# Patient Record
Sex: FEMALE | Race: WHITE | NOT HISPANIC OR LATINO | Employment: PART TIME | URBAN - METROPOLITAN AREA
[De-identification: names, ages, dates, MRNs, and addresses within clinical notes are randomized per-mention and may not be internally consistent; named-entity substitution may affect disease eponyms.]

---

## 2017-01-02 ENCOUNTER — HOSPITAL ENCOUNTER (EMERGENCY)
Facility: HOSPITAL | Age: 32
Discharge: HOME | End: 2017-01-02
Attending: EMERGENCY MEDICINE | Admitting: EMERGENCY MEDICINE
Payer: COMMERCIAL

## 2017-01-02 VITALS
TEMPERATURE: 99.4 F | BODY MASS INDEX: 23.55 KG/M2 | HEIGHT: 62 IN | HEART RATE: 84 BPM | OXYGEN SATURATION: 97 % | RESPIRATION RATE: 20 BRPM | WEIGHT: 128 LBS | SYSTOLIC BLOOD PRESSURE: 133 MMHG | DIASTOLIC BLOOD PRESSURE: 78 MMHG

## 2017-01-02 DIAGNOSIS — J34.89 FRONTAL SINUS PAIN: ICD-10-CM

## 2017-01-02 DIAGNOSIS — J06.9 UPPER RESPIRATORY INFECTION: Primary | ICD-10-CM

## 2017-01-02 LAB
FLUAV AG SPEC QL IA: NEGATIVE
FLUBV AG SPEC QL IA: NEGATIVE

## 2017-01-02 PROCEDURE — 99283 EMERGENCY DEPT VISIT LOW MDM: CPT

## 2017-01-02 PROCEDURE — 87798 DETECT AGENT NOS DNA AMP: CPT | Performed by: EMERGENCY MEDICINE

## 2017-01-02 PROCEDURE — 87400 INFLUENZA A/B EACH AG IA: CPT

## 2017-01-02 RX ORDER — NAPROXEN 500 MG/1
500 TABLET ORAL 2 TIMES DAILY WITH MEALS
Qty: 10 TABLET | Refills: 0 | Status: SHIPPED | OUTPATIENT
Start: 2017-01-02 | End: 2018-06-05 | Stop reason: ALTCHOICE

## 2017-01-02 RX ORDER — PSEUDOEPHEDRINE HYDROCHLORIDE 30 MG/1
30 TABLET ORAL EVERY 4 HOURS PRN
COMMUNITY
End: 2018-06-05 | Stop reason: ALTCHOICE

## 2017-01-02 RX ORDER — AMOXICILLIN AND CLAVULANATE POTASSIUM 875; 125 MG/1; MG/1
1 TABLET, FILM COATED ORAL 2 TIMES DAILY
Qty: 10 TABLET | Refills: 0 | Status: SHIPPED | OUTPATIENT
Start: 2017-01-02 | End: 2017-01-07

## 2017-01-03 LAB
FLUAV AG SPEC QL: NORMAL
FLUBV AG SPEC QL: NORMAL
RSV B RNA SPEC QL NAA+PROBE: NORMAL

## 2017-02-08 ENCOUNTER — GENERIC CONVERSION - ENCOUNTER (OUTPATIENT)
Dept: OTHER | Facility: OTHER | Age: 32
End: 2017-02-08

## 2017-07-03 ENCOUNTER — HOSPITAL ENCOUNTER (OUTPATIENT)
Facility: HOSPITAL | Age: 32
Setting detail: OUTPATIENT SURGERY
Discharge: HOME | End: 2017-07-03
Attending: OBSTETRICS & GYNECOLOGY | Admitting: OBSTETRICS & GYNECOLOGY
Payer: COMMERCIAL

## 2017-07-03 ENCOUNTER — ANESTHESIA (OUTPATIENT)
Dept: PERIOP | Facility: HOSPITAL | Age: 32
End: 2017-07-03
Payer: COMMERCIAL

## 2017-07-03 ENCOUNTER — ANESTHESIA EVENT (OUTPATIENT)
Dept: PERIOP | Facility: HOSPITAL | Age: 32
End: 2017-07-03
Payer: COMMERCIAL

## 2017-07-03 VITALS
WEIGHT: 120 LBS | HEART RATE: 54 BPM | OXYGEN SATURATION: 99 % | TEMPERATURE: 98.7 F | SYSTOLIC BLOOD PRESSURE: 106 MMHG | DIASTOLIC BLOOD PRESSURE: 60 MMHG | RESPIRATION RATE: 18 BRPM | HEIGHT: 62 IN | BODY MASS INDEX: 22.08 KG/M2

## 2017-07-03 DIAGNOSIS — Z30.2 STERILIZATION: ICD-10-CM

## 2017-07-03 LAB — EXT PREGNANCY TEST URINE: NEGATIVE

## 2017-07-03 PROCEDURE — 81025 URINE PREGNANCY TEST: CPT | Performed by: OBSTETRICS & GYNECOLOGY

## 2017-07-03 PROCEDURE — 88302 TISSUE EXAM BY PATHOLOGIST: CPT | Performed by: OBSTETRICS & GYNECOLOGY

## 2017-07-03 PROCEDURE — C1758 CATHETER, URETERAL: HCPCS | Performed by: OBSTETRICS & GYNECOLOGY

## 2017-07-03 RX ORDER — PHENAZOPYRIDINE HYDROCHLORIDE 100 MG/1
100 TABLET, FILM COATED ORAL
Status: DISCONTINUED | OUTPATIENT
Start: 2017-07-03 | End: 2017-07-03 | Stop reason: HOSPADM

## 2017-07-03 RX ORDER — MAGNESIUM HYDROXIDE 1200 MG/15ML
LIQUID ORAL AS NEEDED
Status: DISCONTINUED | OUTPATIENT
Start: 2017-07-03 | End: 2017-07-03 | Stop reason: HOSPADM

## 2017-07-03 RX ORDER — MIDAZOLAM HYDROCHLORIDE 1 MG/ML
INJECTION INTRAMUSCULAR; INTRAVENOUS AS NEEDED
Status: DISCONTINUED | OUTPATIENT
Start: 2017-07-03 | End: 2017-07-03 | Stop reason: SURG

## 2017-07-03 RX ORDER — FENTANYL CITRATE 50 UG/ML
INJECTION, SOLUTION INTRAMUSCULAR; INTRAVENOUS AS NEEDED
Status: DISCONTINUED | OUTPATIENT
Start: 2017-07-03 | End: 2017-07-03 | Stop reason: SURG

## 2017-07-03 RX ORDER — SODIUM CHLORIDE, SODIUM LACTATE, POTASSIUM CHLORIDE, CALCIUM CHLORIDE 600; 310; 30; 20 MG/100ML; MG/100ML; MG/100ML; MG/100ML
INJECTION, SOLUTION INTRAVENOUS CONTINUOUS PRN
Status: DISCONTINUED | OUTPATIENT
Start: 2017-07-03 | End: 2017-07-03 | Stop reason: SURG

## 2017-07-03 RX ORDER — FENTANYL CITRATE/PF 50 MCG/ML
25 SYRINGE (ML) INJECTION
Status: DISCONTINUED | OUTPATIENT
Start: 2017-07-03 | End: 2017-07-03 | Stop reason: HOSPADM

## 2017-07-03 RX ORDER — ONDANSETRON 2 MG/ML
INJECTION INTRAMUSCULAR; INTRAVENOUS AS NEEDED
Status: DISCONTINUED | OUTPATIENT
Start: 2017-07-03 | End: 2017-07-03 | Stop reason: SURG

## 2017-07-03 RX ORDER — BUPIVACAINE HYDROCHLORIDE AND EPINEPHRINE 2.5; 5 MG/ML; UG/ML
INJECTION, SOLUTION EPIDURAL; INFILTRATION; INTRACAUDAL; PERINEURAL AS NEEDED
Status: DISCONTINUED | OUTPATIENT
Start: 2017-07-03 | End: 2017-07-03 | Stop reason: HOSPADM

## 2017-07-03 RX ORDER — FUROSEMIDE 10 MG/ML
INJECTION INTRAMUSCULAR; INTRAVENOUS AS NEEDED
Status: DISCONTINUED | OUTPATIENT
Start: 2017-07-03 | End: 2017-07-03 | Stop reason: SURG

## 2017-07-03 RX ORDER — GLYCOPYRROLATE 0.2 MG/ML
INJECTION INTRAMUSCULAR; INTRAVENOUS AS NEEDED
Status: DISCONTINUED | OUTPATIENT
Start: 2017-07-03 | End: 2017-07-03 | Stop reason: SURG

## 2017-07-03 RX ORDER — DEXAMETHASONE SODIUM PHOSPHATE 4 MG/ML
INJECTION, SOLUTION INTRA-ARTICULAR; INTRALESIONAL; INTRAMUSCULAR; INTRAVENOUS; SOFT TISSUE AS NEEDED
Status: DISCONTINUED | OUTPATIENT
Start: 2017-07-03 | End: 2017-07-03 | Stop reason: SURG

## 2017-07-03 RX ORDER — ROCURONIUM BROMIDE 10 MG/ML
INJECTION, SOLUTION INTRAVENOUS AS NEEDED
Status: DISCONTINUED | OUTPATIENT
Start: 2017-07-03 | End: 2017-07-03 | Stop reason: SURG

## 2017-07-03 RX ORDER — METOCLOPRAMIDE HYDROCHLORIDE 5 MG/ML
10 INJECTION INTRAMUSCULAR; INTRAVENOUS ONCE AS NEEDED
Status: COMPLETED | OUTPATIENT
Start: 2017-07-03 | End: 2017-07-03

## 2017-07-03 RX ORDER — SODIUM CHLORIDE, SODIUM LACTATE, POTASSIUM CHLORIDE, CALCIUM CHLORIDE 600; 310; 30; 20 MG/100ML; MG/100ML; MG/100ML; MG/100ML
75 INJECTION, SOLUTION INTRAVENOUS CONTINUOUS
Status: DISCONTINUED | OUTPATIENT
Start: 2017-07-03 | End: 2017-07-03 | Stop reason: HOSPADM

## 2017-07-03 RX ORDER — PROPOFOL 10 MG/ML
INJECTION, EMULSION INTRAVENOUS AS NEEDED
Status: DISCONTINUED | OUTPATIENT
Start: 2017-07-03 | End: 2017-07-03 | Stop reason: SURG

## 2017-07-03 RX ADMIN — METHYLENE BLUE 50 MG: 5 INJECTION INTRAVENOUS at 10:11

## 2017-07-03 RX ADMIN — ROCURONIUM BROMIDE 40 MG: 10 INJECTION, SOLUTION INTRAVENOUS at 09:30

## 2017-07-03 RX ADMIN — DEXAMETHASONE SODIUM PHOSPHATE 8 MG: 4 INJECTION, SOLUTION INTRA-ARTICULAR; INTRALESIONAL; INTRAMUSCULAR; INTRAVENOUS; SOFT TISSUE at 09:45

## 2017-07-03 RX ADMIN — GLYCOPYRROLATE 0.6 MG: 0.2 INJECTION, SOLUTION INTRAMUSCULAR; INTRAVENOUS at 10:04

## 2017-07-03 RX ADMIN — PROPOFOL 200 MG: 10 INJECTION, EMULSION INTRAVENOUS at 09:30

## 2017-07-03 RX ADMIN — FENTANYL CITRATE 50 MCG: 50 INJECTION, SOLUTION INTRAMUSCULAR; INTRAVENOUS at 09:30

## 2017-07-03 RX ADMIN — NEOSTIGMINE METHYLSULFATE 4 MG: 1 INJECTION, SOLUTION INTRAMUSCULAR; INTRAVENOUS; SUBCUTANEOUS at 10:04

## 2017-07-03 RX ADMIN — ONDANSETRON 4 MG: 2 INJECTION INTRAMUSCULAR; INTRAVENOUS at 10:04

## 2017-07-03 RX ADMIN — LIDOCAINE HYDROCHLORIDE 60 MG: 20 INJECTION, SOLUTION INTRAVENOUS at 09:30

## 2017-07-03 RX ADMIN — FENTANYL CITRATE 50 MCG: 50 INJECTION, SOLUTION INTRAMUSCULAR; INTRAVENOUS at 09:40

## 2017-07-03 RX ADMIN — METOCLOPRAMIDE 10 MG: 5 INJECTION, SOLUTION INTRAMUSCULAR; INTRAVENOUS at 11:28

## 2017-07-03 RX ADMIN — PROPOFOL 50 MG: 10 INJECTION, EMULSION INTRAVENOUS at 09:40

## 2017-07-03 RX ADMIN — FUROSEMIDE 10 MG: 10 INJECTION, SOLUTION INTRAMUSCULAR; INTRAVENOUS at 10:10

## 2017-07-03 RX ADMIN — SODIUM CHLORIDE, SODIUM LACTATE, POTASSIUM CHLORIDE, AND CALCIUM CHLORIDE: .6; .31; .03; .02 INJECTION, SOLUTION INTRAVENOUS at 09:00

## 2017-07-03 RX ADMIN — MIDAZOLAM HYDROCHLORIDE 2 MG: 1 INJECTION, SOLUTION INTRAMUSCULAR; INTRAVENOUS at 09:27

## 2017-07-06 PROBLEM — Z30.2 ENCOUNTER FOR STERILIZATION: Status: ACTIVE | Noted: 2017-07-06

## 2017-10-24 ENCOUNTER — GENERIC CONVERSION - ENCOUNTER (OUTPATIENT)
Dept: OTHER | Facility: OTHER | Age: 32
End: 2017-10-24

## 2017-10-24 ENCOUNTER — ALLSCRIPTS OFFICE VISIT (OUTPATIENT)
Dept: OTHER | Facility: OTHER | Age: 32
End: 2017-10-24

## 2017-10-24 LAB
BILIRUB UR QL STRIP: NORMAL
CLARITY UR: NORMAL
COLOR UR: YELLOW
GLUCOSE (HISTORICAL): NORMAL
HGB UR QL STRIP.AUTO: 50
KETONES UR STRIP-MCNC: NORMAL MG/DL
LEUKOCYTE ESTERASE UR QL STRIP: 75
NITRITE UR QL STRIP: NORMAL
PH UR STRIP.AUTO: 6 [PH]
PROT UR STRIP-MCNC: NORMAL MG/DL
SP GR UR STRIP.AUTO: 1.02
UROBILINOGEN UR QL STRIP.AUTO: NORMAL

## 2017-10-27 LAB
BACTERIA UR QL AUTO: ABNORMAL
BILIRUB UR QL STRIP: NEGATIVE
COLOR UR: YELLOW
COMMENT (HISTORICAL): ABNORMAL
CULTURE RESULT (HISTORICAL): ABNORMAL
FECAL OCCULT BLOOD DIAGNOSTIC (HISTORICAL): NEGATIVE
GLUCOSE (HISTORICAL): NEGATIVE
KETONES UR STRIP-MCNC: ABNORMAL MG/DL
LEUKOCYTE ESTERASE UR QL STRIP: ABNORMAL
MICROSCOPIC EXAMINATION (HISTORICAL): ABNORMAL
MISCELLANEOUS LAB TEST RESULT (HISTORICAL): ABNORMAL
MUCUS THREADS (HISTORICAL): PRESENT
NITRITE UR QL STRIP: NEGATIVE
NON-SQ EPI CELLS URNS QL MICRO: ABNORMAL /HPF
PH UR STRIP.AUTO: 6 [PH] (ref 5–7.5)
PROT UR STRIP-MCNC: NEGATIVE MG/DL
RBC (HISTORICAL): ABNORMAL /HPF
SP GR UR STRIP.AUTO: 1.02 (ref 1–1.03)
SUSCEP. REFLEX (HISTORICAL): ABNORMAL
URINALYSIS (UA) (HISTORICAL): ABNORMAL
UROBILINOGEN UR QL STRIP.AUTO: 1 EU/DL (ref 0.2–1)
WBC # BLD AUTO: ABNORMAL /HPF

## 2017-12-21 ENCOUNTER — ALLSCRIPTS OFFICE VISIT (OUTPATIENT)
Dept: OTHER | Facility: OTHER | Age: 32
End: 2017-12-21

## 2018-01-10 NOTE — RESULT NOTES
Message  Called and spoke with patient regarding her recent colposcopy results  I discussed with patient that her Pap and HPV was negative during the colposcopy procedure  I advised patient that her pathology report came back as MILADYS-1  I advised patient that she will need a repeat Pap with code testing of HPV in 1 year  I discussed with patient regarding MILADYS-1 and what it means  I answered all patient's questions  I advised patient to come speak to a physician in the clinic if she has further questions  Currently patient has no  symptoms-denies vaginal discharge, bleeding or pain  Results/Data     [facility]) Pathology Report    : Comment    : Comment    : Comment    : Comment    : Comment    : Comment    : Comment    : Comment     Signatures   Electronically signed by :  LALITA Underwood ; Dec 15 2016  5:55PM EST                       (Author)

## 2018-01-11 NOTE — PROGRESS NOTES
MAY 26 2016         RE: Rehan Geren                              To: [facility]   MR#: [id number]                                    [address]   : [address], [address] 25: [id number]                             Fax: [phone]   (Exam #: [id number])      The LMP of this 31year old,  G3, P2-0-0-2 patient was DEC 25 2015, giving   her an FLAQUITO of SEP 30 2016 and a current gestational age of 22 weeks 6 days   by dates  A sonographic examination was performed on MAY 26 2016 using   real time equipment  The ultrasound examination was performed using   abdominal technique  The patient has a BMI of 23 2  Her blood pressure   today was 123/69        Earliest ultrasound found in her record: 3/2/16   9w4d  10/1/16 FLAQUITO      Cardiac motion was observed at 138 bpm       INDICATIONS      fetal anatomical survey      Exam Types      Transvaginal   LEVEL II      RESULTS      Fetus # 1 of 1   Vertex presentation   Fetal growth appeared normal   Placenta Location = Anterior   No placenta previa   Placenta Grade = II      MEASUREMENTS (* Included In Average GA)      AC              15 7 cm        20 weeks 4 days* (24%)   BPD              5 2 cm        21 weeks 5 days* (44%)   HC              19 1 cm        21 weeks 1 day * (30%)   Femur            3 5 cm        21 weeks 2 days* (31%)      Nuchal Fold      3 4 mm      Humerus          3 5 cm        21 weeks 6 days  (47%)   Radius           2 7 cm        21 weeks 1 day   Ulna             3 2 cm        22 weeks 0 days   Tibia            3 3 cm        22 weeks 0 days  (47%)   Fibula           3 1 cm        20 weeks 5 days   Foot             3 3 cm        20 weeks 2 days      Cerebellum       2 4 cm        22 weeks 6 days   Biorbit          3 5 cm        22 weeks 1 day   CisternaMagna    5 8 mm      HC/AC           1 21   FL/AC           0 22   FL/BPD          0 68   EFW (Ac/Fl/Hc)   393 grams - 0 lbs 14 oz      THE AVERAGE GESTATIONAL AGE is 21 weeks 1 day +/- 10 days  AMNIOTIC FLUID         Largest Vertical Pocket = 4 6 cm   Amniotic Fluid: Normal      CERVICAL EVALUATION      SUPINE      Cervical Length: 3 70 cm      OTHER TEST RESULTS           Funneling?: No             Dynamic Changes?: No        Resp  To TFP?: No      ANATOMY DETAILS      Visualized Appearing Sonographically Normal:   HEAD: (Calvarium, BPD Level, Lateral Ventricles, Choroid Plexus,   Cerebellum, Cisterna Magna);    FACE/NECK: (Neck, Nuchal Fold, Profile,   Orbits, Nose/Lips, Palate, Face);    TH  CAV : (Diaphragm); HEART: (3   Vessel Trachea, Four Chamber View, Proximal Left Outflow, Proximal Right   Outflow, Aortic Arch, Ductal Arch, Short Axis of Greater Vessels, Cardiac   Axis, Interventricular Septum, Interatrial Septum, Cardiac Position);      ABD  CAV , STOMACH, RIGHT KIDNEY, LEFT KIDNEY, BLADDER, ABD  WALL, SPINE:   (Cervical Spine, Thoracic Spine, Lumbar Spine, Sacrum);    EXTREMS: (Lt   Humerus, Rt Humerus, Lt Forearm, Rt Forearm, Lt Hand, Rt Hand, Lt Femur,   Rt Femur, Lt Low Leg, Rt Low Leg, Lt Foot, Rt Foot);    GENITALIA   (Female), PLACENTA, UMBL  CORD, UTERUS, PCI      ADNEXA      The left ovary appeared normal and measured 2 6 x 3 1 x 1 2 cm with a   volume of 5 1 cc  The right ovary appeared normal and measured 2 7 x 1 8 x   2 0 cm with a volume of 5 1 cc  IMPRESSION      Vaca IUP   21 weeks and 1 day by this ultrasound  (FLAQUITO=OCT 5 2016)   Vertex presentation   Fetal growth appeared normal   Normal anatomy survey   Regular fetal heart rate of 138 bpm   Anterior placenta   No placenta previa      GENERAL COMMENT      On exam today the patient appears well, in no acute distress, and denies   any complaints  Her abdomen is non-tender  The patient had Sequential Screening at our Center     Her risk for trisomy   21 before screening was 1:820, after screening her risk is 1:80326; her   risk for Trisomy 18 before screening was 1:97028, after screening her risk   is 1:0000  The open neural tube defect risk after screening is 1:5600  The fetal anatomic survey is complete  There is no sonographic evidence   of fetal abnormalities at this time  Good fetal movement and tone are   seen  The amniotic fluid volume appears normal   The placenta is anterior   and it appears sonographically normal   A transvaginal ultrasound was   performed to assess the cervix, which was not seen well transabdominally  The cervical length was 3 7 centimeters, which is normal for the current   gestational age  There was no significant funneling or dynamic changes   appreciated  The patient was informed of today's findings and all of her   questions were answered  The limitations of ultrasound were reviewed with   the patient, which she accepts  Recommend further scans as clinically indicated  Precautions were   reviewed  Thank you very much for allowing us to participate in the care of this   very nice patient  Should you have any questions, please do not hesitate   to contact our office  NILDA Stewart M D     Electronically signed 05/26/16 18:00

## 2018-01-12 NOTE — MISCELLANEOUS
Message  Left VM that pt does not have UTI   Will need UA at 6 wk postpartum visit to follow up hematuria      Signatures   Electronically signed by : Thuy Savage DO; Oct 13 2016 11:36AM EST                       (Author)

## 2018-01-14 VITALS
SYSTOLIC BLOOD PRESSURE: 104 MMHG | RESPIRATION RATE: 18 BRPM | OXYGEN SATURATION: 98 % | HEIGHT: 62 IN | WEIGHT: 119 LBS | HEART RATE: 81 BPM | DIASTOLIC BLOOD PRESSURE: 68 MMHG | TEMPERATURE: 97.1 F | BODY MASS INDEX: 21.9 KG/M2

## 2018-01-14 NOTE — MISCELLANEOUS
Provider Comments  Provider Comments:   NO SHOW CALLED PHONE MUMBER LISTED AND IT WAS D/      Signatures   Electronically signed by : Vika Mcintosh MD; Feb 9 2017 10:25AM EST                       (Author)

## 2018-01-14 NOTE — MISCELLANEOUS
Message  Message Free Text Note Form: [facility] regarding latest pap smear results which were cytology negative but were high risk HPV +  In February 2015, also had similar results  She was informed about the results and the implications and also recommended to be retested in 1 year  She verbalized understanding        Signatures   Electronically signed by : Mary Mane DO; Mar 15 2016  6:07PM EST                       (Author)    Electronically signed by : LALITA Castro ; Mar 23 2016  1:10PM EST

## 2018-01-14 NOTE — MISCELLANEOUS
Message  Called pt to let her know that final U Cx grew small collony of e coli, so will tx with Keflex  Plan  UTI (urinary tract infection)    · Cephalexin 500 MG Oral Capsule (Keflex); TAKE 1 CAPSULE 4 times daily for 28  days    Start July 22,2016    Signatures   Electronically signed by : Vadim Pack DO; Oct 13 2016  5:55PM EST                       (Author)

## 2018-01-14 NOTE — PROGRESS NOTES
Active Problems    1  Fetal disproportion (653 50) (O33 5XX0)   2  H/O intrauterine growth restriction in prior pregnancy, currently pregnant, third trimester   (V23 49) (O09 293)    Allergies    1  No Known Drug Allergies    2  No Known Latex Allergies   3  Seasonal    Vitals  Signs   Recorded: 91NQI7750 44:05YT   Systolic: 791, LUE, Sitting  Diastolic: 70, LUE, Sitting  Pain Scale: 0  Height: 5 ft 2 in  Weight: 141 lb 12 8 oz  BMI Calculated: 25 94  BSA Calculated: 1 65    Procedure    G/P 3/2   [facility] 09/30/2016   /7   /70   Indication: IUGR  Duration of Test 20 minutes  Result: Reactive and > 15 bpm with movement  Baseline Rate 135 bpm    Deceleration: None  Uterine Activity: Mild, Irregular  Comment: instructed to notify ob if increase in frequency or intensity of contractions   Required # Stimuli Response: No    Comment: scheduled for induction 9/19 per pt   Fetal kick counts were reviewed with the patient  Recommend NST: twice weekly  Recommend MANUEL: weekly  Current Meds   1  Fluticasone Propionate 50 MCG/ACT Nasal Suspension; use 2 sprays in each nostril   once daily; Therapy: 99MJQ2193 to (Last Rx:12Nov2015)  Requested for: 68ECZ5564 Ordered   2  Multivitamin TABS; TAKE 1 TABLET DAILY;    Therapy: (Maye Boston) to Recorded    Signatures   Electronically signed by : Nando Gaines, ; Sep 15 2016 11:32AM EST                       (Author)    Electronically signed by : JEAN Edwards MD; Sep 15 2016 10:02PM EST                       (Author)

## 2018-01-16 NOTE — PROGRESS NOTES
Chief Complaint  depo shot      Active Problems    1  Contraception (V25 9) (Z30 9)   2  Fetal disproportion (653 50) (O33 5XX0)   3  Foul smelling urine (791 9) (R82 90)   4  H/O intrauterine growth restriction in prior pregnancy, currently pregnant, third trimester   (V23 49) (O09 293)   5  Need for Tdap vaccination (V06 1) (Z23)   6  Postpartum exam (V24 2) (Z39 2)   7  UTI (urinary tract infection) (599 0) (N39 0)    Current Meds   1  Fluticasone Propionate 50 MCG/ACT Nasal Suspension; use 2 sprays in each nostril   once daily; Therapy: 66VTU1072 to (Last Rx:64Vhy5763)  Requested for: 11ZHY1796 Ordered   2  MedroxyPROGESTERone Acetate 150 MG/ML Intramuscular Suspension; INJECT 150    MG Intramuscular every 11 weeks; Therapy: 41LFJ3875 to (Last Rx:19Rkq6808)  Requested for: 78BCT8018 Ordered    Allergies    1  No Known Drug Allergies    2  No Known Latex Allergies   3  Seasonal    Plan  Contraception    · MedroxyPROGESTERone Acetate 150 MG/ML Intramuscular Suspension  Need for influenza vaccination    · Fluzone Quadrivalent 0 5 ML Intramuscular Suspension    Future Appointments    Date/Time Provider Specialty Site   11/29/2016 03:00 PM Leah Juarez M D   Family Medicine [facility]     Signatures   Electronically signed by : LALITA Grider ; Nov 28 2016  1:07PM EST                       (Co-author)

## 2018-01-16 NOTE — PROGRESS NOTES
SEP 12 2016         RE: Katie Maharaj                              To: [facility]   MR#: [id number]                                    [address]   : [address] [facility], [address]  [address] [place] [address] 25: [phone]                             Fax: [phone]   (Exam #: [id number])      The LMP of this 36year old,  G3, P2-0-0-2 patient was DEC 25 2015, giving   her an FLAQUITO of SEP 30 2016 and a current gestational age of 42 weeks 3 days   by dates  A sonographic examination was performed on SEP 12 2016 using   real time equipment  The ultrasound examination was performed using   abdominal technique  The patient has a BMI of 25 8  Her blood pressure   today was 111/66  Earliest ultrasound found in her record: 3/2/16   9w4d  10/1/16 FLAQUITO      Cardiac motion was observed at 159 bpm       INDICATIONS      intrauterine growth restriction      Exam Types      Amniotic Fluid Index   NST   Doppler study      RESULTS      Fetus # 1 of 1   Vertex presentation   Placenta Location = Anterior   No placenta previa   Placenta Grade = II      The NST was reactive with no decelerations  AMNIOTIC FLUID      Q1: 3 7      Q2: 4 2      Q3: 4 0      Q4: 2 2   MANUEL Total = 14 1 cm   Amniotic Fluid: Normal      FETAL VESSELS                                     S/D   PI    RI    PSV   AEDV RF                                                    cm/s       Umbilical Artery                 0 90              No   No       Middle Cerebral Artery           1 47      BIOPHYSICAL PROFILE      The Biophysical Profile score was 10/10  Breathin  Movement: 2  Tone: 2  AFV: 2  NST: 2   The NST was reactive with no decelerations        IMPRESSION      Vaca IUP   Vertex presentation   Regular fetal heart rate of 159 bpm   Anterior placenta   No placenta previa      GENERAL COMMENT      The amniotic fluid volume and fetal Doppler studies are normal  The   cerebroplacental ratio is normal  Suggested follow-up fetal surveillance   includes continuation of twice per week NST's, weekly MANUEL's, and daily   kick counts  Fetal Doppler studies and assessment of interval growth will   be performed next week  NILDA Pierson M D     Maternal-Fetal Medicine   Electronically signed 09/12/16 12:15

## 2018-01-17 NOTE — MISCELLANEOUS
Message  Called [facility]ague at [phone] [facility] regarding pt's depression/anxiety issues  Left message with  who said she will have [facility] return call  Also Tony Catalan who said they only have a post-partum depression program  I called the # they provided [phone], and spoke to  who stated they can get pt  an appt at [facility] within 7-10 business days through a [employer]  They asked that pt  contact them directly so I called the pt  and provivded the ph#  Pt stated she would call1        1 Amended By: Karine Marshall; Jul 28 2016 11:47 AM EST    Active Problems   1  1St trimester screening (V28 89) (Z36)  2  Cervical cancer screening (V76 2) (Z12 4)  3  Need for Tdap vaccination (V06 1) (Z23)  4  Prenatal care in second trimester (V22 1) (Z34 92)    Current Meds  1  Fluticasone Propionate 50 MCG/ACT Nasal Suspension; use 2 sprays in each nostril   once daily; Therapy: 77KHR4364 to (Last Rx:12Nov2015)  Requested for: 63HMR1869 Ordered  2  Multivitamin TABS; TAKE 1 TABLET DAILY; Therapy: (Recorded:23Mar2016) to Recorded    Allergies   1  No Known Drug Allergies   2  No Known Latex Allergies  3   Seasonal    Signatures   Electronically signed by : Celine Addison RN; Jul 28 2016 11:47AM EST                       (Author)

## 2018-01-18 NOTE — MISCELLANEOUS
To Whom It May Concern:    Patient Constance Dockery was seen in the office today  Currently she is cleared to return to work  Thank you,    Sincerely,    Abdulaziz Juarez MD      Electronically signed by: Algie Paget M D    Nov 30 2016  6:55PM EST Author

## 2018-01-18 NOTE — PROGRESS NOTES
Chief Complaint  flu vaccine administered as per protocol      Active Problems    1  Contraception (V25 9) (Z30 9)   2  Fetal disproportion (653 50) (O33 5XX0)   3  Foul smelling urine (791 9) (R82 90)   4  H/O intrauterine growth restriction in prior pregnancy, currently pregnant, third trimester   (V23 49) (O09 293)   5  Need for Tdap vaccination (V06 1) (Z23)   6  Postpartum exam (V24 2) (Z39 2)   7  UTI (urinary tract infection) (599 0) (N39 0)    Current Meds   1  Fluticasone Propionate 50 MCG/ACT Nasal Suspension; use 2 sprays in each nostril   once daily; Therapy: 75EZE7520 to (Last Rx:76Ppx5641)  Requested for: 37BLJ9755 Ordered   2  MedroxyPROGESTERone Acetate 150 MG/ML Intramuscular Suspension; INJECT 150    MG Intramuscular every 11 weeks; Therapy: 73RPN5851 to (Last Rx:29Qrj8091)  Requested for: 23UVE6215 Ordered    Allergies    1  No Known Drug Allergies    2  No Known Latex Allergies   3  Seasonal    Future Appointments    Date/Time Provider Specialty Site   11/29/2016 03:00 PM Gautam Juarez M D   Family Medicine [facility]     Signatures   Electronically signed by : LALITA Dumont ; Nov 28 2016  1:16PM EST                       (Co-participant)

## 2018-01-23 NOTE — PROGRESS NOTES
Chief Complaint  flu vacc given      Active Problems    1  Contraception (V25 9) (Z30 9)   2  Dysuria (788 1) (R30 0)   3  Fetal disproportion (653 50) (O33 5XX0)   4  H/O intrauterine growth restriction in prior pregnancy, currently pregnant, third trimester   (V23 49) (O09 293)   5  UTI (urinary tract infection) (599 0) (N39 0)   6  Vaginal high risk HPV DNA test positive (795 15) (R87 811)    Current Meds   1  Fluticasone Propionate 50 MCG/ACT Nasal Suspension; use 2 sprays in each nostril   once daily; Therapy: 09LAD0526 to (Last Rx:12Nov2015)  Requested for: 39DMT4339 Ordered   2  Sulfamethoxazole-Trimethoprim 800-160 MG Oral Tablet; TAKE 1 TABLET TWICE DAILY   UNTIL FINISHED; Therapy: 54MMY2810 to (16 945 090)  Requested for: [phone] 1004-775; Last   Rx:24Oct2017 Ordered    Allergies    1  No Known Drug Allergies    2  No Known Latex Allergies   3  Seasonal    Plan  Flu vaccine need    · Fluzone Quadrivalent 0 5 ML Intramuscular Suspension Prefilled Syringe    Signatures   Electronically signed by :  LALITA Stinson ; Dec 21 2017  4:24PM EST                       (Acknowledgement)

## 2018-01-23 NOTE — PROCEDURES
Chief Complaint  colposcopy positive HPV      Current Meds   1  Fluticasone Propionate 50 MCG/ACT Nasal Suspension; use 2 sprays in each nostril   once daily; Therapy: 90TTT5170 to (Last Rx:12Nov2015)  Requested for: 43RAK0669 Ordered   2  MedroxyPROGESTERone Acetate 150 MG/ML Intramuscular Suspension; INJECT 150    MG Intramuscular every 11 weeks; Therapy: 52MJH4046 to (Last Rx:15Nov2016)  Requested for: 79OJC7771 Ordered    Allergies    1  No Known Drug Allergies    2  No Known Latex Allergies   3  Seasonal    Vitals  Signs    Temperature: 97 3 F  Heart Rate: 68  Respiration: 18  Systolic: 926  Diastolic: 74  Height: 5 ft 2 in  Weight: 127 lb   BMI Calculated: 23 23  BSA Calculated: 1 58  O2 Saturation: 98  Pain Scale: 0    Procedure    Procedure: colposcopy  Indication: PCR positive for high risk HPV  Risks, benefits and alternatives were discussed with the patient  We discussed possible complications, including infection, bleeding and allergic reaction  Written consent was obtained prior to the procedure  Procedure Note:   A cervical Pap smear was not performed  The squamocolumnar junction was fully visualized  After bathing the cervix in acetic acid, evaluation showed acetowhite changes at  Vaginal Vault: no abnormalities seen  Cervical Biopsy: the biopsies were taken at 9 o'clock  Endocervical curettage was performed  Hemostasis was obtained with Monsel's solution  Patient Status: the patient tolerated the procedure well  Complications: there were no complications  Assessment    1  Vaginal high risk HPV DNA test positive (798 15) (Y47 929)    Plan  Amenorrhea    · Urine HCG- POC; Status:Complete;   Done: 17TUX4312 03:23PM  Vaginal high risk HPV DNA test positive    · () [address] Surgical Pathology; Status:Active;  Requested for:29Nov2016;     Discussion/Summary    70-year-old female here for colposcopy secondary to history of multiple positive high risk HPV  - Colposcopy was done today by Dr Zain Luciano   - Patient tolerated procedure well with no complications  - She was told to return in 2 weeks for results and discussion   - Patient also requested a letter so she can start work again    Discussed with Dr Zain Luciano  Future Appointments    Date/Time Provider Specialty Site   12/19/2016 03:45 PM Peg Juarez M D  Family Medicine [facility]     Attending Note  Attending Note: I discussed the case with the Resident and reviewed the Resident's note, I did not supervise the Resident, I did not supervise the procedure performed by the Resident and I agree with the Resident management plan as it was presented to me  Level of Participation: I was present in clinic, but did not examine the patient  Diagnosis and Plan: Supervised by Dr Zain Luciano  I agree with the Resident's note  Signatures   Electronically signed by :  LALITA Mendiola ; Nov 30 2016  6:56PM EST                       (Author)    Electronically signed by : LALITA Faith ; Dec  1 2016  2:28PM EST                       (Author)

## 2018-06-05 ENCOUNTER — OFFICE VISIT (OUTPATIENT)
Dept: FAMILY MEDICINE CLINIC | Facility: CLINIC | Age: 33
End: 2018-06-05
Payer: COMMERCIAL

## 2018-06-05 VITALS
HEIGHT: 62 IN | RESPIRATION RATE: 16 BRPM | WEIGHT: 119 LBS | OXYGEN SATURATION: 99 % | HEART RATE: 63 BPM | BODY MASS INDEX: 21.9 KG/M2 | DIASTOLIC BLOOD PRESSURE: 60 MMHG | SYSTOLIC BLOOD PRESSURE: 106 MMHG

## 2018-06-05 DIAGNOSIS — F41.9 ANXIETY: Primary | ICD-10-CM

## 2018-06-05 PROCEDURE — 3008F BODY MASS INDEX DOCD: CPT | Performed by: FAMILY MEDICINE

## 2018-06-05 PROCEDURE — 99213 OFFICE O/P EST LOW 20 MIN: CPT | Performed by: FAMILY MEDICINE

## 2018-06-05 PROCEDURE — 1036F TOBACCO NON-USER: CPT | Performed by: FAMILY MEDICINE

## 2018-06-05 NOTE — PROGRESS NOTES
Assessment/Plan:    Anxiety  Requisition for TSH with reflex T4 given to patient  Requisition for referral to psychiatry ([facility]) also given to patient  Will defer starting any pharmacotherapy at this time due to patient's prior successful history of treatment with behavioral therapy  Advised if symptoms worsen to RTC while awaiting psychiatric appointment  D/W Dr Qiu Heading:      Patient ID: Joan Charles is a 29 y o  female  HPI  This is a 37-year-old female with a prior history of anxiety who presents for progressively worsening anxiety over the past 2 weeks  She states she has been under a lot stress over the past 2 weeks as result of which her anxiety has increased  Patient prefers not to describe the events in her personal life at this time causing her stresses  She gives a history of anxiety over the past few years for which she had been following with family guidance  She also admits that she had 2 episodes of panic attacks over the past 2 years however did not require any pharmacotherapy for this or her history of anxiety  She prefers to follow-up with another psychiatrist instead of returning to family guidance as she states that time to schedule an appointment is extremely long and she prefers to be seen at earliest   She denies any chest pain, shortness of breath, nausea, vomiting, palpitations, diaphoresis or syncope  [place] does admit to having a family history of hyperthyroidism in her mother  However she herself denies any diarrhea, heat intolerance or other symptoms of hyperthyroidism except for her anxiety  She also complains of not being able to focus completely over the past 2 years and was concerned that her memory was affected as she is unable to focus  Review of Systems   Constitutional: Negative for activity change, appetite change, chills and fever  HENT: Negative for congestion, ear pain, rhinorrhea and sore throat      Eyes: Negative for photophobia and discharge  Respiratory: Negative for cough, chest tightness, shortness of breath, wheezing and stridor  Cardiovascular: Negative for chest pain, palpitations and leg swelling  Gastrointestinal: Negative for abdominal pain, constipation, diarrhea, nausea and vomiting  Endocrine: Negative for cold intolerance, heat intolerance, polydipsia, polyphagia and polyuria  Genitourinary: Negative for dysuria  Skin: Negative for color change, pallor, rash and wound  Neurological: Negative for dizziness, tremors, seizures, syncope, facial asymmetry, speech difficulty, weakness, light-headedness, numbness and headaches  Psychiatric/Behavioral: Positive for decreased concentration  Negative for agitation, behavioral problems, confusion, self-injury, sleep disturbance and suicidal ideas  The patient is nervous/anxious  The patient is not hyperactive  Objective:      /60 (BP Location: Left arm, Patient Position: Sitting)   Pulse 63   Resp 16   Ht 5' 2" (1 575 m)   Wt 54 kg (119 lb)   SpO2 99%   BMI 21 77 kg/m²          Physical Exam   Constitutional: She is oriented to person, place, and time  She appears well-developed and well-nourished  No distress  HENT:   Head: Normocephalic and atraumatic  Nose: Nose normal    Mouth/Throat: Oropharynx is clear and moist  No oropharyngeal exudate  Eyes: Conjunctivae are normal  Right eye exhibits no discharge  Left eye exhibits no discharge  No scleral icterus  Neck: Normal range of motion  Neck supple  No JVD present  No tracheal deviation present  No thyromegaly present  Cardiovascular: Normal rate, regular rhythm, normal heart sounds and intact distal pulses  Exam reveals no gallop and no friction rub  No murmur heard  Pulmonary/Chest: Effort normal and breath sounds normal  No stridor  No respiratory distress  She has no wheezes  She has no rales  She exhibits no tenderness  Abdominal: Soft   Bowel sounds are normal  She exhibits no distension and no mass  There is no tenderness  There is no rebound and no guarding  Musculoskeletal: Normal range of motion  She exhibits no edema, tenderness or deformity  Patient was alert and oriented ×3  Able to perform subtraction of 7's without any issues  Recall 2/3 objects  Neurologic exam within normal limits     Neurological: She is alert and oriented to person, place, and time  She has normal reflexes  She displays normal reflexes  No cranial nerve deficit  Skin: Skin is warm  No rash noted  She is not diaphoretic  No erythema  No pallor  Psychiatric: She has a normal mood and affect

## 2018-06-06 NOTE — ASSESSMENT & PLAN NOTE
Requisition for TSH with reflex T4 given to patient    Requisition for referral to psychiatry ([facility]) also given to patient  Will defer starting any pharmacotherapy at this time due to patient's prior successful history of treatment with behavioral therapy  Advised if symptoms worsen to RTC while awaiting psychiatric appointment

## 2018-06-12 LAB — TSH SERPL DL<=0.005 MIU/L-ACNC: 2.33 UIU/ML (ref 0.45–4.5)

## 2018-06-26 ENCOUNTER — TELEPHONE (OUTPATIENT)
Dept: FAMILY MEDICINE CLINIC | Facility: CLINIC | Age: 33
End: 2018-06-26

## 2018-07-02 ENCOUNTER — TELEPHONE (OUTPATIENT)
Dept: FAMILY MEDICINE CLINIC | Facility: CLINIC | Age: 33
End: 2018-07-02

## 2018-07-02 NOTE — TELEPHONE ENCOUNTER
Cherri never call her back for 2 weeks? ?? Please let her know her blood work is OK, but she also need PCP from Dr Carreno [facility] team

## 2018-08-06 ENCOUNTER — OFFICE VISIT (OUTPATIENT)
Dept: FAMILY MEDICINE CLINIC | Facility: CLINIC | Age: 33
End: 2018-08-06
Payer: COMMERCIAL

## 2018-08-06 VITALS
TEMPERATURE: 102.1 F | SYSTOLIC BLOOD PRESSURE: 100 MMHG | DIASTOLIC BLOOD PRESSURE: 60 MMHG | WEIGHT: 119 LBS | BODY MASS INDEX: 21.77 KG/M2 | OXYGEN SATURATION: 99 % | HEART RATE: 97 BPM

## 2018-08-06 DIAGNOSIS — R50.9 FEVER, UNSPECIFIED FEVER CAUSE: ICD-10-CM

## 2018-08-06 DIAGNOSIS — N39.0 URINARY TRACT INFECTION WITH HEMATURIA, SITE UNSPECIFIED: Primary | ICD-10-CM

## 2018-08-06 DIAGNOSIS — R31.9 URINARY TRACT INFECTION WITH HEMATURIA, SITE UNSPECIFIED: Primary | ICD-10-CM

## 2018-08-06 LAB
SL AMB  POCT GLUCOSE, UA: ABNORMAL
SL AMB LEUKOCYTE ESTERASE,UA: 500
SL AMB POCT BILIRUBIN,UA: ABNORMAL
SL AMB POCT BLOOD,UA: 250
SL AMB POCT CLARITY,UA: CLEAR
SL AMB POCT COLOR,UA: YELLOW
SL AMB POCT KETONES,UA: ABNORMAL
SL AMB POCT NITRITE,UA: ABNORMAL
SL AMB POCT PH,UA: 7
SL AMB POCT SPECIFIC GRAVITY,UA: 1
SL AMB POCT URINE PROTEIN: 30
SL AMB POCT UROBILINOGEN: 1

## 2018-08-06 PROCEDURE — 81002 URINALYSIS NONAUTO W/O SCOPE: CPT | Performed by: NURSE PRACTITIONER

## 2018-08-06 PROCEDURE — 99213 OFFICE O/P EST LOW 20 MIN: CPT | Performed by: NURSE PRACTITIONER

## 2018-08-06 RX ORDER — CLONAZEPAM 0.5 MG/1
0.25 TABLET ORAL 2 TIMES DAILY
COMMUNITY
End: 2019-07-18 | Stop reason: ALTCHOICE

## 2018-08-06 RX ORDER — SULFAMETHOXAZOLE AND TRIMETHOPRIM 800; 160 MG/1; MG/1
1 TABLET ORAL EVERY 12 HOURS SCHEDULED
Qty: 6 TABLET | Refills: 0 | Status: SHIPPED | OUTPATIENT
Start: 2018-08-06 | End: 2018-08-09

## 2018-08-06 NOTE — PROGRESS NOTES
Assessment/Plan:  1  Drink plenty fluids will feeling ill  2  Urinate every 4 hours  3  follow-up condition changes or worsens       Diagnoses and all orders for this visit:    Urinary tract infection with hematuria, site unspecified  -     sulfamethoxazole-trimethoprim (BACTRIM DS) 800-160 mg per tablet; Take 1 tablet by mouth every 12 (twelve) hours for 3 days    Fever, unspecified fever cause  -     POCT urine dip  -     Urine culture    Other orders  -     clonazePAM (KlonoPIN) 0 5 mg tablet; Take 0 25 mg by mouth 2 (two) times a day          Subjective:      Patient ID: Andrea Li is a 34 y o  female  A 53-year-old female presents with dysuria for about a week  Has a fever  Feels some sweats and headache  Right side tenderness in the back  Denies medications  Denies history of kidney stones  Denies STIs  Last menstrual period 3 days ago  The following portions of the patient's history were reviewed and updated as appropriate: allergies and current medications  Review of Systems   Constitutional: Negative  Genitourinary: Positive for dysuria, flank pain, frequency and urgency  Objective:      /60   Pulse 97   Temp (!) 102 1 °F (38 9 °C) (Tympanic)   Wt 54 kg (119 lb)   SpO2 99%   Breastfeeding? No   BMI 21 77 kg/m²          Physical Exam   Constitutional: She appears well-developed and well-nourished  Cardiovascular: Normal rate, regular rhythm and normal heart sounds  Pulmonary/Chest: Effort normal and breath sounds normal    Abdominal: Soft   Bowel sounds are normal    No CVA tenderness

## 2018-08-09 LAB
BACTERIA UR CULT: ABNORMAL
Lab: ABNORMAL
SL AMB ANTIMICROBIAL SUSCEPTIBILITY: ABNORMAL

## 2019-07-18 ENCOUNTER — OFFICE VISIT (OUTPATIENT)
Dept: FAMILY MEDICINE CLINIC | Facility: CLINIC | Age: 34
End: 2019-07-18
Payer: COMMERCIAL

## 2019-07-18 VITALS
OXYGEN SATURATION: 99 % | RESPIRATION RATE: 19 BRPM | HEIGHT: 62 IN | DIASTOLIC BLOOD PRESSURE: 70 MMHG | HEART RATE: 60 BPM | WEIGHT: 119 LBS | BODY MASS INDEX: 21.9 KG/M2 | SYSTOLIC BLOOD PRESSURE: 108 MMHG

## 2019-07-18 DIAGNOSIS — R10.84 GENERALIZED ABDOMINAL PAIN: ICD-10-CM

## 2019-07-18 DIAGNOSIS — B36.0 TINEA VERSICOLOR: ICD-10-CM

## 2019-07-18 DIAGNOSIS — Z12.4 SCREENING FOR CERVICAL CANCER: Primary | ICD-10-CM

## 2019-07-18 PROCEDURE — 3725F SCREEN DEPRESSION PERFORMED: CPT | Performed by: FAMILY MEDICINE

## 2019-07-18 PROCEDURE — 3008F BODY MASS INDEX DOCD: CPT | Performed by: FAMILY MEDICINE

## 2019-07-18 PROCEDURE — 99213 OFFICE O/P EST LOW 20 MIN: CPT | Performed by: FAMILY MEDICINE

## 2019-07-18 RX ORDER — KETOCONAZOLE 200 MG/1
TABLET ORAL
Qty: 2 TABLET | Refills: 0 | Status: SHIPPED | OUTPATIENT
Start: 2019-07-18 | End: 2019-07-18

## 2019-07-18 NOTE — PROGRESS NOTES
Assessment/Plan:     Diagnoses and all orders for this visit:    Screening for cervical cancer  · Patient with history of PCR positive for high risk HPV in the past  · Colposcopy was performed in 11/2016 which did not show intraepithelial lesion at that time   · Speculum exam and PAP with cervical broom and cytobrush performed at today's visit  · Specimen sent for cervical cytology testing  · Will follow-up on results of PAP smear when they become available and relay results to patient  · Date of next PAP smear to be determined pending current PAP smear results  · Pap IG, Rfx HPV ASCU    Tinea versicolor  · Rash consistent with tinea versicolor by physical examination and examination with Eureka Peak' lamp  · Patient prescribed ketoconazole 200 mg tablet x 2  · Instructed patient to take two 200 mg tablets as a single dose  · Patient informed that the rash may remain visible for up to 30 days following treatment - should rash persist beyond this point despite treatment, patient instructed to notify clinic  · ketoconazole (NIZORAL) 200 mg tablet; Two tabs in one dose PO      Generalized abdominal pain  · Patient symptoms of intense, generalized abdominal pain, bloating and diarrhea following consumption of bread products is concerning for possible gluten sensitivity vs Celiac disease  · Education provided to patient regarding gluten sensitivity vs Celiac disease   · Discussed with patient trying to eliminate both coffee and bread products from her diet (seperately) to see if eliminating one versus the other provides relief of her symptoms  · Patient states that this may be difficult as she relies on coffee especially at work to help keep her awake and loves bread products however, she is agreeable to trying this to see if it makes a difference  · Order placed for patient to have Celiac Disease Panel - will follow up on the results of the panel when they become available  · Gastroenterology referral placed  · Celiac Disease Panel; Future  · Ambulatory referral to Gastroenterology; Future    Subjective:     EMMA Chua is a 29 y/o  female with PMHx significant for anxiety who presents to the clinic today for routine PAP smear  Also endorses ongoing symptoms of generalized abdominal pain, bloating and diarrhea as well as a "rash" on her chest      Routine PAP smear:     Patient states that approximately three years ago (during her last pregnancy) she was told that she had HPV (PCR was positive for high risk HPV)  She subsequently had a colposcopy performed here at [facility] in 2016 which did not show evidence of intraepithelial lesion by cytology  Patient was instructed to have repeat PAP in one year but reports that she has not had a PAP since that point in time  Reports that her menstrual cycles are fairly regular (approximately every 28 days)  States that her menses typically lasts for around 5 days and denies any dysmenorrhea or menorrhagia  Patient is currently sexually active with one male partner (her )  Denies any concern for exposure to sexually transmitted infections  Denies any vaginal pain, discharge or abnormal vaginal bleeding  Also denies any increased urinary frequency, hesitancy, dysuria or urinary incontinence  Abdominal pain:    Patient states that over the past several months she has experienced intermittent episodes of intense abdominal cramping, bloating and diarrhea  Symptoms generally occur after the patient consumes bread products and coffee  Symptoms generally begin 30 minutes after consuming the coffee or bread products and last for one hour after onset  Denies any melena, hematochezia or visible mucous in the stool  When symptoms do occur, the patient states that the pain is severe enough that it inhibits her ability to function, 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)  The patient denies any family history of gluten sensitivity or Celiac disease   Has not tried eliminating either bread products or coffee from her diet to see if this alleviates her symptoms  Denies any similar symptoms with other foods such as dairy products  Rash:    Patient states that she has a rash over the center of her chest which she first noticed in the beginning of the summer months  Since then, the rash has not gone away  Endorses some mild pruritis associated with the rash  Of note, the patient states that she had a similar rash in the same area the previous summer which went away on its own  Review of Systems   Constitutional: Negative for chills and fever  HENT: Negative  Eyes: Negative  Respiratory: Negative for cough, chest tightness and shortness of breath  Cardiovascular: Negative for chest pain and palpitations  Gastrointestinal: Positive for abdominal pain and diarrhea  Negative for blood in stool, constipation and vomiting  Genitourinary: Negative for difficulty urinating, dyspareunia, dysuria, frequency, hematuria, menstrual problem, pelvic pain, urgency, vaginal discharge and vaginal pain  Musculoskeletal: Negative  Skin: Positive for rash  Neurological: Negative  Psychiatric/Behavioral: Negative  Objective:    /70   Pulse 60   Resp 19   Ht 5' 2" (1 575 m)   Wt 54 kg (119 lb)   SpO2 99%   BMI 21 77 kg/m²      Physical Exam   Constitutional: She appears well-developed and well-nourished  No distress  Genitourinary: Vagina normal  Pelvic exam was performed with patient supine  There is no rash, tenderness or lesion on the right labia  There is no rash, tenderness or lesion on the left labia  Cervix exhibits no discharge  No erythema or bleeding in the vagina  No vaginal discharge found  Neurological: She is alert  Skin: Skin is warm and dry  Rash noted  Rash is macular  Addendum: Please note, the below procedure for colposcopy was created in error  Colposcopy was not performed at today's office visit  Speculum exam and PAP with cervical broom and cytobrush was performed  Specimens were sent for cervical cytology testing  Patient tolerated the procedure well  Colposcopy  Date/Time: 7/18/2019 7:43 PM  Performed by: Jinny Beckford DO  Authorized by: Jinny Beckford DO     Consent:     Consent obtained:  Verbal    Consent given by:  Patient    Procedural risks discussed:  Bleeding and infection    Patient questions answered: yes      Patient agrees, verbalizes understanding, and wants to proceed: yes    Indication:     Indications: PCR positive for high risk HPV in past   Procedure:     Procedure comment:  Colposcopy was performed under the supervision of Dr Miladis Wells  Cervical PAP smear was performed during the procedure  The squamocolumnar junction was visualized in its entirety  No abnormalities of the vaginal vault were noted  Under satisfactory analgesia the patient was prepped and draped in the dorsal lithotomy position: yes      Deane speculum was placed in the vagina: yes      Under colposcopic examination the transition zone was seen in entirety: yes      Specimen to pathology: yes    Post-procedure:     Patient tolerance of procedure:   Tolerated well, no immediate complications

## 2019-07-22 LAB
CYTOLOGIST CVX/VAG CYTO: NORMAL
DX ICD CODE: NORMAL
OTHER STN SPEC: NORMAL
OTHER STN SPEC: NORMAL
PATH REPORT.FINAL DX SPEC: NORMAL
SL AMB NOTE:: NORMAL
SL AMB SPECIMEN ADEQUACY: NORMAL
SL AMB TEST METHODOLOGY: NORMAL

## 2019-07-25 LAB
ENDOMYSIUM IGA SER QL: NEGATIVE
IGA SERPL-MCNC: 309 MG/DL (ref 87–352)
TTG IGA SER-ACNC: <2 U/ML (ref 0–3)

## 2019-07-26 ENCOUNTER — TELEPHONE (OUTPATIENT)
Dept: FAMILY MEDICINE CLINIC | Facility: CLINIC | Age: 34
End: 2019-07-26

## 2019-07-26 DIAGNOSIS — Z12.4 SCREENING FOR CERVICAL CANCER: Primary | ICD-10-CM

## 2019-07-26 NOTE — TELEPHONE ENCOUNTER
Attempted to contact patient at [phone] at 8:15 AM on 7/26/19 to discuss the results of her PAP smear and recent lab results  Left message instructing patient to call clinic at [phone] so that we could discuss the results of the above tests in further detail

## 2019-07-26 NOTE — TELEPHONE ENCOUNTER
Returned phone call to Dennise at [phone]  Discussed results of Pap cytology with patient  Informed patient that while Pap cytology came back normal for most recent Pap smear performed on 7/18/19, given her history of positive HPV on Pap from March 2016 and colposcopy with cervical biopsy in November 2016 that showed chronic inflammation and squamous atypia favoring CIN1, per Dr Monique Atkins recommendations ([facility]), the patient should have repeat colposcopy with HPV testing performed  Additionally, Pap cytology from November 2016 was negative  Patient agreeable to repeat colposcopy with HPV testing at this time  Will place order for colposcopy with biopsy and HPV testing  Patient also informed that labs ordered to screen for Celiac disease were normal  Given reliability of both TTG IGA and Endomysial IGA antibody testing, patient informed that suspicion for Celiac disease is low at this time  Patient reports continued abdominal pain/cramping and diarrhea which per her, only occurs when she has the combination of coffee and bread products  Denies symptoms when either is consumed individually  Patient was holding off on GI referral until lab results were back  Given persistent symptoms, patient will call to schedule GI appointment for further evaluation

## 2019-09-16 ENCOUNTER — OFFICE VISIT (OUTPATIENT)
Dept: FAMILY MEDICINE CLINIC | Facility: CLINIC | Age: 34
End: 2019-09-16
Payer: COMMERCIAL

## 2019-09-16 VITALS
RESPIRATION RATE: 20 BRPM | DIASTOLIC BLOOD PRESSURE: 60 MMHG | HEIGHT: 62 IN | TEMPERATURE: 98.6 F | OXYGEN SATURATION: 99 % | HEART RATE: 56 BPM | BODY MASS INDEX: 21.84 KG/M2 | WEIGHT: 118.7 LBS | SYSTOLIC BLOOD PRESSURE: 100 MMHG

## 2019-09-16 DIAGNOSIS — R30.0 DYSURIA: Primary | ICD-10-CM

## 2019-09-16 LAB
SL AMB  POCT GLUCOSE, UA: ABNORMAL
SL AMB LEUKOCYTE ESTERASE,UA: 75
SL AMB POCT BILIRUBIN,UA: ABNORMAL
SL AMB POCT BLOOD,UA: 50
SL AMB POCT CLARITY,UA: CLEAR
SL AMB POCT COLOR,UA: YELLOW
SL AMB POCT KETONES,UA: ABNORMAL
SL AMB POCT NITRITE,UA: ABNORMAL
SL AMB POCT PH,UA: 6
SL AMB POCT SPECIFIC GRAVITY,UA: 1.01
SL AMB POCT URINE PROTEIN: ABNORMAL
SL AMB POCT UROBILINOGEN: ABNORMAL

## 2019-09-16 PROCEDURE — 3008F BODY MASS INDEX DOCD: CPT | Performed by: FAMILY MEDICINE

## 2019-09-16 PROCEDURE — 99213 OFFICE O/P EST LOW 20 MIN: CPT | Performed by: FAMILY MEDICINE

## 2019-09-16 PROCEDURE — 81002 URINALYSIS NONAUTO W/O SCOPE: CPT | Performed by: FAMILY MEDICINE

## 2019-09-17 RX ORDER — AMOXICILLIN AND CLAVULANATE POTASSIUM 875; 125 MG/1; MG/1
1 TABLET, FILM COATED ORAL EVERY 12 HOURS SCHEDULED
Qty: 10 TABLET | Refills: 0 | Status: SHIPPED | OUTPATIENT
Start: 2019-09-17 | End: 2019-09-22

## 2019-09-19 LAB
BACTERIA UR CULT: ABNORMAL
Lab: ABNORMAL
SL AMB ANTIMICROBIAL SUSCEPTIBILITY: ABNORMAL

## 2019-10-18 NOTE — PROGRESS NOTES
Assessment/Plan:       Diagnoses and all orders for this visit:    Dysuria  -     POCT urine dip  -     Urine culture  -     amoxicillin-clavulanate (AUGMENTIN) 875-125 mg per tablet; Take 1 tablet by mouth every 12 (twelve) hours for 5 days    Dysuria likely due to urinary tract infection  Will follow up results with patient     RTO if symptoms worsen         Subjective:      Patient ID: Zaria Hernandez is a 30 y o  female  31 y/o female presents complaining of pain and burning with urination  She has noticed that she is urinating more frequently  Admits to increased urgency and hesitancy  Denies any blood in her urine  Denies vaginal discharge, itching or bleeding  Denies fever, chills, or shortness of breath  The following portions of the patient's history were reviewed and updated as appropriate: allergies, current medications, past family history, past medical history, past social history, past surgical history and problem list     Review of Systems   Constitutional: Negative for activity change, appetite change, chills, fatigue and fever  Respiratory: Negative for cough, shortness of breath and wheezing  Cardiovascular: Negative for chest pain, palpitations and leg swelling  Gastrointestinal: Negative for abdominal pain, constipation, diarrhea, nausea and vomiting  Genitourinary: Positive for dysuria, frequency and urgency  Negative for hematuria, vaginal bleeding, vaginal discharge and vaginal pain  Musculoskeletal: Negative  Skin: Negative  Psychiatric/Behavioral: Negative  Objective:      /60 (BP Location: Left arm, Patient Position: Sitting, Cuff Size: Adult)   Pulse 56   Temp 98 6 °F (37 °C) (Tympanic)   Resp 20   Ht 5' 2" (1 575 m)   Wt 53 8 kg (118 lb 11 2 oz)   SpO2 99%   BMI 21 71 kg/m²          Physical Exam   Constitutional: She is oriented to person, place, and time  She appears well-developed and well-nourished  No distress     Cardiovascular: Normal rate, regular rhythm and normal heart sounds  No murmur heard  Pulmonary/Chest: Effort normal and breath sounds normal  No respiratory distress  She has no wheezes  She exhibits no tenderness  Abdominal: Soft  Bowel sounds are normal  She exhibits no distension and no mass  There is no tenderness  There is no rebound and no guarding  Musculoskeletal: Normal range of motion  She exhibits no edema, tenderness or deformity  Neurological: She is alert and oriented to person, place, and time  Skin: Skin is warm and dry  No rash noted  She is not diaphoretic  No erythema  No pallor  Psychiatric: She has a normal mood and affect  Her behavior is normal  Judgment and thought content normal    Nursing note and vitals reviewed

## 2020-03-22 ENCOUNTER — TELEPHONE (OUTPATIENT)
Dept: OTHER | Facility: OTHER | Age: 35
End: 2020-03-22

## 2020-03-22 ENCOUNTER — NURSE TRIAGE (OUTPATIENT)
Dept: OTHER | Facility: OTHER | Age: 35
End: 2020-03-22

## 2020-03-22 DIAGNOSIS — F41.9 ANXIETY: Primary | ICD-10-CM

## 2020-03-23 ENCOUNTER — TELEMEDICINE (OUTPATIENT)
Dept: FAMILY MEDICINE CLINIC | Facility: CLINIC | Age: 35
End: 2020-03-23
Payer: COMMERCIAL

## 2020-03-23 DIAGNOSIS — F41.9 ANXIETY: Primary | ICD-10-CM

## 2020-03-23 PROCEDURE — 99214 OFFICE O/P EST MOD 30 MIN: CPT | Performed by: FAMILY MEDICINE

## 2020-03-23 RX ORDER — HYDROXYZINE HYDROCHLORIDE 10 MG/1
10 TABLET, FILM COATED ORAL EVERY 6 HOURS PRN
Qty: 30 TABLET | Refills: 0 | Status: SHIPPED | OUTPATIENT
Start: 2020-03-23 | End: 2020-04-03 | Stop reason: SDUPTHER

## 2020-03-23 RX ORDER — HYDROXYZINE HYDROCHLORIDE 10 MG/1
10 TABLET, FILM COATED ORAL EVERY 6 HOURS PRN
Qty: 30 TABLET | Refills: 0 | Status: SHIPPED | OUTPATIENT
Start: 2020-03-23 | End: 2020-04-03 | Stop reason: DRUGHIGH

## 2020-03-23 NOTE — TELEPHONE ENCOUNTER
COVID-19 Virtual Visit     This virtual check-in was done via telephone  Encounter provider Melida Crawley RN    Provider located at [address] [place] [id number] [phone]    Recent Visits  Date Type Provider Dept   03/22/20 Telephone Radha Truong, [address] recent visits within past 7 days and meeting all other requirements     Future Appointments  No visits were found meeting these conditions  Showing future appointments within next 150 days and meeting all other requirements        Patient agrees to participate in a virtual check in via telephone or video visit instead of presenting to the office to address urgent/immediate medical needs  Patient is aware this is a billable service  After connecting through [employer], the patient was identified by name and date of birth  Lucia Ayala was informed that this was a telemedicine visit and that the exam was being conducted confidentially over secure lines  My office door was closed  No one else was in the room  Lucia Ayala acknowledged consent and understanding of privacy and security of the telemedicine visit  I informed the patient that I have reviewed her record in Epic and presented the opportunity for her to ask any questions regarding the visit today  The patient agreed to participate  Lucia Ayala is a 30 y o  female who is concerned about COVID-19  She reports nervous  She has not traveled outside the [place]  within the last 14 days    She has not had contact with a person who is under investigation for or who is positive for COVID-19 within the last 14 days  She has not been hospitalized recently for fever and/or lower respiratory symptoms      Past Medical History:   Diagnosis Date    Anxiety 6/5/2018    Asthma     Varicella        Past Surgical History:   Procedure Laterality Date    CHOLECYSTECTOMY  2008    CONDYLOMA EXCISION/FULGURATION      SALPINGECTOMY Bilateral 7/3/2017    Procedure: SALPINGECTOMY;  Surgeon: Jovita Tavares MD;  Location: [facility];  Service: Gynecology       Current Outpatient Medications   Medication Sig Dispense Refill    hydrOXYzine HCL (ATARAX) 10 mg tablet Take 1 tablet (10 mg total) by mouth every 6 (six) hours as needed for anxiety 30 tablet 0    hydrOXYzine HCL (ATARAX) 10 mg tablet Take 1 tablet (10 mg total) by mouth every 6 (six) hours as needed for itching 30 tablet 0     No current facility-administered medications for this visit  Allergies   Allergen Reactions    Pollen Extract        Video Exam    [facility] appears healthy, alert, no distress  Patient was on Klonopin for ring her anxiety couple years ago  Patient is requesting a refill on this  As per the patient's chart she was seeing a psychotherapist but was doing poorly at that time  She ceased going to behavioral therapy  Disposition:       After clarifying the patient's history, my suspicion for COVID-19 infection is very low  I spent 15 minutes with the patient during this virtual check-in visit

## 2020-03-23 NOTE — TELEPHONE ENCOUNTER
Regarding: Coronavirus  ----- Message from [facility] sent at 3/22/2020 10:57 PM EDT -----  " " OI am having a little bit of SOB, no cough or fever though   I believe I am having panic attacks and I just can't seem to settle myself down "

## 2020-03-23 NOTE — TELEPHONE ENCOUNTER
Reason for Disposition   [1] No COVID-19 EXPOSURE BUT [2] questions about    Additional Information   Negative: Severe difficulty breathing (e g , struggling for each breath, speak in single words, bluish lips)   Negative: Sounds like a life-threatening emergency to the triager   Negative: [1] Difficulty breathing (shortness of breath) occurs AND [2] onset > 14 days after COVID-19 EXPOSURE (Close Contact)   Negative: [1] Dry cough occurs AND [2] onset > 14 days after COVID-19 EXPOSURE   Negative: [1] Wet cough (i e , white-yellow, yellow, green, or juanita colored sputum) AND [2] onset > 14 days after COVID-19 EXPOSURE   Negative: [1] Common cold symptoms AND [2] onset > 14 days after COVID-19 EXPOSURE   Negative: [1] Difficulty breathing occurs AND [2] within 14 days of COVID-19 EXPOSURE (Close Contact)   Negative: Patient sounds very sick or weak to the triager   Negative: [1] Fever or feeling feverish AND [2] within 14 Days of COVID-19 EXPOSURE (Close Contact)   Negative: [1] Cough occurs AND [2] within 14 days of COVID-19 EXPOSURE   Negative: [1] Fever (or feeling feverish) OR cough occurs AND [2] travel from or living in high risk area (identified by CDC) AND [3] within last 14 days   Negative: [1] COVID-19 EXPOSURE within last 14 days AND [2] mild body aches, chills, diarrhea, headache, runny nose, or sore throat occur   Negative: [1] COVID-19 EXPOSURE within last 14 days AND [2] NO cough, fever, or breathing difficulty AND [3] exposed person is a healthcare worker who was NOT using all recommended personal protective equipment (i e , a respirator-N95 mask, eye protection, gloves, and gown)   Negative: [1] COVID-19 EXPOSURE (Close Contact) within last 14 days AND [2] NO cough, fever, or breathing difficulty   Negative: [1] Travel from or living in high risk area (identified by CDC) AND [2] within last 14 days AND [3] NO cough or fever or breathing difficulty    Answer Assessment - Initial Assessment Questions  1  CONFIRMED CASE: "Who is the person with the confirmed COVID-19 infection that you were exposed to?"      no  2  PLACE of CONTACT: "Where were you when you were exposed to C OVID-19  (coronavirus disease 2019)?" (e g , city, state, country)  no  3  TYPE of CONTACT: "How much contact was there?" (e g , live in same house, work in same office, same school)    none  4  DATE of CONTACT: "When did you have contact with a coronavirus patient?" (e g , days)  none  5  DURATION of CONTACT: "How long were you in contact with the COVID-19 (coronavirus disease) patient?" (e g , a few seconds, passed by person, a few minutes, live with the patient)     N/a  6  SYMPTOMS: "Do you have any symptoms?" (e g , fever, cough, breathing difficulty)  SOB r/t panic attacks  Patient states she has a hx of panic attacks and anxiety  No fever, no cough  7  PREGNANCY OR POSTPARTUM: "Is there any chance you are pregnant?" "When was your last menstrual period?" "Did you deliver in the n  no  8  HIGH RISK: "Do you have any heart or lung problems?  Do you have a weakened immune system?" (e g , CHF, COPD, asthma, HIV positive, chemotherapy, renal failure, diabetes mellitus, sickle cell anemia)  none    Protocols used: CORONAVIRUS (COVID-19) EXPOSURE-ADULT-

## 2020-03-23 NOTE — PROGRESS NOTES
Virtual Brief Visit    Reason for visit:  · Panic Attack      Encounter provider Rahul Eugene MD    Provider located at [address] [place] [id number]      Recent Visits  Date Type Provider Dept   03/22/20 Telephone Elizabeth Washburn, [address] recent visits within past 7 days and meeting all other requirements     Future Appointments  No visits were found meeting these conditions  Showing future appointments within next 150 days and meeting all other requirements        Patient agrees to participate in a virtual check in via telephone or video visit instead of presenting to the office to address urgent/immediate medical needs  Patient is aware this is a billable service  After connecting through telephone, the patient was identified by name and date of birth  Rigoberto Trinh was informed that this was a telemedicine visit and that the visit is being conducted through telephone which may not be secure and therefore might not be HIPAA-compliant  My office door was closed  No one else was in the room  She acknowledged consent and understanding of privacy and security of the virtual check-in visit  I informed the patient that I have reviewed her record in Epic and presented the opportunity for her to ask any questions regarding the visit today  The patient initiated communication and agreed to participate  You Bang is a 30 y o  female  She is being spoke with today with regards to her history of anxiety  States she experienced a panic attack on 3/20/2020  States she also felt like that on 3/22/20  Notes of history of Anxiety/Depression  Not currently on any medication  States she used to follow with [facility]  and would be given Klonopin to help her panic attacks  Last time she used Klonopin was in 2018  Stopped going to [facility]  due to being in school (college)   During her panic attacks - she gets short of breath, and breaths heavily  Resolves within a few minutes or so  States her arm starts to feel tingly  Denies any LOC, headaches, chest pain, chest tightness, diaphoresis, palpitations  States she was prescribed Atarax in the past, but has not taken it, as she know the medication makes you feel sleepy  States she does not want to feel like that all the time  Has not had panic attack in a long time - almost 6 years ago  States she has a hx of Asthma and isn't sure if its related to her lungs on some occasions  Past Medical History:   Diagnosis Date    Anxiety 6/5/2018    Asthma     Varicella        Past Surgical History:   Procedure Laterality Date    CHOLECYSTECTOMY  2008    CONDYLOMA EXCISION/FULGURATION      SALPINGECTOMY Bilateral 7/3/2017    Procedure: SALPINGECTOMY;  Surgeon: Kristan Alford MD;  Location: [address] [place] [address];  Service: Gynecology       Current Outpatient Medications   Medication Sig Dispense Refill    hydrOXYzine HCL (ATARAX) 10 mg tablet Take 1 tablet (10 mg total) by mouth every 6 (six) hours as needed for anxiety 30 tablet 0    hydrOXYzine HCL (ATARAX) 10 mg tablet Take 1 tablet (10 mg total) by mouth every 6 (six) hours as needed for itching 30 tablet 0     No current facility-administered medications for this visit  Allergies   Allergen Reactions    Pollen Extract        Assessment    [facility] telephone assessment is regarding her Panic Attacks   Plan:  · Advised patient to start Atarax 10mg PO q6h prn  · Voiced to patient that I will not prescribe a Benzodiazepine; Given frequency of patients panic attacks, I would prefer her to try Atarax, given the abuse potential with use of Benzodiazepines; Advised that she would benefit from SSRI for long-term control;  Advised to reestablish with Psychiatrist        Disposition:    Patient voiced understanding and in agreement          I spent 20 minutes with the patient during this virtual check-in visit

## 2020-04-03 ENCOUNTER — TELEMEDICINE (OUTPATIENT)
Dept: FAMILY MEDICINE CLINIC | Facility: CLINIC | Age: 35
End: 2020-04-03
Payer: COMMERCIAL

## 2020-04-03 DIAGNOSIS — F41.9 ANXIETY: Primary | ICD-10-CM

## 2020-04-03 PROBLEM — R50.9 FEVER: Status: RESOLVED | Noted: 2018-08-06 | Resolved: 2020-04-03

## 2020-04-03 PROBLEM — Z30.2 ENCOUNTER FOR STERILIZATION: Status: RESOLVED | Noted: 2017-07-06 | Resolved: 2020-04-03

## 2020-04-03 PROBLEM — Z87.09 HISTORY OF ASTHMA: Status: ACTIVE | Noted: 2020-04-03

## 2020-04-03 PROBLEM — N39.0 URINARY TRACT INFECTION WITH HEMATURIA: Status: RESOLVED | Noted: 2018-08-06 | Resolved: 2020-04-03

## 2020-04-03 PROBLEM — R31.9 URINARY TRACT INFECTION WITH HEMATURIA: Status: RESOLVED | Noted: 2018-08-06 | Resolved: 2020-04-03

## 2020-04-03 PROCEDURE — 99213 OFFICE O/P EST LOW 20 MIN: CPT | Performed by: FAMILY MEDICINE

## 2020-04-03 RX ORDER — HYDROXYZINE PAMOATE 50 MG/1
50 CAPSULE ORAL
Qty: 14 CAPSULE | Refills: 0 | Status: SHIPPED | OUTPATIENT
Start: 2020-04-03 | End: 2020-08-12 | Stop reason: ALTCHOICE

## 2020-04-05 ENCOUNTER — APPOINTMENT (EMERGENCY)
Dept: RADIOLOGY | Facility: HOSPITAL | Age: 35
End: 2020-04-05
Payer: COMMERCIAL

## 2020-04-05 ENCOUNTER — HOSPITAL ENCOUNTER (EMERGENCY)
Facility: HOSPITAL | Age: 35
Discharge: HOME | End: 2020-04-05
Attending: EMERGENCY MEDICINE | Admitting: EMERGENCY MEDICINE
Payer: COMMERCIAL

## 2020-04-05 VITALS
SYSTOLIC BLOOD PRESSURE: 148 MMHG | BODY MASS INDEX: 21.58 KG/M2 | HEART RATE: 79 BPM | TEMPERATURE: 97.5 F | DIASTOLIC BLOOD PRESSURE: 88 MMHG | WEIGHT: 118 LBS | RESPIRATION RATE: 20 BRPM | OXYGEN SATURATION: 100 %

## 2020-04-05 DIAGNOSIS — F41.9 ANXIETY: ICD-10-CM

## 2020-04-05 DIAGNOSIS — R06.02 SOB (SHORTNESS OF BREATH): Primary | ICD-10-CM

## 2020-04-05 PROCEDURE — 99284 EMERGENCY DEPT VISIT MOD MDM: CPT | Performed by: PHYSICIAN ASSISTANT

## 2020-04-05 PROCEDURE — 99285 EMERGENCY DEPT VISIT HI MDM: CPT

## 2020-04-05 PROCEDURE — 87635 SARS-COV-2 COVID-19 AMP PRB: CPT | Performed by: PHYSICIAN ASSISTANT

## 2020-04-05 PROCEDURE — 71045 X-RAY EXAM CHEST 1 VIEW: CPT

## 2020-04-05 RX ORDER — ALBUTEROL SULFATE 90 UG/1
2 AEROSOL, METERED RESPIRATORY (INHALATION) ONCE
Status: COMPLETED | OUTPATIENT
Start: 2020-04-05 | End: 2020-04-05

## 2020-04-05 RX ADMIN — ALBUTEROL SULFATE 2 PUFF: 90 AEROSOL, METERED RESPIRATORY (INHALATION) at 16:59

## 2020-04-08 LAB — SARS-COV-2 RNA SPEC QL NAA+PROBE: NOT DETECTED

## 2020-04-20 ENCOUNTER — TELEMEDICINE (OUTPATIENT)
Dept: FAMILY MEDICINE CLINIC | Facility: CLINIC | Age: 35
End: 2020-04-20
Payer: COMMERCIAL

## 2020-04-20 DIAGNOSIS — F41.9 ANXIETY: Primary | ICD-10-CM

## 2020-04-20 PROCEDURE — 99214 OFFICE O/P EST MOD 30 MIN: CPT | Performed by: FAMILY MEDICINE

## 2020-04-20 RX ORDER — SERTRALINE HYDROCHLORIDE 25 MG/1
25 TABLET, FILM COATED ORAL DAILY
Qty: 30 TABLET | Refills: 5 | Status: SHIPPED | OUTPATIENT
Start: 2020-04-20 | End: 2022-01-11

## 2020-05-04 ENCOUNTER — TELEMEDICINE (OUTPATIENT)
Dept: BEHAVIORAL/MENTAL HEALTH CLINIC | Facility: CLINIC | Age: 35
End: 2020-05-04
Payer: COMMERCIAL

## 2020-05-04 DIAGNOSIS — F41.1 GENERALIZED ANXIETY DISORDER: Primary | ICD-10-CM

## 2020-05-04 PROCEDURE — 90834 PSYTX W PT 45 MINUTES: CPT | Performed by: SOCIAL WORKER

## 2020-05-04 PROCEDURE — 1036F TOBACCO NON-USER: CPT | Performed by: SOCIAL WORKER

## 2020-08-12 ENCOUNTER — OFFICE VISIT (OUTPATIENT)
Dept: FAMILY MEDICINE CLINIC | Facility: CLINIC | Age: 35
End: 2020-08-12
Payer: COMMERCIAL

## 2020-08-12 VITALS
SYSTOLIC BLOOD PRESSURE: 128 MMHG | BODY MASS INDEX: 23.55 KG/M2 | RESPIRATION RATE: 18 BRPM | HEART RATE: 63 BPM | HEIGHT: 62 IN | OXYGEN SATURATION: 99 % | DIASTOLIC BLOOD PRESSURE: 92 MMHG | TEMPERATURE: 98.6 F | WEIGHT: 128 LBS

## 2020-08-12 DIAGNOSIS — R07.89 CHEST TIGHTNESS: ICD-10-CM

## 2020-08-12 DIAGNOSIS — R06.02 SHORTNESS OF BREATH: ICD-10-CM

## 2020-08-12 DIAGNOSIS — R00.2 PALPITATIONS: Primary | ICD-10-CM

## 2020-08-12 PROCEDURE — 3008F BODY MASS INDEX DOCD: CPT | Performed by: FAMILY MEDICINE

## 2020-08-12 PROCEDURE — 99213 OFFICE O/P EST LOW 20 MIN: CPT | Performed by: FAMILY MEDICINE

## 2020-08-12 PROCEDURE — 1036F TOBACCO NON-USER: CPT | Performed by: FAMILY MEDICINE

## 2020-08-16 NOTE — PROGRESS NOTES
Assessment/Plan:     Diagnoses and all orders for this visit:    Palpitations  -     TSH, 3rd generation with Free T4 reflex; Future  -     Vitamin B12; Future  -     Vitamin D 25 hydroxy; Future  -     Cancel: Ambulatory referral to Cardiology; Future  -     CBC and differential; Future  -     Comprehensive metabolic panel; Future    Shortness of breath  -     Vitamin D 25 hydroxy; Future  -     Cancel: Ambulatory referral to Cardiology; Future  -     CBC and differential; Future  -     Comprehensive metabolic panel; Future    Chest tightness  -     Ambulatory referral to Cardiology; Future    Will follow results of lab work  Recommend Cardiology evaluation to rule out cardiac causes of chest pain and discomfort   Follow up in 1 month         Subjective:      Patient ID: Lorene Peabody is a 37 y o  female  37year old female presents complaining of chest pain and palpitations that occurs occasionally for the past few months  Patient states that when these episodes come in, it feels as though her chest gets tight and she has difficulty breathing  Reports that she is sure it is not anxiety attacks  Has had panic attacks when these symptoms come on because she is nervous about what is happening  Reports that when she feels this way, she has no energy to the point that she has to immediately lay down and cannot move for some time until the episode is over  Denies any chest pain at this time  Denies fever, chills or nausea  The following portions of the patient's history were reviewed and updated as appropriate: allergies, current medications, past family history, past medical history, past social history, past surgical history and problem list     Review of Systems   Constitutional: Negative for chills, diaphoresis and fever  HENT: Negative for congestion, postnasal drip, rhinorrhea, sinus pressure, sinus pain, sneezing and sore throat      Respiratory: Positive for chest tightness and shortness of breath  Negative for apnea, cough and wheezing  Cardiovascular: Positive for chest pain and palpitations  Negative for leg swelling  Genitourinary: Negative  Musculoskeletal: Negative  Skin: Negative for color change and rash  Neurological: Positive for weakness and light-headedness  Psychiatric/Behavioral: The patient is nervous/anxious  Objective:      /92 (BP Location: Right arm, Patient Position: Sitting)   Pulse 63   Temp 98 6 °F (37 °C) (Tympanic)   Resp 18   Ht 5' 2" (1 575 m)   Wt 58 1 kg (128 lb)   LMP 08/10/2020   SpO2 99%   BMI 23 41 kg/m²          Physical Exam  Vitals signs and nursing note reviewed  Constitutional:       General: She is not in acute distress  Appearance: Normal appearance  She is normal weight  She is not ill-appearing  HENT:      Right Ear: Tympanic membrane, ear canal and external ear normal       Left Ear: Tympanic membrane, ear canal and external ear normal       Mouth/Throat:      Mouth: Mucous membranes are moist       Pharynx: No oropharyngeal exudate or posterior oropharyngeal erythema  Eyes:      Extraocular Movements: Extraocular movements intact  Conjunctiva/sclera: Conjunctivae normal       Pupils: Pupils are equal, round, and reactive to light  Neck:      Musculoskeletal: Normal range of motion and neck supple  Cardiovascular:      Rate and Rhythm: Normal rate and regular rhythm  Pulses: Normal pulses  Heart sounds: No murmur  Pulmonary:      Effort: Pulmonary effort is normal  No respiratory distress  Breath sounds: Normal breath sounds  No wheezing  Abdominal:      General: Bowel sounds are normal  There is no distension  Palpations: There is no mass  Tenderness: There is no abdominal tenderness  Musculoskeletal: Normal range of motion  Neurological:      General: No focal deficit present  Mental Status: She is alert and oriented to person, place, and time   Mental status is at baseline  Psychiatric:         Mood and Affect: Mood normal          Behavior: Behavior normal          Thought Content:  Thought content normal          Judgment: Judgment normal

## 2020-08-18 LAB
25(OH)D3+25(OH)D2 SERPL-MCNC: 22.2 NG/ML (ref 30–100)
ALBUMIN SERPL-MCNC: 4.8 G/DL (ref 3.8–4.8)
ALBUMIN/GLOB SERPL: 2.1 {RATIO} (ref 1.2–2.2)
ALP SERPL-CCNC: 68 IU/L (ref 39–117)
ALT SERPL-CCNC: 14 IU/L (ref 0–32)
AST SERPL-CCNC: 19 IU/L (ref 0–40)
BASOPHILS # BLD AUTO: 0.1 X10E3/UL (ref 0–0.2)
BASOPHILS NFR BLD AUTO: 1 %
BILIRUB SERPL-MCNC: 0.4 MG/DL (ref 0–1.2)
BUN SERPL-MCNC: 9 MG/DL (ref 6–20)
BUN/CREAT SERPL: 13 (ref 9–23)
CALCIUM SERPL-MCNC: 8.9 MG/DL (ref 8.7–10.2)
CHLORIDE SERPL-SCNC: 99 MMOL/L (ref 96–106)
CO2 SERPL-SCNC: 23 MMOL/L (ref 20–29)
CREAT SERPL-MCNC: 0.69 MG/DL (ref 0.57–1)
EOSINOPHIL # BLD AUTO: 0.1 X10E3/UL (ref 0–0.4)
EOSINOPHIL NFR BLD AUTO: 1 %
ERYTHROCYTE [DISTWIDTH] IN BLOOD BY AUTOMATED COUNT: 11.5 % (ref 11.7–15.4)
GLOBULIN SER-MCNC: 2.3 G/DL (ref 1.5–4.5)
GLUCOSE SERPL-MCNC: 82 MG/DL (ref 65–99)
HCT VFR BLD AUTO: 38 % (ref 34–46.6)
HGB BLD-MCNC: 13.4 G/DL (ref 11.1–15.9)
IMM GRANULOCYTES # BLD: 0 X10E3/UL (ref 0–0.1)
IMM GRANULOCYTES NFR BLD: 0 %
LYMPHOCYTES # BLD AUTO: 2.9 X10E3/UL (ref 0.7–3.1)
LYMPHOCYTES NFR BLD AUTO: 32 %
MCH RBC QN AUTO: 33 PG (ref 26.6–33)
MCHC RBC AUTO-ENTMCNC: 35.3 G/DL (ref 31.5–35.7)
MCV RBC AUTO: 94 FL (ref 79–97)
MONOCYTES # BLD AUTO: 0.6 X10E3/UL (ref 0.1–0.9)
MONOCYTES NFR BLD AUTO: 7 %
NEUTROPHILS # BLD AUTO: 5.4 X10E3/UL (ref 1.4–7)
NEUTROPHILS NFR BLD AUTO: 59 %
PLATELET # BLD AUTO: 259 X10E3/UL (ref 150–450)
POTASSIUM SERPL-SCNC: 4.2 MMOL/L (ref 3.5–5.2)
PROT SERPL-MCNC: 7.1 G/DL (ref 6–8.5)
RBC # BLD AUTO: 4.06 X10E6/UL (ref 3.77–5.28)
SL AMB EGFR AFRICAN AMERICAN: 132 ML/MIN/1.73
SL AMB EGFR NON AFRICAN AMERICAN: 115 ML/MIN/1.73
SODIUM SERPL-SCNC: 138 MMOL/L (ref 134–144)
TSH SERPL DL<=0.005 MIU/L-ACNC: 1.89 UIU/ML (ref 0.45–4.5)
VIT B12 SERPL-MCNC: 561 PG/ML (ref 232–1245)
WBC # BLD AUTO: 9.1 X10E3/UL (ref 3.4–10.8)

## 2020-08-29 ENCOUNTER — HOSPITAL ENCOUNTER (EMERGENCY)
Facility: HOSPITAL | Age: 35
Discharge: HOME | End: 2020-08-29
Attending: EMERGENCY MEDICINE
Payer: COMMERCIAL

## 2020-08-29 VITALS
TEMPERATURE: 98.6 F | HEIGHT: 62 IN | BODY MASS INDEX: 23.55 KG/M2 | HEART RATE: 76 BPM | SYSTOLIC BLOOD PRESSURE: 144 MMHG | WEIGHT: 128 LBS | OXYGEN SATURATION: 100 % | RESPIRATION RATE: 18 BRPM | DIASTOLIC BLOOD PRESSURE: 95 MMHG

## 2020-08-29 DIAGNOSIS — R00.2 PALPITATIONS: Primary | ICD-10-CM

## 2020-08-29 PROCEDURE — 99284 EMERGENCY DEPT VISIT MOD MDM: CPT | Performed by: EMERGENCY MEDICINE

## 2020-08-29 PROCEDURE — 99284 EMERGENCY DEPT VISIT MOD MDM: CPT

## 2020-08-29 PROCEDURE — 93005 ELECTROCARDIOGRAM TRACING: CPT

## 2020-08-29 NOTE — DISCHARGE INSTRUCTIONS
Keep a diary of your symptoms and the circumstances they are occurring  Cut out sources of caffeine  Take a vitamin D and/or multivitamin supplement daily  Get further testing by the cardiologist when you can

## 2020-08-29 NOTE — ED PROVIDER NOTES
History  Chief Complaint   Patient presents with    Palpitations     patient tearful during triage, states she has been dealing with palpitations "all the time" for the past 5 months  Seen here in April, was given an inhaler  Went to [facility] and referred to pulmonology and recently referred to cardiology, but no appointments avaiable until October  36 yo female c/o feelings of lightheadedness off and on x 5 months  Feels like her heart is racing or palpitating   +associated nausea, tingling in arms  No actual syncope  She just feels like her BP and "everything" is dropping  She has been seen here a couple months ago and had normal chest xray  She was seen by [facility] last week and had blood work and referral to cardiology  She says she can't get appointment until October however  Today the episode happened while driving but the episodes have been occurring randomly  No fever, cough, vomiting, diarrhea  History provided by:  Patient   used: No    Palpitations   Associated symptoms: nausea and shortness of breath    Associated symptoms: no back pain, no chest pain, no cough, no dizziness and no vomiting        Prior to Admission Medications   Prescriptions Last Dose Informant Patient Reported?  Taking?   sertraline (ZOLOFT) 25 mg tablet 8/28/2020 at 2100  No Yes   Sig: Take 1 tablet (25 mg total) by mouth daily   Patient taking differently: Take 25 mg by mouth daily at bedtime       Facility-Administered Medications: None       Past Medical History:   Diagnosis Date    Anxiety 6/5/2018    Asthma     Psychiatric disorder     Varicella        Past Surgical History:   Procedure Laterality Date    CHOLECYSTECTOMY  2008    CONDYLOMA EXCISION/FULGURATION      SALPINGECTOMY Bilateral 7/3/2017    Procedure: SALPINGECTOMY;  Surgeon: Preet Cabezas MD;  Location: [address] [place] [address];  Service: Gynecology    TUBAL LIGATION         Family History   Problem Relation Age of Onset    No Known Problems Mother     No Known Problems Father     No Known Problems Sister     No Known Problems Brother     No Known Problems Daughter     No Known Problems Son     Stroke Maternal Aunt     No Known Problems Maternal Uncle     No Known Problems Paternal Aunt     No Known Problems Paternal Uncle     Diabetes Maternal Grandmother     Heart disease Maternal Grandmother     No Known Problems Maternal Grandfather     No Known Problems Paternal Grandmother     No Known Problems Paternal Grandfather      I have reviewed and agree with the history as documented  E-Cigarette/Vaping    E-Cigarette Use Never User      E-Cigarette/Vaping Substances    Nicotine No     THC No     CBD No     Flavoring No     Other No     Unknown No      Social History     Tobacco Use    Smoking status: Never Smoker    Smokeless tobacco: Never Used   Substance Use Topics    Alcohol use: No    Drug use: No       Review of Systems   Constitutional: Negative  Negative for fever  HENT: Negative  Eyes: Negative  Respiratory: Positive for shortness of breath  Negative for cough  Cardiovascular: Positive for palpitations  Negative for chest pain  Gastrointestinal: Positive for nausea  Negative for abdominal pain, diarrhea and vomiting  Genitourinary: Negative  Negative for dysuria and flank pain  Musculoskeletal: Negative  Negative for back pain and myalgias  Skin: Negative  Negative for rash and wound  Neurological: Positive for light-headedness  Negative for dizziness and headaches  Hematological: Does not bruise/bleed easily  Psychiatric/Behavioral: Negative  All other systems reviewed and are negative  Physical Exam  Physical Exam  Vitals signs and nursing note reviewed  Constitutional:       General: She is not in acute distress  Appearance: Normal appearance  She is well-developed and normal weight  She is not ill-appearing, toxic-appearing or diaphoretic     HENT:      Head: Normocephalic and atraumatic  Eyes:      Conjunctiva/sclera: Conjunctivae normal    Neck:      Musculoskeletal: Normal range of motion and neck supple  Cardiovascular:      Rate and Rhythm: Normal rate and regular rhythm  Heart sounds: Normal heart sounds  No murmur  Pulmonary:      Effort: Pulmonary effort is normal  No respiratory distress  Breath sounds: Normal breath sounds  Abdominal:      General: There is no distension  Palpations: Abdomen is soft  Musculoskeletal: Normal range of motion  General: No deformity  Right lower leg: No edema  Left lower leg: No edema  Skin:     General: Skin is warm and dry  Coloration: Skin is not pale  Findings: No rash  Neurological:      General: No focal deficit present  Mental Status: She is alert and oriented to person, place, and time  Cranial Nerves: No cranial nerve deficit     Psychiatric:         Mood and Affect: Mood normal          Behavior: Behavior normal          Vital Signs  ED Triage Vitals   Temperature Pulse Respirations Blood Pressure SpO2   08/29/20 1546 08/29/20 1551 08/29/20 1551 08/29/20 1551 08/29/20 1551   98 6 °F (37 °C) 76 18 144/95 100 %      Temp Source Heart Rate Source Patient Position - Orthostatic VS BP Location FiO2 (%)   08/29/20 1546 08/29/20 1551 08/29/20 1551 08/29/20 1551 --   Temporal Monitor Sitting Right arm       Pain Score       --                  Vitals:    08/29/20 1551   BP: 144/95   Pulse: 76   Patient Position - Orthostatic VS: Sitting         Visual Acuity      ED Medications  Medications - No data to display    Diagnostic Studies  Results Reviewed     None                 No orders to display              Procedures  ECG 12 Lead Documentation Only    Date/Time: 8/29/2020 3:54 PM  Performed by: Anya Sharma MD  Authorized by: Anya Sharma MD     Indications / Diagnosis:  Palpitations  ECG reviewed by me, the ED Provider: yes    Patient location:  ED  Previous ECG:     Previous ECG:  Compared to current    Comparison ECG info:  6/26/2015    Similarity:  No change  Interpretation:     Interpretation: abnormal    Rate:     ECG rate:  81    ECG rate assessment: normal    Rhythm:     Rhythm: sinus rhythm    Ectopy:     Ectopy comment:  Sinus arrhythmia  QRS:     QRS axis:  Normal  Conduction:     Conduction: normal    ST segments:     ST segments:  Normal  T waves:     T waves: normal               ED Course       US AUDIT      Most Recent Value   Initial Alcohol Screen: US AUDIT-C    1  How often do you have a drink containing alcohol?  0 Filed at: 08/29/2020 1554   3b  FEMALE Any Age, or MALE 65+: How often do you have 4 or more drinks on one occassion? 0 Filed at: 08/29/2020 1554   Audit-C Score  0 Filed at: 08/29/2020 1554                  YAMILKA/DAST-10      Most Recent Value   How many times in the past year have you    Used an illegal drug or used a prescription medication for non-medical reasons? Never Filed at: 08/29/2020 1547                                MDM  Number of Diagnoses or Management Options  Palpitations:   Diagnosis management comments: Labs reviewed from 10 days ago and all were normal other than vitamin D level  EKG show sinus arrhythmia  Vitals normal   Pt  Advised there is not really any futher testing I can do in the ER that hasn't been done and she really needs to wait and see the cardiologist for specialized testing such as Holter and echo  Advised she keep an diary of her symptoms  Advised cut out sources of caffeine          Disposition  Final diagnoses:   Palpitations     Time reflects when diagnosis was documented in both MDM as applicable and the Disposition within this note     Time User Action Codes Description Comment    7/85/8880  3:92 PM Puneet Negro Add [O43 5] Palpitations       ED Disposition     ED Disposition Condition Date/Time Comment    Discharge Stable Sat Aug 29, 2020  4:05 PM Javier Pheasant discharge to home/self care             Follow-up Information     Follow up With Specialties Details Why Contact Info    the cardiologist as planned              Patient's Medications   Discharge Prescriptions    No medications on file     No discharge procedures on file      PDMP Review     None          ED Provider  Electronically Signed by           Mary Wolf MD  32/43/86 [id number]

## 2020-08-31 LAB
ATRIAL RATE: 81 BPM
P AXIS: 78 DEGREES
PR INTERVAL: 132 MS
QRS AXIS: 76 DEGREES
QRSD INTERVAL: 94 MS
QT INTERVAL: 404 MS
QTC INTERVAL: 469 MS
T WAVE AXIS: 55 DEGREES
VENTRICULAR RATE: 81 BPM

## 2020-08-31 PROCEDURE — 93010 ELECTROCARDIOGRAM REPORT: CPT | Performed by: INTERNAL MEDICINE

## 2020-09-30 PROBLEM — R00.2 PALPITATIONS: Status: ACTIVE | Noted: 2020-09-30

## 2020-10-01 ENCOUNTER — CONSULT (OUTPATIENT)
Dept: CARDIOLOGY CLINIC | Facility: CLINIC | Age: 35
End: 2020-10-01
Payer: COMMERCIAL

## 2020-10-01 VITALS
HEART RATE: 74 BPM | DIASTOLIC BLOOD PRESSURE: 84 MMHG | SYSTOLIC BLOOD PRESSURE: 122 MMHG | TEMPERATURE: 98.8 F | WEIGHT: 131 LBS | OXYGEN SATURATION: 99 % | HEIGHT: 62 IN | BODY MASS INDEX: 24.11 KG/M2

## 2020-10-01 DIAGNOSIS — R07.89 CHEST TIGHTNESS: ICD-10-CM

## 2020-10-01 DIAGNOSIS — E55.9 VITAMIN D DEFICIENCY: ICD-10-CM

## 2020-10-01 DIAGNOSIS — R00.2 PALPITATIONS: ICD-10-CM

## 2020-10-01 DIAGNOSIS — Z87.09 HISTORY OF ASTHMA: ICD-10-CM

## 2020-10-01 DIAGNOSIS — F41.9 ANXIETY: ICD-10-CM

## 2020-10-01 DIAGNOSIS — R06.02 SHORTNESS OF BREATH: ICD-10-CM

## 2020-10-01 PROCEDURE — 99205 OFFICE O/P NEW HI 60 MIN: CPT | Performed by: INTERNAL MEDICINE

## 2020-10-01 PROCEDURE — 1036F TOBACCO NON-USER: CPT | Performed by: INTERNAL MEDICINE

## 2020-10-01 PROCEDURE — 93000 ELECTROCARDIOGRAM COMPLETE: CPT | Performed by: INTERNAL MEDICINE

## 2020-10-20 ENCOUNTER — HOSPITAL ENCOUNTER (OUTPATIENT)
Dept: NON INVASIVE DIAGNOSTICS | Facility: HOSPITAL | Age: 35
Discharge: HOME | End: 2020-10-20
Attending: INTERNAL MEDICINE
Payer: COMMERCIAL

## 2020-10-20 DIAGNOSIS — R00.2 PALPITATIONS: ICD-10-CM

## 2020-10-20 DIAGNOSIS — R07.89 CHEST TIGHTNESS: ICD-10-CM

## 2020-10-20 DIAGNOSIS — E55.9 VITAMIN D DEFICIENCY: ICD-10-CM

## 2020-10-20 DIAGNOSIS — Z87.09 HISTORY OF ASTHMA: ICD-10-CM

## 2020-10-20 DIAGNOSIS — R06.02 SHORTNESS OF BREATH: ICD-10-CM

## 2020-10-20 DIAGNOSIS — F41.9 ANXIETY: ICD-10-CM

## 2020-10-20 LAB
BASOPHILS # BLD AUTO: 0.1 X10E3/UL (ref 0–0.2)
BASOPHILS NFR BLD AUTO: 1 %
CHEST PAIN STATEMENT: NORMAL
EOSINOPHIL # BLD AUTO: 0.2 X10E3/UL (ref 0–0.4)
EOSINOPHIL NFR BLD AUTO: 2 %
ERYTHROCYTE [DISTWIDTH] IN BLOOD BY AUTOMATED COUNT: 11.7 % (ref 11.7–15.4)
HCT VFR BLD AUTO: 38.4 % (ref 34–46.6)
HGB BLD-MCNC: 13.1 G/DL (ref 11.1–15.9)
IMM GRANULOCYTES # BLD: 0 X10E3/UL (ref 0–0.1)
IMM GRANULOCYTES NFR BLD: 0 %
LYMPHOCYTES # BLD AUTO: 2.5 X10E3/UL (ref 0.7–3.1)
LYMPHOCYTES NFR BLD AUTO: 34 %
MAX DIASTOLIC BP: 80 MMHG
MAX HEART RATE: 169 BPM
MAX PREDICTED HEART RATE: 187 BPM
MAX. SYSTOLIC BP: 158 MMHG
MCH RBC QN AUTO: 32 PG (ref 26.6–33)
MCHC RBC AUTO-ENTMCNC: 34.1 G/DL (ref 31.5–35.7)
MCV RBC AUTO: 94 FL (ref 79–97)
MONOCYTES # BLD AUTO: 0.6 X10E3/UL (ref 0.1–0.9)
MONOCYTES NFR BLD AUTO: 8 %
NEUTROPHILS # BLD AUTO: 4.1 X10E3/UL (ref 1.4–7)
NEUTROPHILS NFR BLD AUTO: 55 %
PLATELET # BLD AUTO: 269 X10E3/UL (ref 150–450)
PROTOCOL NAME: NORMAL
RBC # BLD AUTO: 4.09 X10E6/UL (ref 3.77–5.28)
REASON FOR TERMINATION: NORMAL
TARGET HR FORMULA: NORMAL
TIME IN EXERCISE PHASE: NORMAL
WBC # BLD AUTO: 7.5 X10E3/UL (ref 3.4–10.8)

## 2020-10-20 PROCEDURE — 93017 CV STRESS TEST TRACING ONLY: CPT

## 2020-10-20 PROCEDURE — 93018 CV STRESS TEST I&R ONLY: CPT | Performed by: INTERNAL MEDICINE

## 2020-10-20 PROCEDURE — 93016 CV STRESS TEST SUPVJ ONLY: CPT | Performed by: INTERNAL MEDICINE

## 2020-10-21 ENCOUNTER — TELEPHONE (OUTPATIENT)
Dept: CARDIOLOGY CLINIC | Facility: CLINIC | Age: 35
End: 2020-10-21

## 2020-10-28 ENCOUNTER — HOSPITAL ENCOUNTER (OUTPATIENT)
Dept: NON INVASIVE DIAGNOSTICS | Facility: HOSPITAL | Age: 35
Discharge: HOME | End: 2020-10-28
Attending: INTERNAL MEDICINE
Payer: COMMERCIAL

## 2020-10-28 DIAGNOSIS — R00.2 PALPITATIONS: ICD-10-CM

## 2020-10-28 DIAGNOSIS — F41.9 ANXIETY: ICD-10-CM

## 2020-10-28 DIAGNOSIS — R07.89 CHEST TIGHTNESS: ICD-10-CM

## 2020-10-28 DIAGNOSIS — R06.02 SHORTNESS OF BREATH: ICD-10-CM

## 2020-10-28 DIAGNOSIS — Z87.09 HISTORY OF ASTHMA: ICD-10-CM

## 2020-10-28 DIAGNOSIS — E55.9 VITAMIN D DEFICIENCY: ICD-10-CM

## 2020-10-28 PROCEDURE — 93306 TTE W/DOPPLER COMPLETE: CPT

## 2020-10-28 PROCEDURE — 93306 TTE W/DOPPLER COMPLETE: CPT | Performed by: INTERNAL MEDICINE

## 2020-10-28 PROCEDURE — 93225 XTRNL ECG REC<48 HRS REC: CPT

## 2020-10-28 PROCEDURE — 93226 XTRNL ECG REC<48 HR SCAN A/R: CPT

## 2020-11-02 ENCOUNTER — TELEPHONE (OUTPATIENT)
Dept: CARDIOLOGY CLINIC | Facility: CLINIC | Age: 35
End: 2020-11-02

## 2020-11-06 PROCEDURE — 93227 XTRNL ECG REC<48 HR R&I: CPT | Performed by: INTERNAL MEDICINE

## 2020-11-09 ENCOUNTER — TELEPHONE (OUTPATIENT)
Dept: CARDIOLOGY CLINIC | Facility: CLINIC | Age: 35
End: 2020-11-09

## 2020-12-07 ENCOUNTER — TELEMEDICINE (OUTPATIENT)
Dept: FAMILY MEDICINE CLINIC | Facility: CLINIC | Age: 35
End: 2020-12-07
Payer: COMMERCIAL

## 2020-12-07 DIAGNOSIS — Z20.822 CLOSE EXPOSURE TO COVID-19 VIRUS: Primary | ICD-10-CM

## 2020-12-07 PROCEDURE — 99213 OFFICE O/P EST LOW 20 MIN: CPT | Performed by: FAMILY MEDICINE

## 2020-12-10 PROBLEM — Z20.822 CLOSE EXPOSURE TO COVID-19 VIRUS: Status: ACTIVE | Noted: 2020-12-10

## 2020-12-29 ENCOUNTER — TELEMEDICINE (OUTPATIENT)
Dept: FAMILY MEDICINE CLINIC | Facility: CLINIC | Age: 35
End: 2020-12-29
Payer: COMMERCIAL

## 2020-12-29 DIAGNOSIS — M79.7 FIBROMYALGIA: Primary | ICD-10-CM

## 2020-12-29 PROCEDURE — 99213 OFFICE O/P EST LOW 20 MIN: CPT | Performed by: FAMILY MEDICINE

## 2020-12-29 RX ORDER — DULOXETIN HYDROCHLORIDE 30 MG/1
30 CAPSULE, DELAYED RELEASE ORAL DAILY
Qty: 170 CAPSULE | Refills: 1 | Status: SHIPPED | OUTPATIENT
Start: 2020-12-29 | End: 2021-06-04 | Stop reason: ALTCHOICE

## 2021-01-13 ENCOUNTER — EVALUATION (OUTPATIENT)
Dept: PHYSICAL THERAPY | Facility: CLINIC | Age: 36
End: 2021-01-13
Payer: COMMERCIAL

## 2021-01-13 DIAGNOSIS — M79.7 FIBROMYALGIA: ICD-10-CM

## 2021-01-13 PROCEDURE — 97163 PT EVAL HIGH COMPLEX 45 MIN: CPT | Performed by: PHYSICAL THERAPIST

## 2021-01-13 NOTE — PROGRESS NOTES
PT Evaluation     Today's date: 2021  Patient name: Moreno Saleem  : 1985  MRN: [id number]  Referring provider: Mario Rudd MD  Dx:   Encounter Diagnosis     ICD-10-CM    1  Fibromyalgia  M79 7 Ambulatory referral to Physical Therapy                  Assessment  Assessment details: Pt is 38 y/o female recently diagnosed with fibromyalgia presenting for skilled PT evaluation  Symptoms have been ongoing since March, with chief complaint being neck pain/headaches and difficulty breathing  She has been cleared by cardiology work-up and PCP does not think pt will benefit from pulmonology consult, breathing difficulty likely due to anxiety  She does have hx of anxiety and depression, but denies ever having breathing difficulties in the past  Pain is localized to mainly neck and upper quarter  She presents with normal cervical and UE ROM  UE and LE strength grossly 5/5  She does present with hypertonicity t/o cervical musculature likely contributing to neck pain and headache  Also rounded shoulders and forward head posture  Neck disability index score is 38%  She also presents with increased use of accessory muscles of breathing rather than diaphragmatic breathing  Her 6 Minute walk test score is below age norms indicating decreased endurance overall, no issues with breathing or SpO2 or HR afterwards  Overall pt mostly sedentary and not working due to symptoms, may have components of fear avoidance behavior as well  No history of exercise - 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  Reviewed POC w/ pt with focus on trigger point release to reduce neck muscle tension, postural exercises and education, and diaphragmatic breathing training  Discussed possible benefits of meditation and yoga  Pt in agreement with overall POC  She will benefit from skilled PT to address above impairments and return to highest level of function possible  Impairments: abnormal muscle tone, lacks appropriate home exercise program, pain with function, poor posture  and poor body mechanics  Understanding of Dx/Px/POC: good   Prognosis: good    Goals  Short-term goals (4 weeks)  1  Patient will be independent in basic HEP  2  Patient will report 25% reduction in neck and headache pain levels  3  Patient will demonstrate appropriate diaphragmatic breathing with use of accessory muscles <25% of the time  Long-term goals (8-12 weeks)  1  Patient will score <10% on Neck Disability Index to resume household chores/daily routines  2  Patient will demonstrate appropriate diaphragmatic breathing with no observable over-use of accessory muscles  3  Patient will ambulate > 1,800 ft on 6 Minute Walk Test to meet age norm values and demonstrate increased cardiovascular endurance  4  Patient will be independent in exercise program of her choice (low impact programs ex: yoga) post discharge from PT   5  Patient will demonstrate normal muscle tone t/o cervical musculature  Plan  Patient would benefit from: skilled physical therapy  Planned modality interventions: biofeedback and TENS  Planned therapy interventions: balance, neuromuscular re-education, manual therapy, breathing training, body mechanics training, patient education, postural training, community reintegration, graded exercise, graded motor, home exercise program, graded activity, gait training, functional ROM exercises, flexibility, transfer training, therapeutic training, therapeutic exercise, therapeutic activities, stretching and strengthening  Frequency: 2x week  Duration in weeks: 12  Plan of Care beginning date: 1/13/2021  Plan of Care expiration date: 4/7/2021  Treatment plan discussed with: patient        Subjective Evaluation    History of Present Illness  Mechanism of injury: Pt reports having issues since March 2020, but officially diagnosed with fibromyalgia on Dec 29th   Initial symptoms were panic attack, sore muscles, feeling restless  Warm baths seemed to help  Current symptoms include tension in shoulders, neck, upper back area, and her chest  She does get headaches from this  Biggest problem is difficulty breathing properly due to "tension" in her chest - cleared by cardiology work-up, was given inhaler but not helpful  She also gets periods of lightheadedness where she feels like she is going to pass out, so she lays down and puts a cold towel on her head  She was prescribed cymbalta by Dr Florentino Lezama and has been taking for past 2 weeks  She has appt with psychiatrist in February  Quality of life: good    Pain  Current pain ratin  At best pain ratin  At worst pain rating: 10  Location: upper quarter   Quality: tension  Social Support  Steps to enter house: yes  Stairs in house: yes   Lives in: multiple-level home  Lives with: young children and spouse    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, 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 )  Patient Goals  Patient goals for therapy: improved balance, independence with ADLs/IADLs, return to sport/leisure activities, increased strength, decreased pain and increased motion  Patient goal: relaxation in upper half of body, reduce tension, figure out the breathing issue         Objective     Palpation   Left   Hypertonic in the cervical paraspinals, levator scapulae, rhomboids, scalenes, sternocleidomastoid, suboccipitals and upper trapezius  Right   Hypertonic in the cervical paraspinals, levator scapulae, rhomboids, scalenes, sternocleidomastoid, suboccipitals and upper trapezius       Active Range of Motion   Cervical/Thoracic Spine       Cervical    Flexion:  WFL  Extension:  WFL  Left lateral flexion:  WFL  Right lateral flexion:  WFL  Left rotation:  WF  Right rotation:  [facility]    Strength/Myotome Testing     Additional Strength Details  UE strength grossly 5/5 throughout   LE strength grossly 5/5 throughout    Functional Assessment        Comments  6 Minute Walk Test: 1,430 ft  SpO2 99%, HR 75 bpm   No increase in HA or symptoms       Flowsheet Rows      Most Recent Value   PT/OT G-Codes   Current Score  64   Projected Score  70             Precautions:   Past Medical History:   Diagnosis Date    Anxiety 6/5/2018    Asthma     Psychiatric disorder     Varicella            Manuals                                                                 Neuro Re-Ed                                                                                                        Ther Ex                                                                                                                     Ther Activity                                       Gait Training                                       Modalities

## 2021-01-14 ENCOUNTER — TRANSCRIBE ORDERS (OUTPATIENT)
Dept: PHYSICAL THERAPY | Facility: CLINIC | Age: 36
End: 2021-01-14

## 2021-01-14 DIAGNOSIS — M79.7 FIBROMYALGIA: Primary | ICD-10-CM

## 2021-01-15 ENCOUNTER — OFFICE VISIT (OUTPATIENT)
Dept: PHYSICAL THERAPY | Facility: CLINIC | Age: 36
End: 2021-01-15
Payer: COMMERCIAL

## 2021-01-15 DIAGNOSIS — M79.7 FIBROMYALGIA: Primary | ICD-10-CM

## 2021-01-15 NOTE — PROGRESS NOTES
Daily Note     Today's date: 1/15/2021  Patient name: Elayne Hamilton  : 1985  MRN: [id number]  Referring provider: Chadwcik Lowe MD  Dx:   Encounter Diagnosis     ICD-10-CM    1  Fibromyalgia  M79 7                   Subjective: Pt with no updates since initial evaluation  Objective: See treatment diary below    Manual (25 min):  TPR to B/L upper trap, levator, scalenes, suboccipital release ,SCM    TE (20 min):  - Diaphragmatic breathing with B/L D2 flexion  - Review of proper posture, given handout  - Levator stretch 30" x 3  - Upper trap stretch 30" x 3  - Pec stretch in doorway 30" x 3      Assessment: Pt tolerated treatment well  Performed manual therapy to cervical musculature for first part of session to reduce muscle tension and pain  Then introduced postural stretches and overall postural education-- pt given handouts  Also introduced B/L D2 flexion pattern for breathing re-education  Pt would benefit from continued PT to manage pain and dysfunction associated with fibromyalgia diagnosis  Plan: Continue per plan of care        Precautions:   Past Medical History:   Diagnosis Date    Anxiety 2018    Asthma     Psychiatric disorder     Varicella            Manuals                                                                 Neuro Re-Ed                                                                                                        Ther Ex                                                                                                                     Ther Activity                                       Gait Training                                       Modalities

## 2021-01-20 ENCOUNTER — OFFICE VISIT (OUTPATIENT)
Dept: PHYSICAL THERAPY | Facility: CLINIC | Age: 36
End: 2021-01-20
Payer: COMMERCIAL

## 2021-01-20 DIAGNOSIS — M79.7 FIBROMYALGIA: Primary | ICD-10-CM

## 2021-01-20 NOTE — PROGRESS NOTES
Daily Note     Today's date: 2021  Patient name: Wendi Acosta  : 1985  MRN: [id number]  Referring provider: Cydney Powers MD  Dx:   Encounter Diagnosis     ICD-10-CM    1  Fibromyalgia  M79 7                   Subjective: Pt denies soreness or pain after last visit  Reports breathing has been worse over past 2 days  Has psych appt scheduled for February  Objective: See treatment diary below    Manual (20 min):  TPR to B/L upper trap, levator, scalenes, suboccipital release ,SCM    TE (20 min):  - Levator stretch 30" x 3  - Upper trap stretch 30" x 3  - Pec stretch in doorway 30" x 3  - TB rows (green) 3 x 10   - TB shoulder ext (green) 3 x 10  - Cat/cow pose focus on timing with inhalation/exhalation      Assessment: Pt tolerated treatment well  Improved cervical muscle tone compared to last visit  Pt reports doing stretching @ home and was able to sit edge of bed to complete a puzzle without back/neck pain  Progressed to resistance band postural strengthening today  Pt would benefit from continued PT to manage pain and dysfunction associated with fibromyalgia diagnosis  Plan: Continue per plan of care  Progress w/ postural strengthening        Precautions:   Past Medical History:   Diagnosis Date    Anxiety 2018    Asthma     Psychiatric disorder     Varicella            Manuals                                                                 Neuro Re-Ed                                                                                                        Ther Ex                                                                                                                     Ther Activity                                       Gait Training                                       Modalities

## 2021-01-22 ENCOUNTER — OFFICE VISIT (OUTPATIENT)
Dept: PHYSICAL THERAPY | Facility: CLINIC | Age: 36
End: 2021-01-22
Payer: COMMERCIAL

## 2021-01-22 DIAGNOSIS — M79.7 FIBROMYALGIA: Primary | ICD-10-CM

## 2021-01-22 NOTE — PROGRESS NOTES
Daily Note     Today's date: 2021  Patient name: Rodney Roberts  : 1985  MRN: [id number]  Referring provider: Irene Banks MD  Dx:   Encounter Diagnosis     ICD-10-CM    1  Fibromyalgia  M79 7                   Subjective: Pt denies headaches, reports neck and shoulders are feeling better  Has psych appt scheduled for February  Objective: See treatment diary below    Manual (20 min):  TPR to B/L upper trap, levator, scalenes, suboccipital release ,SCM    TE (20 min):  - Levator stretch 30" x 3  - Upper trap stretch 30" x 3  - Pec stretch in doorway 30" x 3  - TB rows (green) 3 x 10   - TB shoulder ext (green) 3 x 10  - TB horizontal abduction (green) 3 x 10   - Chin tucks 3 x 10  - Scap retractions      Assessment: Pt tolerated treatment well with continued reports of improvements in symptoms  Added more TB strengthening exercises today and added to HEP  Pt would benefit from continued PT to manage pain and dysfunction associated with fibromyalgia diagnosis  Plan: Continue per plan of care  Progress w/ postural strengthening        Precautions:   Past Medical History:   Diagnosis Date    Anxiety 2018    Asthma     Psychiatric disorder     Varicella            Manuals                                                                 Neuro Re-Ed                                                                                                        Ther Ex                                                                                                                     Ther Activity                                       Gait Training                                       Modalities

## 2021-01-27 ENCOUNTER — OFFICE VISIT (OUTPATIENT)
Dept: PHYSICAL THERAPY | Facility: CLINIC | Age: 36
End: 2021-01-27
Payer: COMMERCIAL

## 2021-01-27 DIAGNOSIS — M79.7 FIBROMYALGIA: Primary | ICD-10-CM

## 2021-01-27 NOTE — PROGRESS NOTES
Daily Note     Today's date: 2021  Patient name: Bin Chin  : 1985  MRN: [id number]  Referring provider: Anum Anders MD  Dx:   Encounter Diagnosis     ICD-10-CM    1  Fibromyalgia  M79 7                   Subjective: Pt reports still feeling good  No complaints right now  Has psych appt scheduled for February  Objective: See treatment diary below    Manual (20 min):  TPR to B/L upper trap, levator, scalenes, suboccipital release ,SCM    TE (20 min):  - TB rows (green) 3 x 10   - TB shoulder ext (green) 3 x 10  - TB horizontal abduction (green) 3 x 10   - Chin tucks 3 x 10  - TB serratus wall clocks (yellow) 2 x 10 cycles  - TB I's, T's, Y's (green) 2 x 10  - Cat/cow stretches in quadruped 2 x 10      Assessment: Pt tolerated treatment well with continued reports of improvements in symptoms  Progressing well with TB exercises although reports feeling sore in triceps after serratus clocks and not through scapular musculature  Continue to work on isolating scapular musculature  Pt would benefit from continued PT to manage pain and dysfunction associated with fibromyalgia diagnosis  Plan: Continue per plan of care  Progress w/ postural strengthening        Precautions:   Past Medical History:   Diagnosis Date    Anxiety 2018    Asthma     Psychiatric disorder     Varicella            Manuals                                                                 Neuro Re-Ed                                                                                                        Ther Ex                                                                                                                     Ther Activity                                       Gait Training                                       Modalities

## 2021-01-29 ENCOUNTER — OFFICE VISIT (OUTPATIENT)
Dept: PHYSICAL THERAPY | Facility: CLINIC | Age: 36
End: 2021-01-29
Payer: COMMERCIAL

## 2021-01-29 DIAGNOSIS — M79.7 FIBROMYALGIA: Primary | ICD-10-CM

## 2021-01-29 NOTE — PROGRESS NOTES
Daily Note     Today's date: 2021  Patient name: Rigoberto Trinh  : 1985  MRN: [id number]  Referring provider: Estuardo Aguilar MD  Dx:   Encounter Diagnosis     ICD-10-CM    1  Fibromyalgia  M79 7                   Subjective: Pt reports still feeling good  No complaints right now  Has psych appt scheduled for February  Objective: See treatment diary below    Manual (15 min):  TPR to B/L upper trap, levator, scalenes, suboccipital release ,SCM    TE (25 min):  - TB rows (green) 3 x 10   - TB shoulder ext (green) 3 x 10  - TB horizontal abduction (green) 3 x 10   - Chin tucks 3 x 10  - TB serratus wall clocks (yellow) 2 x 10 cycles  - Plank bosu taps M-L  10x  - Prone I's, T's, Y's (2# weights) 10 reps each   - Cat/cow stretches in quadruped 2 x 10      Assessment: Pt tolerated treatment well with continued reports of improvements in symptoms  Progressed to more scapular stability exercises such as Bosu planks and added in prone I's,T's,Y's to further isolate scapular musculature  Pt would benefit from continued PT to manage pain and dysfunction associated with fibromyalgia diagnosis  Plan: Continue per plan of care  Progress w/ postural strengthening  Re-assess next week for potential D/C         Precautions:   Past Medical History:   Diagnosis Date    Anxiety 2018    Asthma     Psychiatric disorder     Varicella            Manuals                                                                 Neuro Re-Ed                                                                                                        Ther Ex                                                                                                                     Ther Activity                                       Gait Training                                       Modalities

## 2021-02-03 ENCOUNTER — OFFICE VISIT (OUTPATIENT)
Dept: PHYSICAL THERAPY | Facility: CLINIC | Age: 36
End: 2021-02-03
Payer: COMMERCIAL

## 2021-02-03 DIAGNOSIS — M79.7 FIBROMYALGIA: Primary | ICD-10-CM

## 2021-02-03 PROCEDURE — 97140 MANUAL THERAPY 1/> REGIONS: CPT | Performed by: PHYSICAL THERAPIST

## 2021-02-03 PROCEDURE — 97110 THERAPEUTIC EXERCISES: CPT | Performed by: PHYSICAL THERAPIST

## 2021-02-03 NOTE — PROGRESS NOTES
Daily Note     Today's date: 2/3/2021  Patient name: Zaria Hernandez  : 1985  MRN: [id number]  Referring provider: Josie Dawn MD  Dx:   Encounter Diagnosis     ICD-10-CM    1  Fibromyalgia  M79 7                   Subjective: Pt reports no new changes since Friday, still feeling good  Denies soreness from previous visit  Has psych appt scheduled for February  Objective: See treatment diary below    Manual (15 min):  TPR to B/L upper trap, levator, scalenes, suboccipital release,SCM    TE (25 min):  - TB rows (green) 3 x 10   - TB shoulder ext (green) 3 x 10  - TB horizontal abduction (green) 3 x 10   - Chin tucks 3 x 10  - TB serratus wall clocks (yellow) 2 x 10 cycles  - Plank bosu taps M-L  2 sets 10  - Prone I's, T's, Y's on PB (2# weights) 2 sets 10   - Bird dogs 10 reps  - Cat/cow stretches in quadruped 2 x 10    Assessment: Pt tolerated treatment well with continued reports of improvements in symptoms  Increased reps for bosu taps and prone I'sT'sY's, and added in bird dogs for scap strengthening/core stability  Overall she feels her neck pain/tension has improved as well as her breathing  Discussed plan for D/C Friday with continuation of HEP and pt in agreement  Pt would benefit from continued PT to manage pain and dysfunction associated with fibromyalgia diagnosis  Plan: Continue per plan of care  Progress w/ postural strengthening  Re-assess Friday for potential D/C         Precautions:   Past Medical History:   Diagnosis Date    Anxiety 2018    Asthma     Psychiatric disorder     Varicella

## 2021-02-05 ENCOUNTER — OFFICE VISIT (OUTPATIENT)
Dept: PHYSICAL THERAPY | Facility: CLINIC | Age: 36
End: 2021-02-05
Payer: COMMERCIAL

## 2021-02-05 DIAGNOSIS — M79.7 FIBROMYALGIA: Primary | ICD-10-CM

## 2021-02-05 PROCEDURE — 97530 THERAPEUTIC ACTIVITIES: CPT | Performed by: PHYSICAL THERAPIST

## 2021-02-05 PROCEDURE — 97110 THERAPEUTIC EXERCISES: CPT | Performed by: PHYSICAL THERAPIST

## 2021-02-05 NOTE — PROGRESS NOTES
PT Discharge     Today's date: 2021  Patient name: Zaria Hernandez  : 1985  MRN: [id number]  Referring provider: Josie Dawn MD  Dx:   Encounter Diagnosis     ICD-10-CM    1  Fibromyalgia  M79 7                   Subjective: Pt reports she feels like she improved a lot, feels 80% improvement in her symptoms  She also notices her breathing feels improved, hasn't noticed as much difficulty with it  Reports she was sore after last session  Objective: See treatment diary below    Neck Disability Index: 10% (38% on initial eval)  6 Minute Walk Test: 1,500 ft (1,430 ft on initial eval)      SpO2 99%, HR 77 bpm    TE (25 min):  - TB rows (green) 3 x 10   - TB shoulder ext (green) 3 x 10  - TB horizontal abduction (green) 3 x 10   - Chin tucks 3 x 10  - TB serratus wall clocks (red) 2 x 10 cycles  - Plank bosu taps M-L  2 sets 10  - Prone I's, T's, Y's on PB (2# weights) 2 sets 10  - Bird dogs 2 sets 10 reps  - Cat/cow stretches in quadruped 2 x 10    Assessment: Pt demonstrates significant improvements since initial eval  She has met all STG's and 3 of 5 LTG's  She feels 80% improvement since initial eval and is in agreement with D/C today  Reviewed all exercises above for HEP and given black TB to progress exercises at home  D/C today  Short-term goals (4 weeks)  1  Patient will be independent in basic HEP  -MET  2  Patient will report 25% reduction in neck and headache pain levels  -MET  3  Patient will demonstrate appropriate diaphragmatic breathing with use of accessory muscles <25% of the time  -MET    Long-term goals (8-12 weeks)  1  Patient will score <10% on Neck Disability Index to resume household chores/daily routines  - MET (10%)  2  Patient will demonstrate appropriate diaphragmatic breathing with no observable over-use of accessory muscles  - MET  3  Patient will ambulate > 1,800 ft on 6 Minute Walk Test to meet age norm values and demonstrate increased cardiovascular endurance  NOT MET  4  Patient will be independent in exercise program of her choice (low impact programs ex: yoga) post discharge from PT - NOT MET (pt has initiated but not independent yet)  5  Patient will demonstrate normal muscle tone t/o cervical musculature  - MET      Plan: Discharge  Continue with HEP       Precautions:   Past Medical History:   Diagnosis Date    Anxiety 6/5/2018    Asthma     Psychiatric disorder     Varicella

## 2021-02-09 ENCOUNTER — SOCIAL WORK (OUTPATIENT)
Dept: BEHAVIORAL/MENTAL HEALTH CLINIC | Facility: CLINIC | Age: 36
End: 2021-02-09
Payer: COMMERCIAL

## 2021-02-09 DIAGNOSIS — F34.1 DYSTHYMIC DISORDER: Primary | ICD-10-CM

## 2021-02-09 DIAGNOSIS — F41.1 GENERALIZED ANXIETY DISORDER: ICD-10-CM

## 2021-02-09 PROCEDURE — 90834 PSYTX W PT 45 MINUTES: CPT | Performed by: SOCIAL WORKER

## 2021-02-10 NOTE — PSYCH
This note was not shared with the patient due to this is a psychotherapy note    Assessment/Plan:     F34 1 Dysthymic disorder  F41 1 Generalized anxiety disorder  Subjective:    Patient ID: Anmol Little is a 37 y o  female who presented for a follow-up outpatient individual counseling session after an approximate service gap of 9 months  Manny claims that the gap in counseling services was due to the undersigned therapist's booked schedule and the office cancelling one of her previously-scheduled sessions  Prior to today's session, Manny had an office visit with her primary care physician on December 29, 2021  A review of the record revealed that Manny has a past history of anxiety and 10 months of dysphoria and anxiety  She presented with overall muscle tightness, fatigue and difficulty sleeping  Manny was last seen 6 months ago  She was started on Zoloft for dysphoria and anxiety but did not continue the medications after 2 to 3 weeks  She stopped the medication because it made her feel lightheaded  Manny was seen in the office again a few months ago and was given a referral to cardiology  A cardiac stress test was done which was negative  She had a low vitamin D level  Manny was tearful and anxious on the phone  Manny was referred to psychiatry but her appointment was cancelled  At this time, she most likely has fibromyalgia given her symptoms of anxiety, dysphoria, generalized muscle aches and fatigue  Manny was started on Cymbalta 30 MG for 7 days and then increase to 60 MG  It was recommended that she go to PT and start counseling  For today's session with the undersigned therapist, Manny reports that she feels very anxious since last night  She complains that she is feeling overwhelmed with taking care of her children  Manny rates her depression level as a "5" on a depression severity scale of "1 to 10"    Manny started physical therapy in 2021 and it just ended  Her physical problems started in 2020  Her primary care physician assessed that her physical issues were due to anxiety  Her primary care physician recommended PT to relieve the stress in her body  Manny was diagnosed with fibromyalgia via virtual office visit and she appears apprehensive about that diagnosis  She was previously prescribed Zoloft but had episodes of vomiting and passing out (last time in 2020)  Manny just ran out of her Cymbalta prescription after taking it for 60 days  She feels no symptom relief except for relieved some tension in her neck and shoulders which may be primarily due to physical therapy  Her mother  in   Manny started to cry when discussing how she blamed herself for her mother's death (accidental overdose) due to their constant fighting  Due to [address], 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  She now needs to stay home with her kids to help them with virtual learning  Manny is currently collecting unemployment benefits  The undersigned therapist encouraged Manny to contact her primary care physician as soon as possible with request for a refill of Cymbalta  HPI:  The undersigned therapist provided Manny with 45 minutes of psychotherapy  Review of Systems      Objective:  Manny presented for today's session with a cooperative attitude and was easily engaged in the therapeutic process  Her mood was anxious and depressed with an affect that was congruent to topic  There was no evidence of a thought disorder or psychosis  She denied suicidal ideation, gesture or plan  Manny has felt anxious, overwhelmed, stressed and depressed throughout the week       Physical Exam

## 2021-02-17 ENCOUNTER — TELEMEDICINE (OUTPATIENT)
Dept: FAMILY MEDICINE CLINIC | Facility: CLINIC | Age: 36
End: 2021-02-17
Payer: COMMERCIAL

## 2021-02-17 DIAGNOSIS — M79.7 FIBROMYALGIA: Primary | ICD-10-CM

## 2021-02-17 PROCEDURE — 99213 OFFICE O/P EST LOW 20 MIN: CPT | Performed by: FAMILY MEDICINE

## 2021-02-17 PROCEDURE — 1036F TOBACCO NON-USER: CPT | Performed by: FAMILY MEDICINE

## 2021-02-17 RX ORDER — DULOXETIN HYDROCHLORIDE 60 MG/1
60 CAPSULE, DELAYED RELEASE ORAL DAILY
Qty: 90 CAPSULE | Refills: 3 | Status: SHIPPED | OUTPATIENT
Start: 2021-02-17 | End: 2022-01-11

## 2021-02-17 NOTE — PROGRESS NOTES
Virtual Brief Visit    Assessment/Plan:    Problem List Items Addressed This Visit        Other    Fibromyalgia - Primary    Relevant Medications    DULoxetine (CYMBALTA) 60 mg delayed release capsule            Manny has done a great job of following up with all recommended interventions  She is feeling very well today after completing PT  She will continue to take Cymbalta daily and fu with Dr Douglas Luis  PT is available to her if she would like to continue to work with them  Follow up in 3 months or prn  Encounter provider Harpreet Mendoza MD    Provider located at [address] [place] [id number]    Recent Visits  No visits were found meeting these conditions  Showing recent visits within past 7 days and meeting all other requirements     Future Appointments  No visits were found meeting these conditions  Showing future appointments within next 150 days and meeting all other requirements        After connecting through telephone, the patient was identified by name and date of birth  Bin Chin was informed that this is a telemedicine visit and that the visit is being conducted through telephone  My office door was closed  No one else was in the room  She acknowledged consent and understanding of privacy and security of the platform  The patient has agreed to participate and understands she can discontinue the visit at any time  Patient is aware this is a billable service  All recent pertinent lab work and imagining was reviewed on today's visit with patient, and appropriate follow up was initiated if needed  Plan and goals of care for this visit were discussed in detail with patient  The patient verbalized agreement and understanding of the plan, and all questions and concerns were answered  I advised the patient to contact me or the office with any concerns or questions    In the event of an emergency, the patient was advised to call our office and/or or go to the ED if the patient was unable to reach a provider  Subjective    Nikita Bronson is a 37 y o  female with a history of fibromyagia who was seen at the end of December and started on a regimen of PT, cymbalta, and psychotherapy with Iris Kee  She has completed all PT sessions and has been seen by Dr Vaishali Hicks  She did run out of her cymbalta more than one week ago but states that she has been otherwise compliant and feels very well today  She would like to continue this medication  She does state that she typically used to take melatonin but stopped taking it because she was not sure if it would interact with the medication  She has had difficulty with falling asleep since stopping her melatonin  Past Medical History:   Diagnosis Date    Anxiety 6/5/2018    Asthma     Psychiatric disorder     Varicella        Past Surgical History:   Procedure Laterality Date    CHOLECYSTECTOMY  2008    CONDYLOMA EXCISION/FULGURATION      SALPINGECTOMY Bilateral 7/3/2017    Procedure: SALPINGECTOMY;  Surgeon: Devora Allison MD;  Location: [address] [place] [address];  Service: Gynecology    TUBAL LIGATION         Current Outpatient Medications   Medication Sig Dispense Refill    DULoxetine (CYMBALTA) 30 mg delayed release capsule Take 1 capsule (30 mg total) by mouth daily Take one pill per day for 7 days, then two pills per day  170 capsule 1    DULoxetine (CYMBALTA) 60 mg delayed release capsule Take 1 capsule (60 mg total) by mouth daily 90 capsule 3    sertraline (ZOLOFT) 25 mg tablet Take 1 tablet (25 mg total) by mouth daily (Patient not taking: Reported on 10/1/2020) 30 tablet 5     No current facility-administered medications for this visit  Allergies   Allergen Reactions    Pollen Extract        Review of Systems    There were no vitals filed for this visit        I spent 20 minutes directly with the patient during this visit    VIRTUAL VISIT [address] acknowledges that she has consented to an online visit or consultation  She understands that the online visit is based solely on information provided by her, and that, in the absence of a face-to-face physical evaluation by the physician, the diagnosis she receives is both limited and provisional in terms of accuracy and completeness  This is not intended to replace a full medical face-to-face evaluation by the physician  Marylen Favre understands and accepts these terms  --  Gisela Torres MD    "This note has been constructed using a voice recognition system  Therefore there may be syntax, spelling, and/or grammatical errors  Please call if you have any questions   All questions and concerns were addressed and answered by myself "

## 2021-03-02 ENCOUNTER — SOCIAL WORK (OUTPATIENT)
Dept: BEHAVIORAL/MENTAL HEALTH CLINIC | Facility: CLINIC | Age: 36
End: 2021-03-02
Payer: COMMERCIAL

## 2021-03-02 DIAGNOSIS — F41.1 GENERALIZED ANXIETY DISORDER: ICD-10-CM

## 2021-03-02 DIAGNOSIS — F34.1 DYSTHYMIC DISORDER: Primary | ICD-10-CM

## 2021-03-02 PROCEDURE — 90834 PSYTX W PT 45 MINUTES: CPT | Performed by: SOCIAL WORKER

## 2021-03-02 NOTE — PSYCH
This note was not shared with the patient due to this is a psychotherapy note    Assessment/Plan:     F34 1  Dysthymic disorder  F41 1 Generalized anxiety disorder  Subjective:    Patient ID: Em Flores is a 37 y o  female who presented for a follow-up outpatient individual counseling session after an approximate 3-week service gap  Prior to today's session, [place] had her most recent office visit with her primary care physician on February 17, 2021 for treatment of fibromyalgia  A review of the medical record revealed that [place] has done a great job following up with all recommended interventions  She is feeling very well today after completing PT  Manny will continue to take Cymbalta and counseling with Dr Gustavo Birmingham  She has difficulty falling asleep since she stopped her melatonin  For today's session with the undersigned therapist Manny denies depression but experiences anxiety every day  She's been taking Cymbalta since the end of December 2020 but was out of medication for two weeks in February 2021  Manny restarted Cymbalta on February 17, 2020  She complains about her breathing  Manny is unsure if the medication is helping with her mood  She thinks that Cymbalta is helping to reduce her muscle tension and claims that it was prescribed for her fibromyalgia  Manny is struggling to cope with stressors secondary to her conflicted relationship with her paramour  They have been together for 6 years  Manny reports that most of their arguments are about sex  He accuses her of not making him feel good about himself  Manny complains about lack of sex drive specifically due to history of conflict with her paramour  HPI:  The undersigned therapist provided Manny with 45 minutes of psychotherapy  Review of Systems      Objective:  Manny presented for today's session with a cooperative attitude and was easily engaged in the therapeutic process  Her mood was neutral with an affect that was congruent to topic  There was no evidence of a thought disorder or psychosis  She denied suicidal ideation, gesture or plan  [facility] denied depressed mood but feels anxious every day       Physical Exam

## 2021-03-09 ENCOUNTER — SOCIAL WORK (OUTPATIENT)
Dept: BEHAVIORAL/MENTAL HEALTH CLINIC | Facility: CLINIC | Age: 36
End: 2021-03-09
Payer: COMMERCIAL

## 2021-03-09 DIAGNOSIS — F41.1 GENERALIZED ANXIETY DISORDER: ICD-10-CM

## 2021-03-09 DIAGNOSIS — F34.1 DYSTHYMIC DISORDER: Primary | ICD-10-CM

## 2021-03-09 PROCEDURE — 90834 PSYTX W PT 45 MINUTES: CPT | Performed by: SOCIAL WORKER

## 2021-03-09 NOTE — PSYCH
This note was not shared with the patient due to this is a psychotherapy note    Assessment/Plan:     F34 1  Dysthymic disorder  F41 1 Generalized anxiety disorder  Subjective:    Patient ID: Elayne Hamilton is a 37 y o  female who presented for a follow-up outpatient individual counseling session  [place] denies depression and anxiety  She feels tired  Manny had an episode of vomiting on Wednesday after "passing out"  [place] claims that she has had 12 similar episodes over the course of the  last 6 months in which her body shuts down, arms go numb, tingling in her extremities, lightheadedness and vomiting  She has reported the aforementioned symptoms to her primary care physician on several occasions and was told that her symptoms were more than likely caused by anxiety  These episodes mostly occur in the morning  Manny is excited and looking forward to spending a night at a hotel with her paramour over the weekend  The undersigned therapist discussed how to handle her paramour's demands for sex  The undersigned therapist provided [place] with psychoeducation regarding neurotic behavior  HPI:  The undersigned therapist provided [facility] with 45 minutes of psychotherapy  Review of Systems      Objective:  Manny presented for today's session with a cooperative attitude and was easily engaged in the therapeutic process  Her mood was tired with an affect that was congruent to topic  There was no evidence of a thought disorder or psychosis  [place] denied suicidal ideation, gesture or plan  [place] denied depression, anxiety or difficulty managing her mood       Physical Exam

## 2021-03-16 ENCOUNTER — SOCIAL WORK (OUTPATIENT)
Dept: BEHAVIORAL/MENTAL HEALTH CLINIC | Facility: CLINIC | Age: 36
End: 2021-03-16
Payer: COMMERCIAL

## 2021-03-16 DIAGNOSIS — F34.1 DYSTHYMIC DISORDER: Primary | ICD-10-CM

## 2021-03-16 DIAGNOSIS — F41.1 GENERALIZED ANXIETY DISORDER: ICD-10-CM

## 2021-03-16 PROCEDURE — 90834 PSYTX W PT 45 MINUTES: CPT | Performed by: SOCIAL WORKER

## 2021-03-16 NOTE — PSYCH
This note was not shared with the patient due to this is a psychotherapy note    Assessment/Plan:     F34 1 Dysthymic disorder  F41 1 Generalized anxiety disorder  Subjective:    Patient ID: Yessi Nam is a 37 y o  female who presented for follow-up outpatient individual counseling session  She denies depression, sadness or anxiety  The undersigned therapist assisted Manny process her feelings about her boyfriend's estranged relationship with his mother  She has been providing emotional support to her boyfriend since he reconciled with his mother after finding out that she was in the hospital    [place] describes her boyfriend as being confused, overwhelmed and guilty  The undersigned therapist assisted [place] explore her feelings about blended family/co-parenting conflicts with her boyfriend  [place] continues to complain about her difficulty breathing  According to Manny, her physicians have been dismissive of medical causes of her breathing issues and instead have attributed it to her difficulty managing her anxiety  HPI:  The undersigned therapist provided [place] with 45 minutes of psychotherapy  Review of Systems      Objective:  [place] presented for today's session with a cooperative attitude and was easily engaged in the therapeutic process  Her mood was depressed with an affect that was congruent to topic  There was no evidence of a thought disorder or psychosis  She denied suicidal ideation, gesture or plan  [place] denied depression, sadness and anxiety         Physical Exam

## 2021-03-22 ENCOUNTER — SOCIAL WORK (OUTPATIENT)
Dept: BEHAVIORAL/MENTAL HEALTH CLINIC | Facility: CLINIC | Age: 36
End: 2021-03-22
Payer: COMMERCIAL

## 2021-03-22 DIAGNOSIS — F34.1 DYSTHYMIC DISORDER: Primary | ICD-10-CM

## 2021-03-22 PROCEDURE — 90834 PSYTX W PT 45 MINUTES: CPT | Performed by: SOCIAL WORKER

## 2021-04-19 ENCOUNTER — TELEMEDICINE (OUTPATIENT)
Dept: FAMILY MEDICINE CLINIC | Facility: CLINIC | Age: 36
End: 2021-04-19
Payer: COMMERCIAL

## 2021-04-19 DIAGNOSIS — M79.7 FIBROMYALGIA: Primary | ICD-10-CM

## 2021-04-19 PROCEDURE — 99213 OFFICE O/P EST LOW 20 MIN: CPT | Performed by: FAMILY MEDICINE

## 2021-04-19 NOTE — PROGRESS NOTES
Assessment/Plan:      Diagnoses and all orders for this visit:    Fibromyalgia        Advised patient to continue with dosage of Cymbalta and physical therapy as it was beneficial to her in the past   Follow-up in 4-6 weeks for follow-up of fibromyalgia and annual physical exam     Subjective:     Patient ID: Roselyn López is a 37 y o  female   With a past medical history of fibromyalgia who is being seen today for follow-up visit  Currently patient has Cymbalta 60 mg daily and is doing well this medication  She does currently complain of some muscle tightness in her shoulders and chest    She found relief for in the symptoms when she went to physical therapy in the past and would like to continue to see them in the future  She has follow-up appointment with Cata Alvarez in 1 month's time  Review of Systems   All other systems reviewed and are negative          Objective:  Virtual appointment - no vitals taken or physical exam

## 2021-04-19 NOTE — PROGRESS NOTES
Virtual Brief Visit    Assessment/Plan:    Problem List Items Addressed This Visit        Other    Fibromyalgia - Primary          Advised patient to continue with dosage of Cymbalta and physical therapy as it was beneficial to her in the past   Follow-up in 4-6 weeks for follow-up of fibromyalgia and annual physical exam           Encounter provider Jenell Sandhoff, MD    Provider located at [address] [place] [address]  [id number]    Recent Visits  No visits were found meeting these conditions  Showing recent visits within past 7 days and meeting all other requirements     Today's Visits  Date Type Provider Dept   04/19/21 [facility]   Showing today's visits and meeting all other requirements     Future Appointments  No visits were found meeting these conditions  Showing future appointments within next 150 days and meeting all other requirements        After connecting through telephone, the patient was identified by name and date of birth  Dheeraj Ross was informed that this is a telemedicine visit and that the visit is being conducted through telephone  My office door was closed  No one else was in the room  She acknowledged consent and understanding of privacy and security of the platform  The patient has agreed to participate and understands she can discontinue the visit at any time  Patient is aware this is a billable service  Subjective    Patient ID: Dheeraj Ross is a 37 y o  female   With a past medical history of fibromyalgia who is being seen today for follow-up visit  Currently patient has Cymbalta 60 mg daily and is doing well this medication  She does currently complain of some muscle tightness in her shoulders and chest    She found relief for in the symptoms when she went to physical therapy in the past and would like to continue to see them in the future      She has follow-up appointment with Antonieta Sanchez in 1 month's time      Review of Systems   All other systems reviewed and are negative  Past Medical History:   Diagnosis Date    Anxiety 6/5/2018    Asthma     Psychiatric disorder     Varicella        Past Surgical History:   Procedure Laterality Date    CHOLECYSTECTOMY  2008    CONDYLOMA EXCISION/FULGURATION      SALPINGECTOMY Bilateral 7/3/2017    Procedure: SALPINGECTOMY;  Surgeon: Dirk Meade MD;  Location: [address] [place] [address];  Service: Gynecology    TUBAL LIGATION         Current Outpatient Medications   Medication Sig Dispense Refill    DULoxetine (CYMBALTA) 30 mg delayed release capsule Take 1 capsule (30 mg total) by mouth daily Take one pill per day for 7 days, then two pills per day  170 capsule 1    DULoxetine (CYMBALTA) 60 mg delayed release capsule Take 1 capsule (60 mg total) by mouth daily 90 capsule 3    sertraline (ZOLOFT) 25 mg tablet Take 1 tablet (25 mg total) by mouth daily (Patient not taking: Reported on 10/1/2020) 30 tablet 5     No current facility-administered medications for this visit  Allergies   Allergen Reactions    Pollen Extract        Review of Systems    There were no vitals filed for this visit  I spent 25 minutes directly with the patient during this visit    [address] [place] [address] acknowledges that she has consented to an online visit or consultation  She understands that the online visit is based solely on information provided by her, and that, in the absence of a face-to-face physical evaluation by the physician, the diagnosis she receives is both limited and provisional in terms of accuracy and completeness  This is not intended to replace a full medical face-to-face evaluation by the physician  Gibran Berrios understands and accepts these terms

## 2021-04-27 ENCOUNTER — TELEPHONE (OUTPATIENT)
Dept: FAMILY MEDICINE CLINIC | Facility: CLINIC | Age: 36
End: 2021-04-27

## 2021-05-25 ENCOUNTER — SOCIAL WORK (OUTPATIENT)
Dept: BEHAVIORAL/MENTAL HEALTH CLINIC | Facility: CLINIC | Age: 36
End: 2021-05-25
Payer: COMMERCIAL

## 2021-05-25 DIAGNOSIS — F34.1 DYSTHYMIC DISORDER: Primary | ICD-10-CM

## 2021-05-25 PROCEDURE — 90834 PSYTX W PT 45 MINUTES: CPT | Performed by: SOCIAL WORKER

## 2021-05-25 NOTE — PSYCH
This note was not shared with the patient due to this is a psychotherapy note    Assessment/Plan:     F34 1  Dysthymic disorder  Subjective:    Patient ID: Knute Holter is a 37 y o  female who presented for a follow-up outpatient individual counseling session after an approximate 8-week service gap  Prior to today's session, on April 19, 2021, Manny had an office visit with her primary care physician for follow-up fibromyalgia treatment  A review of Maribel's medical record revealed that she was advised to continue with dosage of Cymbalta and physical therapy as it was beneficial to her in the past    She complains of some muscle tightness in her shoulders and chest    She found relief for symptoms after she went to physical therapy  For today's session, Manny denies depression and anxiety  She reports having 3 panic attacks/passing out but was still conscious  Manny attempted to mapped and tracked her anxiety symptoms found draining as the trigger or cause  She's been questioning her relationship with her paramour  HPI:  The undersigned therapist provided Manny with 45 minutes of psychotherapy  Review of Systems      Objective:  Manny presented for today's session with a cooperative attitude and was easily engaged in the therapeutic process  Her mood was neutral with an affect that was congruent to topic  There was no evidence of a thought disorder or psychosis  She denied suicidal ideation, gesture or plan  Manny denied depression, anxiety or difficulty managing her mood       Physical Exam

## 2021-06-03 ENCOUNTER — SOCIAL WORK (OUTPATIENT)
Dept: BEHAVIORAL/MENTAL HEALTH CLINIC | Facility: CLINIC | Age: 36
End: 2021-06-03
Payer: COMMERCIAL

## 2021-06-03 DIAGNOSIS — F34.1 DYSTHYMIC DISORDER: Primary | ICD-10-CM

## 2021-06-03 PROCEDURE — 90834 PSYTX W PT 45 MINUTES: CPT | Performed by: SOCIAL WORKER

## 2021-06-03 NOTE — PSYCH
This note was not shared with the patient due to this is a psychotherapy note    Assessment/Plan:     F34 1 Dysthymic disorder  Subjective:    Patient ID: Emile Pimentel is a 37 y o  female who presented for a follow-up outpatient individual counseling session  She reports less conflict with her paramour since he returned to work  Manny is excited about the possibility of returning to work also  She works in a kitchen  Manny denies depression, anxiety or difficulty managing her mood  She continues to complain of breathing issues and will follow-up with appropriate medical personnel for a second opinion  The undersigned therapist assisted Manny process her feelings about her estranged relationship with her biological father  The undersigned therapist provided Manny with supportive counseling and encouragement secondary to returning to work  HPI:  The undersigned therapist provided Manny with 45 minutes of psychotherapy  Review of Systems      Objective:  Manny presented for today's session with a cooperative attitude and was easily engaged in the therapeutic process  Her mood was neutral with an affect that was congruent to topic  There was no evidence of a thought disorder or psychosis  She denied suicidal ideation, gesture or plan  Manny denied depression anxiety or difficulty managing her mood       Physical Exam

## 2021-06-04 ENCOUNTER — OFFICE VISIT (OUTPATIENT)
Dept: FAMILY MEDICINE CLINIC | Facility: CLINIC | Age: 36
End: 2021-06-04
Payer: COMMERCIAL

## 2021-06-04 VITALS
RESPIRATION RATE: 16 BRPM | HEART RATE: 98 BPM | SYSTOLIC BLOOD PRESSURE: 120 MMHG | TEMPERATURE: 98 F | WEIGHT: 128.9 LBS | BODY MASS INDEX: 23.72 KG/M2 | OXYGEN SATURATION: 98 % | HEIGHT: 62 IN | DIASTOLIC BLOOD PRESSURE: 80 MMHG

## 2021-06-04 DIAGNOSIS — Z00.00 ANNUAL PHYSICAL EXAM: ICD-10-CM

## 2021-06-04 DIAGNOSIS — R68.81 EARLY SATIETY: Primary | ICD-10-CM

## 2021-06-04 DIAGNOSIS — R14.0 BLOATING SYMPTOM: ICD-10-CM

## 2021-06-04 PROCEDURE — 3725F SCREEN DEPRESSION PERFORMED: CPT | Performed by: FAMILY MEDICINE

## 2021-06-04 PROCEDURE — 3008F BODY MASS INDEX DOCD: CPT | Performed by: FAMILY MEDICINE

## 2021-06-04 PROCEDURE — 99213 OFFICE O/P EST LOW 20 MIN: CPT | Performed by: FAMILY MEDICINE

## 2021-06-04 RX ORDER — SIMETHICONE 80 MG
80 TABLET,CHEWABLE ORAL EVERY 6 HOURS PRN
Qty: 30 TABLET | Refills: 0 | Status: SHIPPED | OUTPATIENT
Start: 2021-06-04 | End: 2022-06-17

## 2021-06-04 NOTE — PATIENT INSTRUCTIONS
Beano for beans and gassy veggies  Apple cider vinegar, American [place] Bitters, papain, bromelain (e g  [employer]) prior to meals for bloating  Topical magnesium cream 'Natural Calm' for abdomen  Eat tablespoon of fennel, ofelia  Fennel boiled in bone broth settles stomach      Wellness Visit for Adults   AMBULATORY CARE:   A wellness visit  is when you see your healthcare provider to get screened for health problems  Your healthcare provider will also give you advice on how to stay healthy  Write down your questions so you remember to ask them  Ask your healthcare provider how often you should have a wellness visit  What happens at a wellness visit:  Your healthcare provider will ask about your health, and your family history of health problems  This includes high blood pressure, heart disease, and cancer  He or she will ask if you have symptoms that concern you, if you smoke, and about your mood  You may also be asked about your intake of medicines, supplements, food, and alcohol  Any of the following may be done:  · Your weight  will be checked  Your height may also be checked so your body mass index (BMI) can be calculated  Your BMI shows if you are at a healthy weight  · Your blood pressure  and heart rate will be checked  Your temperature may also be checked  · Blood and urine tests  may be done  Blood tests may be done to check your cholesterol levels  Abnormal cholesterol levels increase your risk for heart disease and stroke  You may also need a blood or urine test to check for diabetes if you are at increased risk  Urine tests may be done to look for signs of an infection or kidney disease  · A physical exam  includes checking your heartbeat and lungs with a stethoscope  Your healthcare provider may also check your skin to look for sun damage  · Screening tests  may be recommended  A screening test is done to check for diseases that may not cause symptoms   The screening tests you may need depend on your age, gender, family history, and lifestyle habits  For example, colorectal screening may be recommended if you are 48years old or older  Screening tests you need if you are a woman:   · A Pap smear  is used to screen for cervical cancer  Pap smears are usually done every 3 to 5 years depending on your age  You may need them more often if you have had abnormal Pap smear test results in the past  Ask your healthcare provider how often you should have a Pap smear  · A mammogram  is an x-ray of your breasts to screen for breast cancer  Experts recommend mammograms every 2 years starting at age 48 years  You may need a mammogram at age 52 years or younger if you have an increased risk for breast cancer  Talk to your healthcare provider about when you should start having mammograms and how often you need them  Vaccines you may need:   · Get an influenza vaccine  every year  The influenza vaccine protects you from the flu  Several types of viruses cause the flu  The viruses change over time, so new vaccines are made each year  · Get a tetanus-diphtheria (Td) booster vaccine  every 10 years  This vaccine protects you against tetanus and diphtheria  Tetanus is a severe infection that may cause painful muscle spasms and lockjaw  Diphtheria is a severe bacterial infection that causes a thick covering in the back of your mouth and throat  · Get a human papillomavirus (HPV) vaccine  if you are female and aged 23 to 32 or male 23 to 24 and never received it  This vaccine protects you from HPV infection  HPV is the most common infection spread by sexual contact  HPV may also cause vaginal, penile, and anal cancers  · Get a pneumococcal vaccine  if you are aged 72 years or older  The pneumococcal vaccine is an injection given to protect you from pneumococcal disease  Pneumococcal disease is an infection caused by pneumococcal bacteria   The infection may cause pneumonia, meningitis, or an ear infection  · Get a shingles vaccine  if you are 60 or older, even if you have had shingles before  The shingles vaccine is an injection to protect you from the varicella-zoster virus  This is the same virus that causes chickenpox  Shingles is a painful rash that develops in people who had chickenpox or have been exposed to the virus  How to eat healthy:  My Plate is a model for planning healthy meals  It shows the types and amounts of foods that should go on your plate  Fruits and vegetables make up about half of your plate, and grains and protein make up the other half  A serving of dairy is included on the side of your plate  The amount of calories and serving sizes you need depends on your age, gender, weight, and height  Examples of healthy foods are listed below:  · Eat a variety of vegetables  such as dark green, red, and orange vegetables  You can also include canned vegetables low in sodium (salt) and frozen vegetables without added butter or sauces  · Eat a variety of fresh fruits , canned fruit in 100% juice, frozen fruit, and dried fruit  · Include whole grains  At least half of the grains you eat should be whole grains  Examples include whole-wheat bread, wheat pasta, brown rice, and whole-grain cereals such as oatmeal     · Eat a variety of protein foods such as seafood (fish and shellfish), lean meat, and poultry without skin (turkey and chicken)  Examples of lean meats include pork leg, shoulder, or tenderloin, and beef round, sirloin, tenderloin, and extra lean ground beef  Other protein foods include eggs and egg substitutes, beans, peas, soy products, nuts, and seeds  · Choose low-fat dairy products such as skim or 1% milk or low-fat yogurt, cheese, and cottage cheese  · Limit unhealthy fats  such as butter, hard margarine, and shortening  Exercise:  Exercise at least 30 minutes per day on most days of the week   Some examples of exercise include walking, biking, dancing, and swimming  You can also fit in more physical activity by taking the stairs instead of the elevator or parking farther away from stores  Include muscle strengthening activities 2 days each week  Regular exercise provides many health benefits  It helps you manage your weight, and decreases your risk for type 2 diabetes, heart disease, stroke, and high blood pressure  Exercise can also help improve your mood  Ask your healthcare provider about the best exercise plan for you  General health and safety guidelines:   · Do not smoke  Nicotine and other chemicals in cigarettes and cigars can cause lung damage  Ask your healthcare provider for information if you currently smoke and need help to quit  E-cigarettes or smokeless tobacco still contain nicotine  Talk to your healthcare provider before you use these products  · Limit alcohol  A drink of alcohol is 12 ounces of beer, 5 ounces of wine, or 1½ ounces of liquor  · Lose weight, if needed  Being overweight increases your risk of certain health conditions  These include heart disease, high blood pressure, type 2 diabetes, and certain types of cancer  · Protect your skin  Do not sunbathe or use tanning beds  Use sunscreen with a SPF 15 or higher  Apply sunscreen at least 15 minutes before you go outside  Reapply sunscreen every 2 hours  Wear protective clothing, hats, and sunglasses when you are outside  · Drive safely  Always wear your seatbelt  Make sure everyone in your car wears a seatbelt  A seatbelt can save your life if you are in an accident  Do not use your cell phone when you are driving  This could distract you and cause an accident  Pull over if you need to make a call or send a text message  · Practice safe sex  Use latex condoms if are sexually active and have more than one partner  Your healthcare provider may recommend screening tests for sexually transmitted infections (STIs)  · Wear helmets, lifejackets, and protective gear  Always wear a helmet when you ride a bike or motorcycle, go skiing, or play sports that could cause a head injury  Wear protective equipment when you play sports  Wear a lifejacket when you are on a boat or doing water sports  © Copyright [address] Information is for End User's use only and may not be sold, redistributed or otherwise used for commercial purposes  All illustrations and images included in CareNotes® are the copyrighted property of A D A M , Inc  or [facility]   The above information is an  only  It is not intended as medical advice for individual conditions or treatments  Talk to your doctor, nurse or pharmacist before following any medical regimen to see if it is safe and effective for you

## 2021-06-04 NOTE — PROGRESS NOTES
[address]    NAME: Robin Oliva  AGE: 37 y o  SEX: female  : 1985     DATE: 2021     Assessment and Plan:     Problem List Items Addressed This Visit     None      Visit Diagnoses     Early satiety    -  Primary    Bloating symptom              Patient will continue to keep food diary and avoid dairy products (as they seem to exacerbate sxs)  She will trial simethicone for 2 weeks  May consider GI referral if sxs do not alleviate  Immunizations and preventive care screenings were discussed with patient today  Appropriate education was printed on patient's after visit summary  Counseling:  Alcohol/drug use: discussed moderation in alcohol intake, the recommendations for healthy alcohol use, and avoidance of illicit drug use  Dental Health: discussed importance of regular tooth brushing, flossing, and dental visits  Injury prevention: discussed safety/seat belts, safety helmets, smoke detectors, carbon dioxide detectors, and smoking near bedding or upholstery  Sexual health: discussed sexually transmitted diseases, partner selection, use of condoms, avoidance of unintended pregnancy, and contraceptive alternatives  · Exercise: the importance of regular exercise/physical activity was discussed  Recommend exercise 3-5 times per week for at least 30 minutes  No follow-ups on file  Chief Complaint:     Chief Complaint   Patient presents with    Annual Exam      History of Present Illness:     Adult Annual Physical   Patient here for a comprehensive physical exam  The patient reports problems - gas and bloating  Diet and Physical Activity  · Diet/Nutrition: well balanced diet  · Exercise: walking        Depression Screening  PHQ-9 Depression Screening    PHQ-9:   Frequency of the following problems over the past two weeks:      Little interest or pleasure in doing things: 0 - not at all  Feeling down, depressed, or hopeless: 0 - not at all  PHQ-2 Score: 0       General Health  · Sleep: sleeps well  Improved   · Hearing: normal - bilateral   · Vision: goes for regular eye exams  · Dental: regular dental visits  /GYN Health  · Last menstrual period: 5/30/21  · Contraceptive method: tubal ligation  · History of STDs?: no      Review of Systems:     Review of Systems   Constitutional: Negative for fatigue  Respiratory: Negative for cough and shortness of breath  Gastrointestinal: Negative for abdominal distention and abdominal pain  Bloating   Musculoskeletal: Negative for arthralgias and myalgias  Neurological: Negative for light-headedness and headaches  Past Medical History:     Past Medical History:   Diagnosis Date    Anxiety 6/5/2018    Asthma     Psychiatric disorder     Varicella       Past Surgical History:     Past Surgical History:   Procedure Laterality Date    CHOLECYSTECTOMY  2008    CONDYLOMA EXCISION/FULGURATION      SALPINGECTOMY Bilateral 7/3/2017    Procedure: SALPINGECTOMY;  Surgeon: Tiffany Nieto MD;  Location: [address] [place] [address];  Service: Gynecology    TUBAL LIGATION        Social History:     E-Cigarette/Vaping    E-Cigarette Use Never User      E-Cigarette/Vaping Substances    Nicotine No     THC No     CBD No     Flavoring No     Other No     Unknown No      Social History     Socioeconomic History    Marital status: Single     Spouse name: Not on file    Number of children: Not on file    Years of education: Not on file    Highest education level: Not on file   Occupational History    Not on file   Social Needs    Financial resource strain: Not on file    Food insecurity     Worry: Not on file     Inability: Not on file   [employer] needs     Medical: Not on file     Non-medical: Not on file   Tobacco Use    Smoking status: Never Smoker    Smokeless tobacco: Never Used   Substance and Sexual Activity    Alcohol use: No    Drug use:  No  Sexual activity: Yes     Partners: Male     Comment: onset of intercourse 15 yrs of age and consentual / unprotected sex  / oral sex educated on STD and HPV  / lifetime partners-8  / current partner 1 yr 1 partner    Lifestyle    Physical activity     Days per week: Not on file     Minutes per session: Not on file    Stress: Not on file   Relationships    Social connections     Talks on phone: Not on file     Gets together: Not on file     Attends [employer] service: Not on file     Active member of club or organization: Not on file     Attends meetings of clubs or organizations: Not on file     Relationship status: Not on file    Intimate partner violence     Fear of current or ex partner: Not on file     Emotionally abused: Not on file     Physically abused: Not on file     Forced sexual activity: Not on file   Other Topics Concern    Not on file   Social History Narrative    Not on file      Family History:     Family History   Problem Relation Age of Onset    No Known Problems Mother     No Known Problems Father     No Known Problems Sister     No Known Problems Brother     No Known Problems Daughter     No Known Problems Son     Stroke Maternal Aunt     No Known Problems Maternal Uncle     No Known Problems Paternal Aunt     No Known Problems Paternal Uncle     Diabetes Maternal Grandmother     Heart disease Maternal Grandmother     No Known Problems Maternal Grandfather     No Known Problems Paternal Grandmother     No Known Problems Paternal Grandfather       Current Medications:     Current Outpatient Medications   Medication Sig Dispense Refill    DULoxetine (CYMBALTA) 30 mg delayed release capsule Take 1 capsule (30 mg total) by mouth daily Take one pill per day for 7 days, then two pills per day   170 capsule 1    DULoxetine (CYMBALTA) 60 mg delayed release capsule Take 1 capsule (60 mg total) by mouth daily 90 capsule 3    sertraline (ZOLOFT) 25 mg tablet Take 1 tablet (25 mg total) by mouth daily (Patient not taking: Reported on 10/1/2020) 30 tablet 5     No current facility-administered medications for this visit  Allergies: Allergies   Allergen Reactions    Pollen Extract       Physical Exam:     /80   Pulse 98   Temp 98 °F (36 7 °C) (Tympanic)   Resp 16   Ht 5' 2" (1 575 m)   Wt 58 5 kg (128 lb 14 4 oz)   SpO2 98%   BMI 23 58 kg/m²     Physical Exam  Vitals signs reviewed  Constitutional:       Appearance: She is obese  HENT:      Head: Normocephalic and atraumatic  Right Ear: Tympanic membrane, ear canal and external ear normal       Left Ear: Tympanic membrane, ear canal and external ear normal       Nose: Nose normal       Mouth/Throat:      Mouth: Mucous membranes are moist    Eyes:      Extraocular Movements: Extraocular movements intact  Conjunctiva/sclera: Conjunctivae normal       Pupils: Pupils are equal, round, and reactive to light  Neck:      Musculoskeletal: Normal range of motion and neck supple  Cardiovascular:      Rate and Rhythm: Normal rate and regular rhythm  Pulses: Normal pulses  Heart sounds: Normal heart sounds  Pulmonary:      Effort: Pulmonary effort is normal       Breath sounds: Normal breath sounds  Abdominal:      General: Bowel sounds are normal  There is no distension  Palpations: Abdomen is soft  Tenderness: There is no abdominal tenderness  Musculoskeletal: Normal range of motion  Skin:     General: Skin is warm and dry  Capillary Refill: Capillary refill takes less than 2 seconds  Neurological:      General: No focal deficit present  Mental Status: She is alert and oriented to person, place, and time  Mental status is at baseline  Cranial Nerves: No cranial nerve deficit  Sensory: No sensory deficit  Motor: No weakness  Psychiatric:         Mood and Affect: Mood normal          Behavior: Behavior normal          Thought Content:  Thought content normal  Rubens Diaz MD   [address]

## 2022-01-11 ENCOUNTER — OFFICE VISIT (OUTPATIENT)
Dept: FAMILY MEDICINE CLINIC | Facility: CLINIC | Age: 37
End: 2022-01-11
Payer: COMMERCIAL

## 2022-01-11 VITALS
TEMPERATURE: 98.8 F | SYSTOLIC BLOOD PRESSURE: 102 MMHG | BODY MASS INDEX: 23.81 KG/M2 | RESPIRATION RATE: 16 BRPM | HEIGHT: 62 IN | WEIGHT: 129.4 LBS | OXYGEN SATURATION: 99 % | HEART RATE: 85 BPM | DIASTOLIC BLOOD PRESSURE: 80 MMHG

## 2022-01-11 DIAGNOSIS — F41.9 ANXIETY: Primary | ICD-10-CM

## 2022-01-11 DIAGNOSIS — M79.7 FIBROMYALGIA: ICD-10-CM

## 2022-01-11 PROCEDURE — 99213 OFFICE O/P EST LOW 20 MIN: CPT | Performed by: STUDENT IN AN ORGANIZED HEALTH CARE EDUCATION/TRAINING PROGRAM

## 2022-01-11 RX ORDER — DULOXETIN HYDROCHLORIDE 20 MG/1
CAPSULE, DELAYED RELEASE ORAL
Qty: 60 CAPSULE | Refills: 0 | Status: SHIPPED | OUTPATIENT
Start: 2022-01-11 | End: 2022-02-14

## 2022-01-11 RX ORDER — BUSPIRONE HYDROCHLORIDE 5 MG/1
5 TABLET ORAL 2 TIMES DAILY
Qty: 60 TABLET | Refills: 1 | Status: SHIPPED | OUTPATIENT
Start: 2022-01-11

## 2022-01-11 NOTE — PROGRESS NOTES
Assessment/Plan:      Diagnoses and all orders for this visit:    Anxiety  -     DULoxetine (CYMBALTA) 20 mg capsule; Take 2 capsules (40 mg total) by mouth daily for 20 days, THEN 1 capsule (20 mg total) daily for 20 days  -     busPIRone (BUSPAR) 5 mg tablet; Take 1 tablet (5 mg total) by mouth 2 (two) times a day    Fibromyalgia  -     DULoxetine (CYMBALTA) 20 mg capsule; Take 2 capsules (40 mg total) by mouth daily for 20 days, THEN 1 capsule (20 mg total) daily for 20 days  -     busPIRone (BUSPAR) 5 mg tablet; Take 1 tablet (5 mg total) by mouth 2 (two) times a day        Will wean patient off of duloxetine and try combo of BuSpar and Lyrica  Will f/u in one month to monitor progress  Subjective:     Patient ID: Carmen Vora is a 31 y o  female  Anxiety  Presents for follow-up visit  Patient reports no chest pain, dizziness, nausea, nervous/anxious behavior, palpitations, shortness of breath or suicidal ideas  Symptoms occur most days  Side effects of treatment include GI discomfort  Patient states she still gets generalized pains across her neck, shoulder, and chest   Patient reports she has been worked up by Cardiology and workup was found to be negative  Additionally, patient seeks GI doctor to rule out cause for occasional GI discomfort  Review of Systems   Constitutional: Negative for chills and fever  HENT: Negative for sore throat  Respiratory: Negative for cough and shortness of breath  Cardiovascular: Negative for chest pain and palpitations  Gastrointestinal: Negative for abdominal pain, constipation, diarrhea, nausea and vomiting  Genitourinary: Negative for difficulty urinating and dysuria  Neurological: Negative for dizziness and headaches  Psychiatric/Behavioral: Negative for suicidal ideas  The patient is not nervous/anxious            Objective:  Vitals:    01/11/22 1533   BP: 102/80   Pulse: 85   Resp: 16   Temp: 98 8 °F (37 1 °C)   SpO2: 99% Physical Exam  Constitutional:       Appearance: Normal appearance  HENT:      Head: Normocephalic and atraumatic  Cardiovascular:      Rate and Rhythm: Normal rate and regular rhythm  Heart sounds: Normal heart sounds  No murmur heard  Pulmonary:      Effort: Pulmonary effort is normal  No respiratory distress  Breath sounds: Normal breath sounds  No wheezing  Abdominal:      Palpations: Abdomen is soft  Tenderness: There is no abdominal tenderness  Musculoskeletal:      Right lower leg: No edema  Left lower leg: No edema  Neurological:      Mental Status: She is alert

## 2022-01-17 ENCOUNTER — TELEPHONE (OUTPATIENT)
Dept: FAMILY MEDICINE CLINIC | Facility: CLINIC | Age: 37
End: 2022-01-17

## 2022-01-17 PROCEDURE — U0005 INFEC AGEN DETEC AMPLI PROBE: HCPCS | Performed by: STUDENT IN AN ORGANIZED HEALTH CARE EDUCATION/TRAINING PROGRAM

## 2022-01-17 PROCEDURE — U0003 INFECTIOUS AGENT DETECTION BY NUCLEIC ACID (DNA OR RNA); SEVERE ACUTE RESPIRATORY SYNDROME CORONAVIRUS 2 (SARS-COV-2) (CORONAVIRUS DISEASE [COVID-19]), AMPLIFIED PROBE TECHNIQUE, MAKING USE OF HIGH THROUGHPUT TECHNOLOGIES AS DESCRIBED BY CMS-2020-01-R: HCPCS | Performed by: STUDENT IN AN ORGANIZED HEALTH CARE EDUCATION/TRAINING PROGRAM

## 2022-01-17 NOTE — TELEPHONE ENCOUNTER
Patient is requesting a COVID-19 Order be placed in their chart  Patient has been exposed and DOES have symptoms  [facility] locations and hours of testing  Symptoms Listed below;   Headache started 1/11/2022  Loss of taste and smell started 1/15/2022                    Symptoms Started;

## 2022-02-14 ENCOUNTER — OFFICE VISIT (OUTPATIENT)
Dept: FAMILY MEDICINE CLINIC | Facility: CLINIC | Age: 37
End: 2022-02-14
Payer: COMMERCIAL

## 2022-02-14 VITALS
HEIGHT: 62 IN | WEIGHT: 131.5 LBS | DIASTOLIC BLOOD PRESSURE: 60 MMHG | RESPIRATION RATE: 16 BRPM | BODY MASS INDEX: 24.2 KG/M2 | TEMPERATURE: 97.6 F | SYSTOLIC BLOOD PRESSURE: 90 MMHG | OXYGEN SATURATION: 97 % | HEART RATE: 81 BPM

## 2022-02-14 DIAGNOSIS — R06.02 SHORTNESS OF BREATH: Primary | ICD-10-CM

## 2022-02-14 DIAGNOSIS — F41.9 ANXIETY: ICD-10-CM

## 2022-02-14 DIAGNOSIS — M79.7 FIBROMYALGIA: ICD-10-CM

## 2022-02-14 PROCEDURE — 3008F BODY MASS INDEX DOCD: CPT | Performed by: FAMILY MEDICINE

## 2022-02-14 PROCEDURE — 99213 OFFICE O/P EST LOW 20 MIN: CPT | Performed by: FAMILY MEDICINE

## 2022-02-14 PROCEDURE — 1036F TOBACCO NON-USER: CPT | Performed by: FAMILY MEDICINE

## 2022-02-14 RX ORDER — DULOXETIN HYDROCHLORIDE 20 MG/1
CAPSULE, DELAYED RELEASE ORAL
Qty: 60 CAPSULE | Refills: 0 | Status: SHIPPED | OUTPATIENT
Start: 2022-02-14 | End: 2022-04-27 | Stop reason: ALTCHOICE

## 2022-02-14 NOTE — PROGRESS NOTES
Assessment/Plan:      Diagnoses and all orders for this visit:    Shortness of breath  -     Ambulatory Referral to Pulmonology; Future    Anxiety        Referral placed to pulmonology for PFTs/additional testing  Chest x-ray in April 2020 negative  Patient currently being weaned off Cymbalta and is on BuSpar 5 mg daily  Will continue to follow patient  Subjective:     Patient ID: Wiliam Whitaker is a 31 y o  female  Anxiety  Presents for follow-up visit  Symptoms include shortness of breath  Patient reports no chest pain or nervous/anxious behavior  Symptoms occur constantly  The severity of symptoms is interfering with daily activities  The quality of sleep is fair  Compliance with medications is %  Patient's main concern is her episodes of shortness of breath  Patient states that these episodes are unrelated to her anxiety can sometimes cause additional pain along her upper back and beneath her lungs  Patient states that she sleeps on her stomach to help her lungs expand which helps alleviate her symptoms  Review of Systems   Respiratory: Positive for shortness of breath  Cardiovascular: Negative for chest pain  Psychiatric/Behavioral: The patient is not nervous/anxious  Objective:  Vitals:    02/14/22 1546   BP: 90/60   Pulse: 81   Resp: 16   Temp: 97 6 °F (36 4 °C)   SpO2: 97%          Physical Exam  Constitutional:       Appearance: Normal appearance  HENT:      Head: Normocephalic and atraumatic  Cardiovascular:      Rate and Rhythm: Normal rate and regular rhythm  Heart sounds: Normal heart sounds  No murmur heard  Pulmonary:      Effort: Pulmonary effort is normal  No respiratory distress  Breath sounds: Normal breath sounds  No wheezing  Abdominal:      Palpations: Abdomen is soft  Tenderness: There is no abdominal tenderness  Musculoskeletal:      Right lower leg: No edema  Left lower leg: No edema     Neurological:      Mental Status: She is alert

## 2022-04-07 ENCOUNTER — OFFICE VISIT (OUTPATIENT)
Dept: PULMONOLOGY | Facility: MEDICAL CENTER | Age: 37
End: 2022-04-07
Payer: COMMERCIAL

## 2022-04-07 VITALS
RESPIRATION RATE: 12 BRPM | HEART RATE: 71 BPM | HEIGHT: 62 IN | OXYGEN SATURATION: 100 % | WEIGHT: 134.4 LBS | TEMPERATURE: 97.3 F | BODY MASS INDEX: 24.73 KG/M2 | DIASTOLIC BLOOD PRESSURE: 62 MMHG | SYSTOLIC BLOOD PRESSURE: 122 MMHG

## 2022-04-07 DIAGNOSIS — R06.02 SHORTNESS OF BREATH: ICD-10-CM

## 2022-04-07 DIAGNOSIS — M79.7 FIBROMYALGIA: ICD-10-CM

## 2022-04-07 DIAGNOSIS — J45.20 MILD INTERMITTENT ASTHMA WITHOUT COMPLICATION: Primary | ICD-10-CM

## 2022-04-07 PROCEDURE — 94010 BREATHING CAPACITY TEST: CPT | Performed by: INTERNAL MEDICINE

## 2022-04-07 PROCEDURE — 99204 OFFICE O/P NEW MOD 45 MIN: CPT | Performed by: INTERNAL MEDICINE

## 2022-04-07 NOTE — PATIENT INSTRUCTIONS
I made referral to rheumatologist Dr Sarita Red    CBC, C reactive protein, sed rate and ANGELO blood work    None of these are fasting    Try using her albuterol inhaler 2 puffs before you know your going to be active and see if this helps her symptoms    If you think you have problems with her asthma I can always call our phone number  [phone]

## 2022-04-08 NOTE — PROGRESS NOTES
Assessment/Plan:       Problem List Items Addressed This Visit        Respiratory    Mild intermittent asthma - Primary     Spirometry today shows lung volumes to be normal   Symptoms do not seem to be related to her asthma  She does have musculoskeletal pain of her neck and thorax him back  Suspect this is what is causing her feeling of shortness of breath  Not having any wheezing and not having any improvement in her symptoms with use of her albuterol inhaler    Chest x-ray done on 04/05 was unremarkable            Other    Fibromyalgia     She does have pains in her neck, anterior posterior chest wall headache  May have underlying fibromyalgia  I did some basic blood work including C reactive protein ANGELO CBC to evaluate for any possibility autoimmune disorder  I did make referral for her to see rheumatologist Dr Susy Coffman Referral to Rheumatology    ANGELO Screen w/ Reflex to Titer/Pattern    C-reactive protein    CBC and differential    Sedimentation rate, automated    Shortness of breath     She does have intermittent shortness of breath which is not seem to be related to her asthma  Spirometry today shows lung volumes to be normal   She had no improvement in her symptoms even with use of albuterol inhaler  Not having any wheezing  Chest x-ray from April 5th was normal         Relevant Orders    POCT spirometry (Completed)            Plan for follow up:  Return if symptoms worsen or fail to improve  All questions are answered to the patient's satisfaction and understanding  She verbalizes understanding  She is encouraged to call with any further questions or concerns  Portions of the record may have been created with voice recognition software  Occasional wrong word or "sound a like" substitutions may have occurred due to the inherent limitations of voice recognition software    Read the chart carefully and recognize, using context, where substitutions have occurred  a    Electronically Signed by Duyen Garcia DO    ______________________________________________________________________    Chief Complaint: Intermittent chest discomfort and shortness of breath       Patient ID: Diane Good is a 31 y o  y o  female has a past medical history of Anxiety (6/5/2018), Asthma, Psychiatric disorder, and Varicella  4/7/2022  Patient presents today for initial visit for evaluation of shortness of breath and asthma    HPI     Diane Good is 31years old and has history of asthma since she was a child  In the past 2 years she states she has been getting intermittent discomfort in her chest anterior and posterior and then gets some neck discomfort and headache  She will feel short of breath when she has this episode  Not really have any wheezing and no relief using her albuterol inhaler  She also gets tension in her head at times  She was told that this could be related to anxiety and was prescribed Cymbalta and BuSpar with no improvement  These symptoms have been daily  Next day can occur at rest and with activity  Not having any cough or wheezing  She did have a portable chest x-ray done on April 5th which I reviewed which was normal     No arthralgias  She does not wake up short of breath at night  Occupational/Exposure history:  She does work in a kitchen  Her last image of the will be and couple days that her job is being phased out      Review of Systems   Constitutional: Negative for appetite change and fever  HENT: Positive for ear pain  Negative for postnasal drip, rhinorrhea, sneezing, sore throat and trouble swallowing  Eyes: Negative for pain and redness  Respiratory: Positive for shortness of breath  Cardiovascular: Positive for chest pain  Gastrointestinal: Negative for abdominal pain  Endocrine: Negative for polydipsia and polyphagia  Genitourinary: Negative for dysuria     Musculoskeletal: Positive for back pain, myalgias, neck pain and neck stiffness  Neurological: Positive for headaches  Psychiatric/Behavioral: Negative for decreased concentration  Social history: She reports that she has never smoked  She has never used smokeless tobacco  She reports that she does not drink alcohol and does not use drugs      Past surgical history:   Past Surgical History:   Procedure Laterality Date    CHOLECYSTECTOMY  2008    CONDYLOMA EXCISION/FULGURATION      SALPINGECTOMY Bilateral 7/3/2017    Procedure: SALPINGECTOMY;  Surgeon: Mandeep Wiseman MD;  Location: [facility];  Service: Gynecology    TUBAL LIGATION       Family history:   Family History   Problem Relation Age of Onset    No Known Problems Mother     No Known Problems Father     No Known Problems Sister     No Known Problems Brother     No Known Problems Daughter     No Known Problems Son     Stroke Maternal Aunt     No Known Problems Maternal Uncle     No Known Problems Paternal Aunt     No Known Problems Paternal Uncle     Diabetes Maternal Grandmother     Heart disease Maternal Grandmother     No Known Problems Maternal Grandfather     No Known Problems Paternal Grandmother     No Known Problems Paternal Grandfather        Immunization History   Administered Date(s) Administered    DTaP 07/18/2016    Hep B, adult 04/23/2000, 10/23/2000, 11/22/2000    Influenza Quadrivalent Preservative Free 3 years and older IM 11/21/2016, 12/21/2017    MMR 09/07/2011, 09/21/2016    Tdap 09/07/2011, 07/14/2016     Current Outpatient Medications   Medication Sig Dispense Refill    busPIRone (BUSPAR) 5 mg tablet Take 1 tablet (5 mg total) by mouth 2 (two) times a day 60 tablet 1    DULoxetine (CYMBALTA) 20 mg capsule TAKE 2 CAPSULES BY MOUTH ONCE DAILY FOR 20 DAYS, THEN 1 CAP ONCE DAILY FOR 20 DAYS 60 capsule 0    simethicone (MYLICON) 80 mg chewable tablet Chew 1 tablet (80 mg total) every 6 (six) hours as needed for flatulence (Patient not taking: Reported on 2/14/2022 ) 30 tablet 0     No current facility-administered medications for this visit  Allergies: Pollen extract    Objective:  Vitals:    04/07/22 1457   BP: 122/62   Pulse: 71   Resp: 12   Temp: (!) 97 3 °F (36 3 °C)   TempSrc: Temporal   SpO2: 100%   Weight: 61 kg (134 lb 6 4 oz)   Height: 5' 2" (1 575 m)   Oxygen Therapy  SpO2: 100 %    Wt Readings from Last 3 Encounters:   04/07/22 61 kg (134 lb 6 4 oz)   02/14/22 59 6 kg (131 lb 8 oz)   01/11/22 58 7 kg (129 lb 6 4 oz)     Body mass index is 24 58 kg/m²  Physical Exam  Vitals reviewed  Constitutional:       General: She is not in acute distress  Appearance: Normal appearance  She is well-developed  HENT:      Head: Normocephalic  Right Ear: External ear normal       Left Ear: External ear normal       Nose: Nose normal       Mouth/Throat:      Pharynx: No oropharyngeal exudate  Eyes:      Conjunctiva/sclera: Conjunctivae normal       Pupils: Pupils are equal, round, and reactive to light  Neck:      Vascular: No JVD  Trachea: No tracheal deviation  Cardiovascular:      Rate and Rhythm: Normal rate and regular rhythm  Heart sounds: Normal heart sounds  Pulmonary:      Effort: Pulmonary effort is normal       Breath sounds: Normal breath sounds  Comments: Lung sounds are clear  No wheezes, crackles or rhonchi  Abdominal:      General: There is no distension  Palpations: Abdomen is soft  Tenderness: There is no abdominal tenderness  There is no guarding  Musculoskeletal:      Cervical back: Neck supple  Comments: No edema, cyanosis or clubbing   Lymphadenopathy:      Cervical: No cervical adenopathy  Skin:     General: Skin is warm and dry  Findings: No rash  Neurological:      Mental Status: She is alert and oriented to person, place, and time  Psychiatric:         Mood and Affect: Mood normal          Behavior: Behavior normal          Thought Content:  Thought content normal          Lab Review:   Lab Results   Component Value Date    K 4 2 08/17/2020    CL 99 08/17/2020    CO2 23 08/17/2020    BUN 9 08/17/2020    CREATININE 0 69 08/17/2020     Lab Results   Component Value Date    WBC 7 5 10/19/2020    WBC 11-30 (A) 10/24/2017    HGB 13 1 10/19/2020    HGB 11 7 09/20/2016    HGB 13 2 07/09/2016    HCT 38 4 10/19/2020    HCT 33 7 (L) 09/20/2016    HCT 38 5 07/09/2016    MCV 94 10/19/2020    MCV 95 09/20/2016    MCV 98 (H) 07/09/2016     10/19/2020     09/20/2016     07/09/2016       Diagnostics:  I have personally reviewed pertinent films in PACS  Chest x-ray: 4/5/22 - chest x-ray is normal    Office Spirometry Results: done today    FVC - 4 06 L   116%  FEV1 -  25 L    111%  FEV1/FVC% - 80%    Lung volumes normal         Answers for HPI/ROS submitted by the patient on 4/5/2022  Do you have chest tightness?: Yes  Do you have difficulty breathing?: Yes  Chronicity: chronic  When did you first notice your symptoms?: more than 1 year ago  How often do your symptoms occur?: constantly  Since you first noticed this problem, how has it changed?: unchanged  Do you have shortness of breath that occurs with effort or exertion?: Yes  Do you have ear congestion?: No  Do you have heartburn?: No  Do you have fatigue?: Yes  Do you have nasal congestion?: No  Do you have shortness of breath when lying flat?: No  Do you have shortness of breath when you wake up?: Yes  Do you have sweats?: No  Have you experienced weight loss?: No  Which of the following makes your symptoms worse?: any activity, exercise, exposure to fumes, strenuous activity  Which of the following makes your symptoms better?: nothing, rest

## 2022-04-10 PROBLEM — J45.20 MILD INTERMITTENT ASTHMA: Status: ACTIVE | Noted: 2022-04-10

## 2022-04-10 PROBLEM — R06.02 SHORTNESS OF BREATH: Status: ACTIVE | Noted: 2022-04-10

## 2022-04-10 NOTE — ASSESSMENT & PLAN NOTE
She does have pains in her neck, anterior posterior chest wall headache  May have underlying fibromyalgia  I did some basic blood work including C reactive protein ANGELO CBC to evaluate for any possibility autoimmune disorder      I did make referral for her to see rheumatologist Dr Lo Coats

## 2022-04-10 NOTE — ASSESSMENT & PLAN NOTE
Spirometry today shows lung volumes to be normal   Symptoms do not seem to be related to her asthma  She does have musculoskeletal pain of her neck and thorax him back  Suspect this is what is causing her feeling of shortness of breath      Not having any wheezing and not having any improvement in her symptoms with use of her albuterol inhaler    Chest x-ray done on 04/05 was unremarkable

## 2022-04-10 NOTE — ASSESSMENT & PLAN NOTE
She does have intermittent shortness of breath which is not seem to be related to her asthma  Spirometry today shows lung volumes to be normal   She had no improvement in her symptoms even with use of albuterol inhaler  Not having any wheezing    Chest x-ray from April 5th was normal

## 2022-04-12 ENCOUNTER — TELEPHONE (OUTPATIENT)
Dept: OBGYN CLINIC | Facility: HOSPITAL | Age: 37
End: 2022-04-12

## 2022-04-12 NOTE — TELEPHONE ENCOUNTER
Spoke with patient and pulmonology wanted her to be seen, they sent her for BW which she had done on 4/11 at [facility]  I provided her with our fax number and ask that she have them fax over the results so we can review them to see if she needs scheduling, thanks

## 2022-04-12 NOTE — TELEPHONE ENCOUNTER
Patient needs to be seen by rheumatology and is looking to schedule with Dr Venkata De Oliveira  The diagnosis is Fibro but she said it is other thing as well? Should she be scheduled?

## 2022-04-12 NOTE — TELEPHONE ENCOUNTER
She needs to tell us what the other condition is  If it is only for fibromyalgia then she may not be scheduled, thanks

## 2022-04-13 LAB
BASOPHILS # BLD AUTO: 0.1 X10E3/UL (ref 0–0.2)
BASOPHILS NFR BLD AUTO: 1 %
CRP SERPL-MCNC: <1 MG/L (ref 0–10)
EOSINOPHIL # BLD AUTO: 0.1 X10E3/UL (ref 0–0.4)
EOSINOPHIL NFR BLD AUTO: 2 %
ERYTHROCYTE [DISTWIDTH] IN BLOOD BY AUTOMATED COUNT: 11.9 % (ref 11.7–15.4)
ERYTHROCYTE [SEDIMENTATION RATE] IN BLOOD BY WESTERGREN METHOD: 7 MM/HR (ref 0–32)
HCT VFR BLD AUTO: 40.1 % (ref 34–46.6)
HGB BLD-MCNC: 13.9 G/DL (ref 11.1–15.9)
IMM GRANULOCYTES # BLD: 0 X10E3/UL (ref 0–0.1)
IMM GRANULOCYTES NFR BLD: 0 %
LYMPHOCYTES # BLD AUTO: 2.5 X10E3/UL (ref 0.7–3.1)
LYMPHOCYTES NFR BLD AUTO: 32 %
MCH RBC QN AUTO: 32.2 PG (ref 26.6–33)
MCHC RBC AUTO-ENTMCNC: 34.7 G/DL (ref 31.5–35.7)
MCV RBC AUTO: 93 FL (ref 79–97)
MONOCYTES # BLD AUTO: 0.7 X10E3/UL (ref 0.1–0.9)
MONOCYTES NFR BLD AUTO: 8 %
NEUTROPHILS # BLD AUTO: 4.5 X10E3/UL (ref 1.4–7)
NEUTROPHILS NFR BLD AUTO: 57 %
PLATELET # BLD AUTO: 267 X10E3/UL (ref 150–450)
RBC # BLD AUTO: 4.32 X10E6/UL (ref 3.77–5.28)
WBC # BLD AUTO: 7.9 X10E3/UL (ref 3.4–10.8)

## 2022-04-14 LAB — ANA TITR SER IF: NEGATIVE {TITER}

## 2022-04-18 ENCOUNTER — TELEPHONE (OUTPATIENT)
Dept: OBGYN CLINIC | Facility: OTHER | Age: 37
End: 2022-04-18

## 2022-04-18 NOTE — TELEPHONE ENCOUNTER
Patient said that [id number] [facility] called to fax over some lab work but was disconnected so patient called      But then she said [facility] said we have to send a HIPAA Form to [facility]      C/b #   [phone]

## 2022-04-20 NOTE — TELEPHONE ENCOUNTER
Patient's Labs are scanned under the media tab  She would like to know if we can review them to schedule an appt? Patient can be reached at [phone]

## 2022-04-20 NOTE — TELEPHONE ENCOUNTER
Please let her know based on this testing there are no abnormal labs that would need a rheumatology appointment  If it is for fibromyalgia we do not manage this and she should see her primary care physician

## 2022-04-27 ENCOUNTER — OFFICE VISIT (OUTPATIENT)
Dept: FAMILY MEDICINE CLINIC | Facility: CLINIC | Age: 37
End: 2022-04-27
Payer: COMMERCIAL

## 2022-04-27 VITALS
SYSTOLIC BLOOD PRESSURE: 122 MMHG | TEMPERATURE: 97.5 F | HEART RATE: 68 BPM | DIASTOLIC BLOOD PRESSURE: 86 MMHG | WEIGHT: 134.2 LBS | RESPIRATION RATE: 16 BRPM | HEIGHT: 62 IN | BODY MASS INDEX: 24.69 KG/M2 | OXYGEN SATURATION: 99 %

## 2022-04-27 DIAGNOSIS — R10.9 ABDOMINAL CRAMPING: ICD-10-CM

## 2022-04-27 DIAGNOSIS — K90.49 MILK INTOLERANCE: Primary | ICD-10-CM

## 2022-04-27 DIAGNOSIS — R10.30 LOWER ABDOMINAL PAIN: ICD-10-CM

## 2022-04-27 PROCEDURE — 1036F TOBACCO NON-USER: CPT | Performed by: FAMILY MEDICINE

## 2022-04-27 PROCEDURE — 99213 OFFICE O/P EST LOW 20 MIN: CPT | Performed by: FAMILY MEDICINE

## 2022-04-27 NOTE — PROGRESS NOTES
Assessment/Plan:      Diagnoses and all orders for this visit:    Milk intolerance  -     Ambulatory Referral to Gastroenterology; Future    Lower abdominal pain  -     Ambulatory Referral to Gastroenterology; Future    Abdominal cramping  -     Ambulatory Referral to Gastroenterology; Future      -Patient requested referral to GI which was written today  Symptoms of abdominal pain and immediate need to use bathroom after drinking milk only in the morning and feels like she is going to pass out while sitting on the toilet  Denies any straining while on toilet/ vasovagal symptoms  Hx of Anxiety and Fibromyalgia  Will hold off on sending for lactose intolerance as may have additional workup ordered by GI  Subjective:      Patient ID: Wyatt Edmondson is a 31 y o  female  HPI    Patient is a 30 yo female with anxiety currently on Buspar 5mg QD and recently weaned off of Cymbalta  Reports she eats cereal with milk in the morning and 10 minutes later has to run to the bathroom  Feels lightheaded and like she is going to pass out but does not lose consciousness and slowly lowered herself to the cold floor where she feels better  Patient states that her children family frequently find her lying on the floor after after using the bathroom  Patient reports that she can eat milk and cookies or an entire bowl of ice cream at nighttime but does not have similar symptoms, symptoms only occur like this in the morning  She has never been tested for milk/lactose intolerance  Denies constipation or diarrhea, blood in stool, nausea or vomiting, current abdominal pain  Hx of Cholecystectomy and bilateral Salpingectomy per patient       Has scheduled appointment with GI on 4/29/2022 and requires referral       The following portions of the patient's history were reviewed and updated as appropriate: allergies, current medications, past family history, past medical history, past social history, past surgical history and problem list     Review of Systems    Per HPI    Objective:      /86 (BP Location: Left arm, Patient Position: Sitting, Cuff Size: Standard)   Pulse 68   Temp 97 5 °F (36 4 °C) (Tympanic)   Resp 16   Ht 5' 2" (1 575 m)   Wt 60 9 kg (134 lb 3 2 oz)   SpO2 99%   BMI 24 55 kg/m²          Physical Exam  Constitutional:       Appearance: Normal appearance  Cardiovascular:      Rate and Rhythm: Normal rate and regular rhythm  Pulses: Normal pulses  Heart sounds: Normal heart sounds  Pulmonary:      Effort: Pulmonary effort is normal       Breath sounds: Normal breath sounds  Abdominal:      General: Bowel sounds are normal  There is no distension  Palpations: Abdomen is soft  Tenderness: There is no abdominal tenderness  There is no guarding or rebound  Comments: Mild cramping / discomfort on palpation of bilateral lower abdomen  Skin:     General: Skin is warm  Capillary Refill: Capillary refill takes less than 2 seconds  Neurological:      General: No focal deficit present  Mental Status: She is alert and oriented to person, place, and time     Psychiatric:      Comments: A little anxious

## 2022-04-29 ENCOUNTER — OFFICE VISIT (OUTPATIENT)
Dept: GASTROENTEROLOGY | Facility: CLINIC | Age: 37
End: 2022-04-29
Payer: COMMERCIAL

## 2022-04-29 VITALS
SYSTOLIC BLOOD PRESSURE: 131 MMHG | WEIGHT: 134.8 LBS | BODY MASS INDEX: 24.8 KG/M2 | TEMPERATURE: 98.3 F | HEART RATE: 80 BPM | HEIGHT: 62 IN | DIASTOLIC BLOOD PRESSURE: 96 MMHG

## 2022-04-29 DIAGNOSIS — R19.7 DIARRHEA, UNSPECIFIED TYPE: ICD-10-CM

## 2022-04-29 DIAGNOSIS — R11.2 NAUSEA AND VOMITING, UNSPECIFIED VOMITING TYPE: ICD-10-CM

## 2022-04-29 DIAGNOSIS — R10.30 LOWER ABDOMINAL PAIN: Primary | ICD-10-CM

## 2022-04-29 PROCEDURE — 3008F BODY MASS INDEX DOCD: CPT | Performed by: FAMILY MEDICINE

## 2022-04-29 PROCEDURE — 99204 OFFICE O/P NEW MOD 45 MIN: CPT | Performed by: INTERNAL MEDICINE

## 2022-04-29 NOTE — PROGRESS NOTES
Consultation - [phone]  Lucia Ayala 1985 female         Chief Complaint:  Abdominal pain    HPI:  31 old female history of anxiety, cholecystectomy reports having lower abdominal cramping pain in the morning after having coffee or cereal with milk  She has associated symptoms of nausea, vomiting and loose bowel movements  She also feels lightheaded and feeling of passing out  All the symptoms improved in few minutes once she vomits and moves her bowels  She denies any symptoms if she drinks milk or milk later in the day  Good appetite, no recent weight loss  Denies any heartburn acid reflux  Denies any difficulty swallowing  Chaperon: Cookie Canavan    REVIEW OF SYSTEMS: Review of Systems   Constitutional: Negative for activity change, appetite change, chills, diaphoresis, fatigue, fever and unexpected weight change  HENT: Negative for ear discharge, ear pain, facial swelling, hearing loss, nosebleeds, sore throat, tinnitus and voice change  Eyes: Negative for pain, discharge, redness, itching and visual disturbance  Respiratory: Negative for apnea, cough, chest tightness, shortness of breath and wheezing  Cardiovascular: Negative for chest pain and palpitations  Gastrointestinal:        As noted in HPI   Endocrine: Negative for cold intolerance, heat intolerance and polyuria  Genitourinary: Negative for difficulty urinating, dysuria, flank pain, hematuria and urgency  Musculoskeletal: Negative for arthralgias, back pain, gait problem, joint swelling and myalgias  Skin: Negative for rash and wound  Neurological: Negative for dizziness, tremors, seizures, speech difficulty, light-headedness, numbness and headaches  Hematological: Negative for adenopathy  Does not bruise/bleed easily  Psychiatric/Behavioral: Negative for agitation, behavioral problems and confusion  The patient is not nervous/anxious           Past Medical History:   Diagnosis Date  Anxiety 6/5/2018    Asthma     Psychiatric disorder     Varicella       Past Surgical History:   Procedure Laterality Date    CHOLECYSTECTOMY  2008    CONDYLOMA EXCISION/FULGURATION      SALPINGECTOMY Bilateral 7/3/2017    Procedure: SALPINGECTOMY;  Surgeon: Sharyn Michelle MD;  Location: [facility];  Service: Gynecology    TUBAL LIGATION       Social History     Socioeconomic History    Marital status: Single     Spouse name: Not on file    Number of children: Not on file    Years of education: Not on file    Highest education level: Not on file   Occupational History    Not on file   Tobacco Use    Smoking status: Never Smoker    Smokeless tobacco: Never Used   Vaping Use    Vaping Use: Never used   Substance and Sexual Activity    Alcohol use: No    Drug use: No    Sexual activity: Yes     Partners: Male     Comment: onset of intercourse 15 yrs of age and consentual / unprotected sex  / oral sex educated on STD and HPV  / lifetime partners-8  / current partner 1 yr 1 partner    Other Topics Concern    Not on file   Social History Narrative    Not on file     Social Determinants of Health     Financial Resource Strain: Not on file   Food Insecurity: Not on file   Transportation Needs: Not on file   Physical Activity: Not on file   Stress: Not on file   Social Connections: Not on file   Intimate Partner Violence: Not on file   Housing Stability: Not on file     Family History   Problem Relation Age of Onset    No Known Problems Mother     No Known Problems Father     No Known Problems Sister     No Known Problems Brother     No Known Problems Daughter     No Known Problems Son     Stroke Maternal Aunt     No Known Problems Maternal Uncle     No Known Problems Paternal Aunt     No Known Problems Paternal Uncle     Diabetes Maternal Grandmother     Heart disease Maternal Grandmother     No Known Problems Maternal Grandfather     No Known Problems Paternal Grandmother     No Known Problems Paternal Grandfather      Pollen extract  Current Outpatient Medications   Medication Sig Dispense Refill    busPIRone (BUSPAR) 5 mg tablet Take 1 tablet (5 mg total) by mouth 2 (two) times a day 60 tablet 1    bisacodyl (DULCOLAX) 5 mg EC tablet Take 2 tablets (10 mg total) by mouth once for 1 dose 2 tablet 0    polyethylene glycol (GOLYTELY) 4000 mL solution Take 4,000 mL by mouth once for 1 dose 4000 mL 0    simethicone (MYLICON) 80 mg chewable tablet Chew 1 tablet (80 mg total) every 6 (six) hours as needed for flatulence (Patient not taking: Reported on 2/14/2022 ) 30 tablet 0     No current facility-administered medications for this visit  Blood pressure 131/96, pulse 80, temperature 98 3 °F (36 8 °C), temperature source Temporal, height 5' 2" (1 575 m), weight 61 1 kg (134 lb 12 8 oz), not currently breastfeeding  PHYSICAL EXAM: Physical Exam  Constitutional:       Appearance: Normal appearance  She is well-developed  HENT:      Head: Normocephalic and atraumatic  Nose: Nose normal    Eyes:      Conjunctiva/sclera: Conjunctivae normal    Neck:      Thyroid: No thyromegaly  Vascular: No JVD  Trachea: No tracheal deviation  Cardiovascular:      Rate and Rhythm: Normal rate and regular rhythm  Heart sounds: Normal heart sounds  No murmur heard  No friction rub  No gallop  Pulmonary:      Effort: Pulmonary effort is normal  No respiratory distress  Breath sounds: Normal breath sounds  No wheezing or rales  Abdominal:      General: Bowel sounds are normal  There is no distension  Palpations: Abdomen is soft  There is no mass  Tenderness: There is no abdominal tenderness  There is no guarding  Hernia: No hernia is present  Musculoskeletal:         General: No tenderness or deformity  Cervical back: Neck supple  Right lower leg: No edema  Left lower leg: No edema  Lymphadenopathy:      Cervical: No cervical adenopathy  Skin:     General: Skin is warm and dry  Findings: No erythema or rash  Neurological:      Mental Status: She is alert and oriented to person, place, and time  Psychiatric:         Mood and Affect: Mood normal          Behavior: Behavior normal          Thought Content: Thought content normal           Lab Results   Component Value Date    WBC 7 9 04/12/2022    HGB 13 9 04/12/2022    HCT 40 1 04/12/2022    MCV 93 04/12/2022     04/12/2022     Lab Results   Component Value Date    K 4 2 08/17/2020    CO2 23 08/17/2020    CL 99 08/17/2020    BUN 9 08/17/2020    CREATININE 0 69 08/17/2020     Lab Results   Component Value Date    ALT 14 08/17/2020    AST 19 08/17/2020     No results found for: INR, PROTIME    Holter monitor - 48 hour    Result Date: 11/6/2020  Impression: 1  48 hour Holter monitor shows few PACs and PVCs  No significant tachy-mary arrhythmias  Corresponding to patient's symptoms of shortness of breath, palpitations no significant tachy-mary arrhythmia noted other than occasional PAC and PVCs  No separate diary was submitted  2  Rhythm was sinus throughout monitoring period  "This note has been constructed using a voice recognition system  Therefore there may be syntax, spelling, and/or grammatical errors  Please call if you have any questions  "      ASSESSMENT & PLAN:    Lower abdominal pain  Postprandial symptoms secondary to exaggerated gastrocolic reflex and functional from IBS  -explained to patient in detail about different possible etiologies and given reassurance  Discussed about the pathophysiology of IBS and the treatment plan     -discussed about the antispasmodics but patient reports that her symptoms improve in few minutes in    -low FODMAP    Diarrhea  Symptoms appear to most likely secondary to IBS rather than food allergies since she denies any his symptoms later in the day    Doubt for IBD    -check celiac disease panel and food allergy profile    -Schedule for both EGD and colonoscopy  -Patient was given instructions about the colonoscopy prep     -Patient was explained about  the risks and benefits of the procedure  Risks including but not limited to bleeding, infection, perforation were explained in detail  Also explained about less than 100% sensitivity with the exam and other alternatives            Nausea and vomiting  Functional nonulcer dyspepsia secondary to IBS    -treatment plan as described above

## 2022-04-29 NOTE — ASSESSMENT & PLAN NOTE
Symptoms appear to most likely secondary to IBS rather than food allergies since she denies any his symptoms later in the day  Doubt for IBD    -check celiac disease panel and food allergy profile    -Schedule for both EGD and colonoscopy  -Patient was given instructions about the colonoscopy prep     -Patient was explained about  the risks and benefits of the procedure  Risks including but not limited to bleeding, infection, perforation were explained in detail  Also explained about less than 100% sensitivity with the exam and other alternatives

## 2022-04-29 NOTE — PATIENT INSTRUCTIONS
Scheduled date of colonoscopy/EGD (as of today): 06/21/22  Physician performing colonoscopy: Felicia Claudio  Location of colonoscopy: [facility]  Bowel prep reviewed with patient: GOLYTELY  Instructions reviewed with patient by: VIRGINIA  Clearances: COVID TEST 06/15/22

## 2022-04-29 NOTE — ASSESSMENT & PLAN NOTE
Postprandial symptoms secondary to exaggerated gastrocolic reflex and functional from IBS  -explained to patient in detail about different possible etiologies and given reassurance    Discussed about the pathophysiology of IBS and the treatment plan     -discussed about the antispasmodics but patient reports that her symptoms improve in few minutes in    -low FODMAP

## 2022-05-02 RX ORDER — POLYETHYLENE GLYCOL 3350, SODIUM SULFATE ANHYDROUS, SODIUM BICARBONATE, SODIUM CHLORIDE, POTASSIUM CHLORIDE 236; 22.74; 6.74; 5.86; 2.97 G/4L; G/4L; G/4L; G/4L; G/4L
POWDER, FOR SOLUTION ORAL
Qty: 4000 ML | Refills: 0 | Status: SHIPPED | OUTPATIENT
Start: 2022-05-02 | End: 2022-06-20

## 2022-06-09 LAB
CODFISH IGE QN: <0.1 KU/L
COW MILK IGE QN: <0.1 KU/L
EGG WHITE IGE QN: <0.1 KU/L
ENDOMYSIUM IGA SER QL: NEGATIVE
GLIADIN PEPTIDE IGA SER-ACNC: 6 UNITS (ref 0–19)
GLIADIN PEPTIDE IGG SER-ACNC: 2 UNITS (ref 0–19)
IGA SERPL-MCNC: 331 MG/DL (ref 87–352)
Lab: NORMAL
PEANUT (RARA H) 6 IGE QN: <0.1 KU/L
SOYBEAN IGE QN: <0.1 KU/L
TTG IGA SER-ACNC: <2 U/ML (ref 0–3)
TTG IGG SER-ACNC: 3 U/ML (ref 0–5)
WHEAT IGE QN: <0.1 KU/L

## 2022-06-15 DIAGNOSIS — Z11.59 SCREENING FOR VIRAL DISEASE: ICD-10-CM

## 2022-06-15 PROCEDURE — U0005 INFEC AGEN DETEC AMPLI PROBE: HCPCS | Performed by: INTERNAL MEDICINE

## 2022-06-15 PROCEDURE — U0003 INFECTIOUS AGENT DETECTION BY NUCLEIC ACID (DNA OR RNA); SEVERE ACUTE RESPIRATORY SYNDROME CORONAVIRUS 2 (SARS-COV-2) (CORONAVIRUS DISEASE [COVID-19]), AMPLIFIED PROBE TECHNIQUE, MAKING USE OF HIGH THROUGHPUT TECHNOLOGIES AS DESCRIBED BY CMS-2020-01-R: HCPCS | Performed by: INTERNAL MEDICINE

## 2022-06-16 LAB — SARS-COV-2 RNA RESP QL NAA+PROBE: NEGATIVE

## 2022-06-20 ENCOUNTER — TELEPHONE (OUTPATIENT)
Dept: OTHER | Facility: OTHER | Age: 37
End: 2022-06-20

## 2022-06-20 DIAGNOSIS — R19.7 DIARRHEA, UNSPECIFIED TYPE: Primary | ICD-10-CM

## 2022-06-20 RX ORDER — SODIUM CHLORIDE, SODIUM LACTATE, POTASSIUM CHLORIDE, CALCIUM CHLORIDE 600; 310; 30; 20 MG/100ML; MG/100ML; MG/100ML; MG/100ML
125 INJECTION, SOLUTION INTRAVENOUS CONTINUOUS
Status: CANCELLED | OUTPATIENT
Start: 2022-06-20

## 2022-06-20 NOTE — TELEPHONE ENCOUNTER
Called and spoke to pharmacy  New prep sent    Called and spoke to patient and advised the pharmacy should be in touch for her to  prep when ready

## 2022-06-20 NOTE — TELEPHONE ENCOUNTER
Pharmacy is out of stock for PEG 3350-KCl-NaBcb-NaCl-NaSulf (PEG-3350/Electrolytes) 236 g SOLR, but they do have Gavolyte in stock  Will require a new prescription sent over  [facility] [address] [phone]  Patient is currently at the pharmacy

## 2022-06-21 ENCOUNTER — HOSPITAL ENCOUNTER (OUTPATIENT)
Dept: GASTROENTEROLOGY | Facility: AMBULARY SURGERY CENTER | Age: 37
Setting detail: OUTPATIENT SURGERY
Discharge: HOME | End: 2022-06-21
Attending: INTERNAL MEDICINE | Admitting: INTERNAL MEDICINE
Payer: COMMERCIAL

## 2022-06-21 ENCOUNTER — ANESTHESIA (OUTPATIENT)
Dept: GASTROENTEROLOGY | Facility: AMBULARY SURGERY CENTER | Age: 37
End: 2022-06-21

## 2022-06-21 ENCOUNTER — ANESTHESIA EVENT (OUTPATIENT)
Dept: GASTROENTEROLOGY | Facility: AMBULARY SURGERY CENTER | Age: 37
End: 2022-06-21

## 2022-06-21 VITALS
RESPIRATION RATE: 24 BRPM | HEIGHT: 62 IN | TEMPERATURE: 97.5 F | DIASTOLIC BLOOD PRESSURE: 60 MMHG | OXYGEN SATURATION: 100 % | HEART RATE: 72 BPM | BODY MASS INDEX: 24.66 KG/M2 | SYSTOLIC BLOOD PRESSURE: 96 MMHG | WEIGHT: 134 LBS

## 2022-06-21 DIAGNOSIS — R19.7 DIARRHEA, UNSPECIFIED TYPE: ICD-10-CM

## 2022-06-21 DIAGNOSIS — R11.2 NAUSEA AND VOMITING, UNSPECIFIED VOMITING TYPE: ICD-10-CM

## 2022-06-21 PROCEDURE — 45380 COLONOSCOPY AND BIOPSY: CPT | Performed by: INTERNAL MEDICINE

## 2022-06-21 PROCEDURE — 88305 TISSUE EXAM BY PATHOLOGIST: CPT | Performed by: SPECIALIST

## 2022-06-21 PROCEDURE — 45388 COLONOSCOPY W/ABLATION: CPT | Performed by: INTERNAL MEDICINE

## 2022-06-21 PROCEDURE — 43239 EGD BIOPSY SINGLE/MULTIPLE: CPT | Performed by: INTERNAL MEDICINE

## 2022-06-21 RX ORDER — ONDANSETRON 2 MG/ML
4 INJECTION INTRAMUSCULAR; INTRAVENOUS ONCE AS NEEDED
Status: DISCONTINUED | OUTPATIENT
Start: 2022-06-21 | End: 2022-06-25 | Stop reason: HOSPADM

## 2022-06-21 RX ORDER — LIDOCAINE HYDROCHLORIDE 10 MG/ML
INJECTION, SOLUTION EPIDURAL; INFILTRATION; INTRACAUDAL; PERINEURAL AS NEEDED
Status: DISCONTINUED | OUTPATIENT
Start: 2022-06-21 | End: 2022-06-21

## 2022-06-21 RX ORDER — SODIUM CHLORIDE, SODIUM LACTATE, POTASSIUM CHLORIDE, CALCIUM CHLORIDE 600; 310; 30; 20 MG/100ML; MG/100ML; MG/100ML; MG/100ML
125 INJECTION, SOLUTION INTRAVENOUS CONTINUOUS
Status: DISCONTINUED | OUTPATIENT
Start: 2022-06-21 | End: 2022-06-25 | Stop reason: HOSPADM

## 2022-06-21 RX ORDER — PROPOFOL 10 MG/ML
INJECTION, EMULSION INTRAVENOUS AS NEEDED
Status: DISCONTINUED | OUTPATIENT
Start: 2022-06-21 | End: 2022-06-21

## 2022-06-21 RX ADMIN — LIDOCAINE HYDROCHLORIDE 50 MG: 10 INJECTION, SOLUTION EPIDURAL; INFILTRATION; INTRACAUDAL at 09:14

## 2022-06-21 RX ADMIN — PROPOFOL 50 MG: 10 INJECTION, EMULSION INTRAVENOUS at 09:20

## 2022-06-21 RX ADMIN — PROPOFOL 20 MG: 10 INJECTION, EMULSION INTRAVENOUS at 09:33

## 2022-06-21 RX ADMIN — PROPOFOL 20 MG: 10 INJECTION, EMULSION INTRAVENOUS at 09:31

## 2022-06-21 RX ADMIN — PROPOFOL 100 MG: 10 INJECTION, EMULSION INTRAVENOUS at 09:17

## 2022-06-21 RX ADMIN — PROPOFOL 20 MG: 10 INJECTION, EMULSION INTRAVENOUS at 09:39

## 2022-06-21 RX ADMIN — PROPOFOL 20 MG: 10 INJECTION, EMULSION INTRAVENOUS at 09:25

## 2022-06-21 RX ADMIN — SODIUM CHLORIDE, SODIUM LACTATE, POTASSIUM CHLORIDE, AND CALCIUM CHLORIDE 125 ML/HR: .6; .31; .03; .02 INJECTION, SOLUTION INTRAVENOUS at 08:46

## 2022-06-21 RX ADMIN — PROPOFOL 40 MG: 10 INJECTION, EMULSION INTRAVENOUS at 09:23

## 2022-06-21 RX ADMIN — PROPOFOL 20 MG: 10 INJECTION, EMULSION INTRAVENOUS at 09:29

## 2022-06-21 RX ADMIN — PROPOFOL 20 MG: 10 INJECTION, EMULSION INTRAVENOUS at 09:35

## 2022-06-21 RX ADMIN — PROPOFOL 20 MG: 10 INJECTION, EMULSION INTRAVENOUS at 09:37

## 2022-06-21 RX ADMIN — PROPOFOL 20 MG: 10 INJECTION, EMULSION INTRAVENOUS at 09:27

## 2022-06-21 RX ADMIN — PROPOFOL 200 MG: 10 INJECTION, EMULSION INTRAVENOUS at 09:14

## 2022-06-21 NOTE — ANESTHESIA POSTPROCEDURE EVALUATION
Post-Op Assessment Note    CV Status:  Stable  Pain Score: 0    Pain management: adequate     Mental Status:  Sleepy   Hydration Status:  Euvolemic   PONV Controlled:  Controlled   Airway Patency:  Patent      Post Op Vitals Reviewed: Yes      Staff: CRNA         No complications documented      BP   86/54   Temp      Pulse 72   Resp 16   SpO2 98

## 2022-06-21 NOTE — ANESTHESIA PREPROCEDURE EVALUATION
Procedure:  COLONOSCOPY  EGD    Relevant Problems   ANESTHESIA (within normal limits)      CARDIO (within normal limits)      ENDO (within normal limits)      MUSCULOSKELETAL   (+) Fibromyalgia      NEURO/PSYCH   (+) Anxiety   (+) Fibromyalgia   (+) History of asthma      PULMONARY   (+) Mild intermittent asthma   (+) Shortness of breath        Physical Exam    Airway    Mallampati score: I  TM Distance: >3 FB  Neck ROM: full     Dental   No notable dental hx     Cardiovascular  Rhythm: regular, Rate: normal,     Pulmonary  Breath sounds clear to auscultation,     Other Findings        Anesthesia Plan  ASA Score- 2     Anesthesia Type- IV sedation with anesthesia with ASA Monitors  Additional Monitors:   Airway Plan:           Plan Factors-Exercise tolerance (METS): >4 METS  Chart reviewed  Existing labs reviewed  Patient summary reviewed  Patient is not a current smoker  Induction-     Postoperative Plan-     Informed Consent- Anesthetic plan and risks discussed with patient  I personally reviewed this patient with the CRNA  Discussed and agreed on the Anesthesia Plan with the CRNA  Torie Spear

## 2022-06-21 NOTE — H&P
History and Physical - [facility]  Karen Bailey 31 y o  female MRN: [id number]        HPI: 31 old female history of anxiety, cholecystectomy reports having lower abdominal cramping pain in the morning after having coffee or cereal with milk  She has associated symptoms of nausea, vomiting and loose bowel movements       Historical Information   Past Medical History:   Diagnosis Date    Abdominal cramping     Anxiety 06/05/2018    Asthma     COVID-19 01/2022    headache,loss of taste/smell    Diarrhea 2020    dairy-diarrhea,vomiting    PONV (postoperative nausea and vomiting)     Psychiatric disorder     Shortness of breath     Varicella      Past Surgical History:   Procedure Laterality Date    CHOLECYSTECTOMY  2008    CONDYLOMA EXCISION/FULGURATION      SALPINGECTOMY Bilateral 07/03/2017    Procedure: SALPINGECTOMY;  Surgeon: Narciso Rolon MD;  Location: [facility];  Service: Gynecology    TUBAL LIGATION  2017     Social History   Social History     Substance and Sexual Activity   Alcohol Use No     Social History     Substance and Sexual Activity   Drug Use No     Social History     Tobacco Use   Smoking Status Never Smoker   Smokeless Tobacco Never Used     Family History   Problem Relation Age of Onset    Other Mother         accidental overdose    Thyroid disease unspecified Mother     Alcohol abuse Father     Cancer Father         liver cancer    No Known Problems Sister     No Known Problems Brother     No Known Problems Daughter     No Known Problems Son     Diabetes Maternal Grandmother     Heart disease Maternal Grandmother     No Known Problems Maternal Grandfather     No Known Problems Paternal Grandmother     No Known Problems Paternal Grandfather     Stroke Maternal Aunt     No Known Problems Maternal Uncle     No Known Problems Paternal Aunt     No Known Problems Paternal Uncle        Meds/Allergies     (Not in a hospital admission)      Allergies   Allergen Reactions    Pollen Extract Allergic Rhinitis       Objective     Blood pressure 125/82, pulse 56, temperature 97 5 °F (36 4 °C), temperature source Temporal, resp  rate 18, height 5' 2" (1 575 m), weight 60 8 kg (134 lb), last menstrual period 05/23/2022, SpO2 100 %, not currently breastfeeding      PHYSICAL EXAM:    Gen: NAD  CV: S1 & S2 normal, RRR  CHEST: Clear to auscultate  ABD: soft, NT/ND, good bowel sounds  EXT: no edema    ASSESSMENT:     Nausea, vomiting, loose bowel movements    PLAN:    EGD and colonoscopy

## 2022-07-11 ENCOUNTER — TELEPHONE (OUTPATIENT)
Dept: OTHER | Facility: OTHER | Age: 37
End: 2022-07-11

## 2022-07-11 ENCOUNTER — TELEPHONE (OUTPATIENT)
Dept: GASTROENTEROLOGY | Facility: AMBULARY SURGERY CENTER | Age: 37
End: 2022-07-11

## 2022-07-11 NOTE — TELEPHONE ENCOUNTER
----- Message from Ying Duran sent at 7/11/2022  1:22 PM EDT -----    ----- Message -----  From: Rj Valentin MD  Sent: 7/8/2022   4:46 PM EDT  To: [facility]    Gastric biopsies and colon biopsies are benign  Large polyp removed came back as pre cancerous tubular adenoma  Repeat colonoscopy in 3 years      Office visit with PA in couple of months

## 2022-07-31 NOTE — PROGRESS NOTES
Assessment and Plan:   Ms Darrel Pal is a 24-year-old female with history significant for anxiety who presents for an evaluation of chest tightness  She is referred by Dr Kriss Hodge for a rheumatology consult  - [facility] presents today for an evaluation of torso tightness that she has been experiencing constantly over the past 2 years  She does not report any additional complaints that requires an evaluation  I advised her based on these symptoms I would be less concerned for an underlying autoimmune disease and she does not require a serological workup  The etiology of her symptoms is unclear but I question if there may be a component of underlying anxiety  I would recommend a conservative management approach with a trial of muscle relaxants as needed and also considering either massage therapy or acupuncture  Plan:  Diagnoses and all orders for this visit:    Muscle tightness  -     cyclobenzaprine (FLEXERIL) 10 mg tablet; Take 1 tablet (10 mg total) by mouth 2 (two) times a day as needed for muscle spasms    Shortness of breath  -     XR chest pa & lateral; Future    Other orders  -     polyethylene glycol-electrolytes (NULYTELY) 4000 mL solution; TAKE 4,000 ML BY MOUTH ONCE FOR 1 DOSE AS DIRECTED      I have personally reviewed pertinent films in PACS of the CXR which is normal        Activities as tolerated  Exercise: try to maintain a low impact exercise regimen as much as possible  Continue other medications as prescribed by PCP and other specialists  RTC PRN  HPI  Ms Darrel Pal is a 24-year-old female with history significant for anxiety who presents for an evaluation of chest tightness  She is referred by Dr Kriss Hodge for a rheumatology consult        Patient reports over the past 2 years she has had constant symptoms from the time she wakes up in the morning until bedtime of experiencing a sensation of chest tightness which she feels deep and internally that contributes to difficulty with taking a full, deep breath  She reports a lot of muscle tension in her neck, shoulders and chest area that can radiate through to her back  This will also lead to tension headaches  Her symptoms are usually worse at nighttime but typically she can sleep well through the night  The symptoms are not reproducible and there is no chest tenderness  She has seen multiple specialists for her symptoms including Cardiology, pulmonology and Gastroenterology without a clear diagnosis  She has tried self measures as well as use of over-the-counter pain medications/antacids which have been ineffective  She does not report any additional complaints such as peripheral joint pains, swelling, stiffness, significant dry eyes/dry mouth, inflammatory eye disease, skin rash, psoriasis, inflammatory bowel disease or a family history of autoimmune disease  Testing done so far showed a normal ANGELO screen, ESR, CRP, celiac antibody profile and chest x-ray  The following portions of the patient's history were reviewed and updated as appropriate: allergies, current medications, past family history, past medical history, past social history, past surgical history and problem list       Review of Systems  Constitutional: Negative for weight change, fevers, chills, night sweats, fatigue  ENT/Mouth: Negative for hearing changes, ear pain, nasal congestion, sinus pain, hoarseness, sore throat, rhinorrhea, swallowing difficulty  Eyes: Negative for pain, redness, discharge, vision changes  Gastrointestinal: Negative for nausea, vomiting, diarrhea, constipation, pain, heartburn  Genitourinary: Negative for dysuria, urinary frequency, hematuria  Musculoskeletal: As per HPI  Skin: Negative for skin rash, color changes  Neuro: Negative for weakness, numbness, tingling, loss of consciousness  Psych: Negative for depression  Positive for anxiety  Heme/Lymph: Negative for easy bruising, bleeding, lymphadenopathy  Past Medical History:   Diagnosis Date    Abdominal cramping     Anxiety 06/05/2018    Asthma     COVID-19 01/2022    headache,loss of taste/smell    Diarrhea 2020    dairy-diarrhea,vomiting    PONV (postoperative nausea and vomiting)     Psychiatric disorder     Shortness of breath     Varicella        Past Surgical History:   Procedure Laterality Date    CHOLECYSTECTOMY  2008    CONDYLOMA EXCISION/FULGURATION      SALPINGECTOMY Bilateral 07/03/2017    Procedure: SALPINGECTOMY;  Surgeon: Piedad Alvarado MD;  Location: [address] [place] [address];  Service: Gynecology    TUBAL LIGATION  2017       Social History     Socioeconomic History    Marital status: Single     Spouse name: Not on file    Number of children: Not on file    Years of education: Not on file    Highest education level: Not on file   Occupational History    Not on file   Tobacco Use    Smoking status: Never Smoker    Smokeless tobacco: Never Used   Vaping Use    Vaping Use: Never used   Substance and Sexual Activity    Alcohol use: No    Drug use: No    Sexual activity: Yes     Partners: Male     Birth control/protection: Female Sterilization     Comment: onset of intercourse 17 yrs of age and consentual / unprotected sex  / oral sex educated on STD and HPV  / lifetime partners-8  / current partner 1 yr 1 partner    Other Topics Concern    Not on file   Social History Narrative    Not on file     Social Determinants of Health     Financial Resource Strain: Not on file   Food Insecurity: Not on file   Transportation Needs: Not on file   Physical Activity: Not on file   Stress: Not on file   Social Connections: Not on file   Intimate Partner Violence: Not on file   Housing Stability: Not on file       Family History   Problem Relation Age of Onset    Other Mother         accidental overdose    Thyroid disease unspecified Mother     Alcohol abuse Father     Cancer Father         liver cancer    No Known Problems Sister     No Known Problems Brother     No Known Problems Daughter     No Known Problems Son     Diabetes Maternal Grandmother     Heart disease Maternal Grandmother     No Known Problems Maternal Grandfather     No Known Problems Paternal Grandmother     No Known Problems Paternal Grandfather     Stroke Maternal Aunt     No Known Problems Maternal Uncle     No Known Problems Paternal Aunt     No Known Problems Paternal Uncle        Allergies   Allergen Reactions    Pollen Extract Allergic Rhinitis       Current Outpatient Medications:     cyclobenzaprine (FLEXERIL) 10 mg tablet, Take 1 tablet (10 mg total) by mouth 2 (two) times a day as needed for muscle spasms, Disp: 20 tablet, Rfl: 0    bisacodyl (DULCOLAX) 5 mg EC tablet, Take 2 tablets (10 mg total) by mouth once for 1 dose, Disp: 2 tablet, Rfl: 0    busPIRone (BUSPAR) 5 mg tablet, Take 1 tablet (5 mg total) by mouth 2 (two) times a day, Disp: 60 tablet, Rfl: 1    Melatonin 5 MG TABS, Take by mouth daily at bedtime as needed, Disp: , Rfl:     polyethylene glycol (COLYTE) 4000 mL solution, Take 4,000 mL by mouth once for 1 dose, Disp: 4000 mL, Rfl: 0    polyethylene glycol-electrolytes (NULYTELY) 4000 mL solution, TAKE 4,000 ML BY MOUTH ONCE FOR 1 DOSE AS DIRECTED, Disp: , Rfl:       Objective:    Vitals:    08/01/22 0851   BP: 142/90   Pulse: 64   Temp: 98 4 °F (36 9 °C)   Weight: 59 7 kg (131 lb 9 6 oz)       Physical Exam  General: Well appearing, well nourished, in no distress  Oriented x 3, normal mood and affect  Ambulating without difficulty  Skin: Good turgor, no rash, unusual bruising or prominent lesions  Hair: Normal texture and distribution  Nails: Normal color, no deformities  HEENT:  Head: Normocephalic, atraumatic  Eyes: Conjunctiva clear, sclera non-icteric, EOM intact  Extremities: No amputations or deformities, cyanosis, edema  Musculoskeletal:  No swelling visualized  Neurologic: Alert and oriented   No focal neurological deficits appreciated  Psychiatric: Normal mood and affect  LALITA De Paz    Rheumatology

## 2022-08-01 ENCOUNTER — OFFICE VISIT (OUTPATIENT)
Dept: RHEUMATOLOGY | Facility: CLINIC | Age: 37
End: 2022-08-01
Payer: COMMERCIAL

## 2022-08-01 VITALS
WEIGHT: 131.6 LBS | TEMPERATURE: 98.4 F | SYSTOLIC BLOOD PRESSURE: 142 MMHG | DIASTOLIC BLOOD PRESSURE: 90 MMHG | HEART RATE: 64 BPM | BODY MASS INDEX: 24.07 KG/M2

## 2022-08-01 DIAGNOSIS — M62.89 MUSCLE TIGHTNESS: Primary | ICD-10-CM

## 2022-08-01 DIAGNOSIS — R06.02 SHORTNESS OF BREATH: ICD-10-CM

## 2022-08-01 PROCEDURE — 99205 OFFICE O/P NEW HI 60 MIN: CPT | Performed by: INTERNAL MEDICINE

## 2022-08-01 RX ORDER — CYCLOBENZAPRINE HCL 10 MG
10 TABLET ORAL 2 TIMES DAILY PRN
Qty: 20 TABLET | Refills: 0 | Status: SHIPPED | OUTPATIENT
Start: 2022-08-01 | End: 2022-10-26 | Stop reason: SDUPTHER

## 2022-08-01 RX ORDER — POLYETHYLENE GLYCOL 3350, SODIUM CHLORIDE, SODIUM BICARBONATE, POTASSIUM CHLORIDE 420; 11.2; 5.72; 1.48 G/4L; G/4L; G/4L; G/4L
POWDER, FOR SOLUTION ORAL
COMMUNITY
Start: 2022-06-20 | End: 2022-10-26 | Stop reason: ALTCHOICE

## 2022-08-01 NOTE — LETTER
August 1, 2022     Ivan Omalley DO  [address]  [facility] [address]    Patient: Herbie Lombardo   YOB: 1985   Date of Visit: 8/1/2022       Dear Dr Basilia Cruz:    Thank you for referring Faina Nunes to me for evaluation  Below are my notes for this consultation  If you have questions, please do not hesitate to call me  I look forward to following your patient along with you  Sincerely,        Queenie Matson MD        CC: No Recipients  Queenie Matson MD  8/1/2022  9:41 AM  Sign when Signing Visit  Assessment and Plan:   Ms Kyle Dubin is a 19-year-old female with history significant for anxiety who presents for an evaluation of chest tightness  She is referred by Dr Basilia Cruz for a rheumatology consult  - [facility] presents today for an evaluation of torso tightness that she has been experiencing constantly over the past 2 years  She does not report any additional complaints that requires an evaluation  I advised her based on these symptoms I would be less concerned for an underlying autoimmune disease and she does not require a serological workup  The etiology of her symptoms is unclear but I question if there may be a component of underlying anxiety  I would recommend a conservative management approach with a trial of muscle relaxants as needed and also considering either massage therapy or acupuncture  Plan:  Diagnoses and all orders for this visit:    Muscle tightness  -     cyclobenzaprine (FLEXERIL) 10 mg tablet; Take 1 tablet (10 mg total) by mouth 2 (two) times a day as needed for muscle spasms    Shortness of breath  -     XR chest pa & lateral; Future    Other orders  -     polyethylene glycol-electrolytes (NULYTELY) 4000 mL solution; TAKE 4,000 ML BY MOUTH ONCE FOR 1 DOSE AS DIRECTED      I have personally reviewed pertinent films in PACS of the CXR which is normal        Activities as tolerated     Exercise: try to maintain a low impact exercise regimen as much as possible  Continue other medications as prescribed by PCP and other specialists  RTC PRN  HPI  Ms Ana Reyes is a 79-year-old female with history significant for anxiety who presents for an evaluation of chest tightness  She is referred by Dr Kel Edmonds for a rheumatology consult  Patient reports over the past 2 years she has had constant symptoms from the time she wakes up in the morning until bedtime of experiencing a sensation of chest tightness which she feels deep and internally that contributes to difficulty with taking a full, deep breath  She reports a lot of muscle tension in her neck, shoulders and chest area that can radiate through to her back  This will also lead to tension headaches  Her symptoms are usually worse at nighttime but typically she can sleep well through the night  The symptoms are not reproducible and there is no chest tenderness  She has seen multiple specialists for her symptoms including Cardiology, pulmonology and Gastroenterology without a clear diagnosis  She has tried self measures as well as use of over-the-counter pain medications/antacids which have been ineffective  She does not report any additional complaints such as peripheral joint pains, swelling, stiffness, significant dry eyes/dry mouth, inflammatory eye disease, skin rash, psoriasis, inflammatory bowel disease or a family history of autoimmune disease  Testing done so far showed a normal ANGELO screen, ESR, CRP, celiac antibody profile and chest x-ray  The following portions of the patient's history were reviewed and updated as appropriate: allergies, current medications, past family history, past medical history, past social history, past surgical history and problem list       Review of Systems  Constitutional: Negative for weight change, fevers, chills, night sweats, fatigue    ENT/Mouth: Negative for hearing changes, ear pain, nasal congestion, sinus pain, hoarseness, sore throat, rhinorrhea, swallowing difficulty  Eyes: Negative for pain, redness, discharge, vision changes  Gastrointestinal: Negative for nausea, vomiting, diarrhea, constipation, pain, heartburn  Genitourinary: Negative for dysuria, urinary frequency, hematuria  Musculoskeletal: As per HPI  Skin: Negative for skin rash, color changes  Neuro: Negative for weakness, numbness, tingling, loss of consciousness  Psych: Negative for depression  Positive for anxiety  Heme/Lymph: Negative for easy bruising, bleeding, lymphadenopathy          Past Medical History:   Diagnosis Date    Abdominal cramping     Anxiety 06/05/2018    Asthma     COVID-19 01/2022    headache,loss of taste/smell    Diarrhea 2020    dairy-diarrhea,vomiting    PONV (postoperative nausea and vomiting)     Psychiatric disorder     Shortness of breath     Varicella        Past Surgical History:   Procedure Laterality Date    CHOLECYSTECTOMY  2008    CONDYLOMA EXCISION/FULGURATION      SALPINGECTOMY Bilateral 07/03/2017    Procedure: SALPINGECTOMY;  Surgeon: Tomas Cabot, MD;  Location: [address] [place] [address];  Service: Gynecology    TUBAL LIGATION  2017       Social History     Socioeconomic History    Marital status: Single     Spouse name: Not on file    Number of children: Not on file    Years of education: Not on file    Highest education level: Not on file   Occupational History    Not on file   Tobacco Use    Smoking status: Never Smoker    Smokeless tobacco: Never Used   Vaping Use    Vaping Use: Never used   Substance and Sexual Activity    Alcohol use: No    Drug use: No    Sexual activity: Yes     Partners: Male     Birth control/protection: Female Sterilization     Comment: onset of intercourse 15 yrs of age and consentual / unprotected sex  / oral sex educated on STD and HPV  / lifetime partners-8  / current partner 1 yr 1 partner    Other Topics Concern    Not on file   Social History Narrative    Not on file     Social Determinants of Health     Financial Resource Strain: Not on file   Food Insecurity: Not on file   Transportation Needs: Not on file   Physical Activity: Not on file   Stress: Not on file   Social Connections: Not on file   Intimate Partner Violence: Not on file   Housing Stability: Not on file       Family History   Problem Relation Age of Onset    Other Mother         accidental overdose    Thyroid disease unspecified Mother     Alcohol abuse Father     Cancer Father         liver cancer    No Known Problems Sister     No Known Problems Brother     No Known Problems Daughter     No Known Problems Son     Diabetes Maternal Grandmother     Heart disease Maternal Grandmother     No Known Problems Maternal Grandfather     No Known Problems Paternal Grandmother     No Known Problems Paternal Grandfather     Stroke Maternal Aunt     No Known Problems Maternal Uncle     No Known Problems Paternal Aunt     No Known Problems Paternal Uncle        Allergies   Allergen Reactions    Pollen Extract Allergic Rhinitis       Current Outpatient Medications:     cyclobenzaprine (FLEXERIL) 10 mg tablet, Take 1 tablet (10 mg total) by mouth 2 (two) times a day as needed for muscle spasms, Disp: 20 tablet, Rfl: 0    bisacodyl (DULCOLAX) 5 mg EC tablet, Take 2 tablets (10 mg total) by mouth once for 1 dose, Disp: 2 tablet, Rfl: 0    busPIRone (BUSPAR) 5 mg tablet, Take 1 tablet (5 mg total) by mouth 2 (two) times a day, Disp: 60 tablet, Rfl: 1    Melatonin 5 MG TABS, Take by mouth daily at bedtime as needed, Disp: , Rfl:     polyethylene glycol (COLYTE) 4000 mL solution, Take 4,000 mL by mouth once for 1 dose, Disp: 4000 mL, Rfl: 0    polyethylene glycol-electrolytes (NULYTELY) 4000 mL solution, TAKE 4,000 ML BY MOUTH ONCE FOR 1 DOSE AS DIRECTED, Disp: , Rfl:       Objective:    Vitals:    08/01/22 0851   BP: 142/90   Pulse: 64   Temp: 98 4 °F (36 9 °C)   Weight: 59 7 kg (131 lb 9 6 oz) Physical Exam  General: Well appearing, well nourished, in no distress  Oriented x 3, normal mood and affect  Ambulating without difficulty  Skin: Good turgor, no rash, unusual bruising or prominent lesions  Hair: Normal texture and distribution  Nails: Normal color, no deformities  HEENT:  Head: Normocephalic, atraumatic  Eyes: Conjunctiva clear, sclera non-icteric, EOM intact  Extremities: No amputations or deformities, cyanosis, edema  Musculoskeletal:  No swelling visualized  Neurologic: Alert and oriented  No focal neurological deficits appreciated  Psychiatric: Normal mood and affect  LALITA Hdez    Rheumatology

## 2022-08-08 ENCOUNTER — HOSPITAL ENCOUNTER (OUTPATIENT)
Dept: RADIOLOGY | Facility: HOSPITAL | Age: 37
Discharge: HOME | End: 2022-08-08
Payer: COMMERCIAL

## 2022-08-08 DIAGNOSIS — R06.02 SHORTNESS OF BREATH: ICD-10-CM

## 2022-08-08 PROCEDURE — 71046 X-RAY EXAM CHEST 2 VIEWS: CPT

## 2022-10-25 PROBLEM — R11.2 NAUSEA AND VOMITING: Status: RESOLVED | Noted: 2022-04-29 | Resolved: 2022-10-25

## 2022-10-25 PROBLEM — R06.02 SHORTNESS OF BREATH: Status: RESOLVED | Noted: 2022-04-10 | Resolved: 2022-10-25

## 2022-10-25 PROBLEM — R19.7 DIARRHEA: Status: RESOLVED | Noted: 2022-04-29 | Resolved: 2022-10-25

## 2022-10-25 PROBLEM — R00.2 PALPITATIONS: Status: RESOLVED | Noted: 2020-09-30 | Resolved: 2022-10-25

## 2022-10-25 PROBLEM — Z20.822 CLOSE EXPOSURE TO COVID-19 VIRUS: Status: RESOLVED | Noted: 2020-12-10 | Resolved: 2022-10-25

## 2022-10-26 ENCOUNTER — OFFICE VISIT (OUTPATIENT)
Dept: FAMILY MEDICINE CLINIC | Facility: CLINIC | Age: 37
End: 2022-10-26
Payer: COMMERCIAL

## 2022-10-26 DIAGNOSIS — F41.9 ANXIETY: ICD-10-CM

## 2022-10-26 DIAGNOSIS — Z00.00 ANNUAL PHYSICAL EXAM: Primary | ICD-10-CM

## 2022-10-26 DIAGNOSIS — Z11.59 NEED FOR HEPATITIS C SCREENING TEST: ICD-10-CM

## 2022-10-26 DIAGNOSIS — M62.89 MUSCLE TIGHTNESS: ICD-10-CM

## 2022-10-26 PROBLEM — M79.7 FIBROMYALGIA: Status: RESOLVED | Noted: 2020-12-29 | Resolved: 2022-10-26

## 2022-10-26 PROBLEM — J45.20 MILD INTERMITTENT ASTHMA: Status: RESOLVED | Noted: 2022-04-10 | Resolved: 2022-10-26

## 2022-10-26 PROBLEM — Z87.09 HISTORY OF ASTHMA: Status: RESOLVED | Noted: 2020-04-03 | Resolved: 2022-10-26

## 2022-10-26 PROBLEM — R10.30 LOWER ABDOMINAL PAIN: Status: RESOLVED | Noted: 2022-04-29 | Resolved: 2022-10-26

## 2022-10-26 PROCEDURE — 99395 PREV VISIT EST AGE 18-39: CPT | Performed by: FAMILY MEDICINE

## 2022-10-26 RX ORDER — CYCLOBENZAPRINE HCL 10 MG
10 TABLET ORAL 2 TIMES DAILY PRN
Qty: 60 TABLET | Refills: 0 | Status: SHIPPED | OUTPATIENT
Start: 2022-10-26

## 2022-10-26 NOTE — PROGRESS NOTES
Assessment/Plan:    Diagnoses and all orders for this visit:    Annual physical exam    Muscle tightness  -     cyclobenzaprine (FLEXERIL) 10 mg tablet; Take 1 tablet (10 mg total) by mouth 2 (two) times a day as needed for muscle spasms  -     TSH, 3rd generation with Free T4 reflex; Future  -     Comprehensive metabolic panel; Future  -     Basic metabolic panel; Future  -     TSH, 3rd generation with Free T4 reflex  -     Comprehensive metabolic panel  -     Basic metabolic panel    Anxiety  -     TSH, 3rd generation with Free T4 reflex; Future  -     Comprehensive metabolic panel; Future  -     Basic metabolic panel; Future  -     TSH, 3rd generation with Free T4 reflex  -     Comprehensive metabolic panel  -     Basic metabolic panel    Need for hepatitis C screening test  -     Hepatitis C Antibody ([facility]); Future  -     Hepatitis C Antibody ([facility])      No suicidal homicidal ideations  Spoke to the patient briefly about on different techniques of managing anxiety  Patient would like to hold off on any medications at the moment  Laboratory evaluation as noted above    Return in about 1 year (around 10/26/2023) for Annual     Subjective:   61-year-old female with anxiety who presented to the clinic today for her annual exam origin  Patient reported that anxiety is usually controlled with Flexeril  She reported most common concern is muscle tension  She denied any suicidal homicidal ideations  The following portions of the patient's history were reviewed and updated as appropriate: allergies, current medications, past family history, past medical history, past social history, past surgical history and problem list     Review of Systems   All other systems reviewed and are negative      Objective:  /74 (BP Location: Left arm, Patient Position: Sitting, Cuff Size: Adult)   Pulse 87   Temp 97 7 °F (36 5 °C) (Tympanic)   Resp 17   Ht 5' 2" (1 575 m)   Wt 59 8 kg (131 lb 12 8 oz) LMP 10/05/2022 (Within Days)   SpO2 98%   BMI 24 11 kg/m²     Physical Exam  Vitals reviewed  Constitutional:       General: She is not in acute distress  Appearance: She is well-developed  She is not diaphoretic  HENT:      Head: Normocephalic and atraumatic  Nose: Nose normal    Eyes:      Conjunctiva/sclera: Conjunctivae normal       Pupils: Pupils are equal, round, and reactive to light  Cardiovascular:      Rate and Rhythm: Normal rate and regular rhythm  Heart sounds: Normal heart sounds  No murmur heard  No friction rub  No gallop  Pulmonary:      Effort: Pulmonary effort is normal  No respiratory distress  Breath sounds: Normal breath sounds  No wheezing or rales  Abdominal:      General: Bowel sounds are normal  There is no distension  Palpations: Abdomen is soft  Tenderness: There is no abdominal tenderness  Musculoskeletal:         General: Normal range of motion  Cervical back: Normal range of motion and neck supple  Skin:     General: Skin is warm and dry  Findings: No erythema or rash  Neurological:      General: No focal deficit present  Mental Status: She is alert and oriented to person, place, and time     Psychiatric:         Mood and Affect: Mood normal        Rhonda Loya  10/27/22  10:18 AM

## 2022-10-27 ENCOUNTER — TELEPHONE (OUTPATIENT)
Dept: FAMILY MEDICINE CLINIC | Facility: CLINIC | Age: 37
End: 2022-10-27

## 2022-10-27 VITALS
SYSTOLIC BLOOD PRESSURE: 120 MMHG | WEIGHT: 131.8 LBS | DIASTOLIC BLOOD PRESSURE: 74 MMHG | HEIGHT: 62 IN | TEMPERATURE: 97.7 F | HEART RATE: 87 BPM | OXYGEN SATURATION: 98 % | RESPIRATION RATE: 17 BRPM | BODY MASS INDEX: 24.25 KG/M2

## 2023-05-02 ENCOUNTER — OFFICE VISIT (OUTPATIENT)
Dept: URGENT CARE | Facility: CLINIC | Age: 38
End: 2023-05-02

## 2023-05-02 VITALS
BODY MASS INDEX: 24.92 KG/M2 | DIASTOLIC BLOOD PRESSURE: 92 MMHG | TEMPERATURE: 98.9 F | SYSTOLIC BLOOD PRESSURE: 128 MMHG | HEIGHT: 62 IN | HEART RATE: 82 BPM | RESPIRATION RATE: 18 BRPM | WEIGHT: 135.4 LBS | OXYGEN SATURATION: 99 %

## 2023-05-02 DIAGNOSIS — R10.13 EPIGASTRIC PAIN: Primary | ICD-10-CM

## 2023-05-02 NOTE — PROGRESS NOTES
"  [facility]        NAME: Golden Cabot is a 30 y o  female  : 1985    MRN: [id number]  DATE: May 2, 2023  TIME: 6:54 PM    Assessment and Plan   Epigastric pain [R10 13]  1  Epigastric pain              Patient Instructions     Patient Instructions   Discussed symptoms seem to be exacerbation of chronic ongoing problem  Encourage following up with gastroenterology for further evaluation and management  All patient's questions answered and is agreeable with this plan  Follow up with PCP in 3-5 days  Proceed to  ER if symptoms worsen  Chief Complaint     Chief Complaint   Patient presents with    Abdominal Pain     Has been having diffuse upper abdominal pain x 3 years  Worse x past 2 weeks  Pain constant but worse after eating  Feels nauseated and bloated  Taking Gas-X  States \"feels like something pushing on my esophagus\"  History of Present Illness       Patient is a 79-year-old female presenting today with epigastric abdominal pain x3 years  Patient notes over the last few years she has been experiencing persistent upper abdominal pressure/discomfort, has had this issue evaluated by gastroenterology, has had several test performed including endoscopy, colonoscopy and ultrasound  Patient notes she has past surgical history of cholecystectomy  Notes that she has not had a diagnosis of what her discomfort truly is, was told it may be related to known anxiety  States that over the last few weeks the pain and discomfort seems to be worse than usual   Notes some associated nausea with the discomfort but denies any vomiting  Denies trouble swallowing, chest tightness, SOB, diarrhea  Review of Systems   Review of Systems   Constitutional: Negative for chills and fever  HENT: Negative for ear pain and sore throat  Eyes: Negative for pain and visual disturbance  Respiratory: Negative for cough and shortness of breath      Cardiovascular: Negative for chest pain and " palpitations  Gastrointestinal: Positive for abdominal pain and nausea  Negative for vomiting  Genitourinary: Negative for dysuria and hematuria  Musculoskeletal: Negative for arthralgias and back pain  Skin: Negative for color change and rash  Neurological: Negative for seizures and syncope  All other systems reviewed and are negative          Current Medications       Current Outpatient Medications:     cyclobenzaprine (FLEXERIL) 10 mg tablet, Take 1 tablet (10 mg total) by mouth 2 (two) times a day as needed for muscle spasms, Disp: 60 tablet, Rfl: 0    Probiotic Product (PROBIOTIC DAILY PO), Take by mouth as needed, Disp: , Rfl:     Simethicone (GAS-X PO), Take by mouth if needed, Disp: , Rfl:     Current Allergies     Allergies as of 05/02/2023 - Reviewed 05/02/2023   Allergen Reaction Noted    Pollen extract Allergic Rhinitis 03/23/2016            The following portions of the patient's history were reviewed and updated as appropriate: allergies, current medications, past family history, past medical history, past social history, past surgical history and problem list      Past Medical History:   Diagnosis Date    Abdominal cramping     Anxiety 06/05/2018    Asthma     COVID-19 01/2022    headache,loss of taste/smell    Diarrhea 2020    dairy-diarrhea,vomiting    PONV (postoperative nausea and vomiting)     Psychiatric disorder     Shortness of breath     Varicella        Past Surgical History:   Procedure Laterality Date    CHOLECYSTECTOMY  2008    CONDYLOMA EXCISION/FULGURATION      SALPINGECTOMY Bilateral 07/03/2017    Procedure: SALPINGECTOMY;  Surgeon: uHi Groves MD;  Location: [facility];  Service: Gynecology    TUBAL LIGATION  2017       Family History   Problem Relation Age of Onset    Other Mother         accidental overdose    Thyroid disease unspecified Mother     Substance Abuse Mother     Depression Mother     Bipolar disorder Mother     Drug abuse Mother    Beka Rodriguez "Alcohol abuse Father     Cancer Father         liver cancer    No Known Problems Sister     No Known Problems Brother     No Known Problems Daughter     No Known Problems Son     Diabetes Maternal Grandmother     Heart disease Maternal Grandmother     No Known Problems Maternal Grandfather     No Known Problems Paternal Grandmother     No Known Problems Paternal Grandfather     Stroke Maternal Aunt     No Known Problems Maternal Uncle     No Known Problems Paternal Aunt     No Known Problems Paternal Uncle          Medications have been verified  Objective   /92   Pulse 82   Temp 98 9 °F (37 2 °C)   Resp 18   Ht 5' 2\" (1 575 m)   Wt 61 4 kg (135 lb 6 4 oz)   LMP 04/05/2023 (Approximate)   SpO2 99%   BMI 24 76 kg/m²        Physical Exam     Physical Exam  Vitals and nursing note reviewed  Constitutional:       General: She is not in acute distress  Appearance: Normal appearance  HENT:      Head: Normocephalic  Right Ear: Tympanic membrane, ear canal and external ear normal       Left Ear: Tympanic membrane, ear canal and external ear normal       Nose: Nose normal       Mouth/Throat:      Mouth: Mucous membranes are moist       Pharynx: Oropharynx is clear  Eyes:      Conjunctiva/sclera: Conjunctivae normal    Cardiovascular:      Rate and Rhythm: Normal rate and regular rhythm  Pulses: Normal pulses  Heart sounds: Normal heart sounds  Pulmonary:      Effort: Pulmonary effort is normal       Breath sounds: Normal breath sounds  Abdominal:      General: Abdomen is flat  Bowel sounds are normal       Palpations: Abdomen is soft  Tenderness: There is abdominal tenderness (epigastric)  There is no guarding or rebound  Musculoskeletal:      Cervical back: Normal range of motion  Skin:     General: Skin is warm  Capillary Refill: Capillary refill takes less than 2 seconds  Neurological:      Mental Status: She is alert                     "

## 2023-05-02 NOTE — PATIENT INSTRUCTIONS
Discussed symptoms seem to be exacerbation of chronic ongoing problem  Encourage following up with gastroenterology for further evaluation and management  All patient's questions answered and is agreeable with this plan

## 2023-05-26 LAB
ALBUMIN SERPL-MCNC: 4.8 G/DL (ref 3.8–4.8)
ALBUMIN/GLOB SERPL: 2 {RATIO} (ref 1.2–2.2)
ALP SERPL-CCNC: 79 IU/L (ref 44–121)
ALT SERPL-CCNC: 15 IU/L (ref 0–32)
AST SERPL-CCNC: 19 IU/L (ref 0–40)
BILIRUB SERPL-MCNC: 0.4 MG/DL (ref 0–1.2)
BUN SERPL-MCNC: 9 MG/DL (ref 6–20)
BUN/CREAT SERPL: 14 (ref 9–23)
CALCIUM SERPL-MCNC: 8.9 MG/DL (ref 8.7–10.2)
CHLORIDE SERPL-SCNC: 103 MMOL/L (ref 96–106)
CO2 SERPL-SCNC: 21 MMOL/L (ref 20–29)
CREAT SERPL-MCNC: 0.65 MG/DL (ref 0.57–1)
EGFR: 118 ML/MIN/1.73
GLOBULIN SER-MCNC: 2.4 G/DL (ref 1.5–4.5)
GLUCOSE SERPL-MCNC: 79 MG/DL (ref 70–99)
HCV AB S/CO SERPL IA: NON REACTIVE
POTASSIUM SERPL-SCNC: 4.3 MMOL/L (ref 3.5–5.2)
PROT SERPL-MCNC: 7.2 G/DL (ref 6–8.5)
SODIUM SERPL-SCNC: 140 MMOL/L (ref 134–144)
TSH SERPL DL<=0.005 MIU/L-ACNC: 2.26 UIU/ML (ref 0.45–4.5)

## 2023-07-05 ENCOUNTER — OFFICE VISIT (OUTPATIENT)
Dept: GASTROENTEROLOGY | Facility: CLINIC | Age: 38
End: 2023-07-05
Payer: COMMERCIAL

## 2023-07-05 VITALS
DIASTOLIC BLOOD PRESSURE: 91 MMHG | BODY MASS INDEX: 24.18 KG/M2 | HEIGHT: 62 IN | HEART RATE: 78 BPM | WEIGHT: 131.4 LBS | SYSTOLIC BLOOD PRESSURE: 124 MMHG

## 2023-07-05 DIAGNOSIS — R10.13 DYSPEPSIA: Primary | ICD-10-CM

## 2023-07-05 PROCEDURE — 99214 OFFICE O/P EST MOD 30 MIN: CPT | Performed by: INTERNAL MEDICINE

## 2023-07-05 RX ORDER — PANTOPRAZOLE SODIUM 40 MG/1
40 TABLET, DELAYED RELEASE ORAL DAILY
Qty: 30 TABLET | Refills: 11 | Status: SHIPPED | OUTPATIENT
Start: 2023-07-05

## 2023-07-05 NOTE — ASSESSMENT & PLAN NOTE
Possible peptic ulcer disease or gastric erosions. Patient had food allergy panel and celiac disease panel testing last year which were negative. Consider gastroparesis. -Patient was explained about the lifestyle and dietary modifications. Advised to avoid fatty foods, chocolates, caffeine, alcohol and any other triggering foods. Avoid eating for at least 3 hours before going to bed.    -We will give a trial with pantoprazole 40 mg daily    -May take Gas-X or Beano as needed    -Advised about small frequent low-fat meal    -Schedule upper endoscopy    -If patient continues to have symptoms we can check a gastric emptying study    -Patient was explained about  the risks and benefits of the procedure. Risks including but not limited to bleeding, infection, perforation were explained in detail. Also explained about less than 100% sensitivity with the exam and other alternatives.

## 2023-07-05 NOTE — PATIENT INSTRUCTIONS
Scheduled date of EGD(as of today):07/28/23   Physician performing EGD:Og  Location of EGD:[facility]  Instructions reviewed with patient by:Beatriz CAMARENA  Clearances:  None

## 2023-07-05 NOTE — PROGRESS NOTES
Follow-up Note -  [facility]  Carmine Akers 1985 female         Reason: Abdominal pain    HPI:  Ms. Aydee Hinds came in because of symptoms of epigastric pain associated with pressure into the chest and radiation to the back and shoulders for few months. She has associated nausea and bloating after eating. She was seen in the office last year because of GI issues and I did EGD and colonoscopy on her. There was a large colon polyp removed at that time. She reports her symptoms improved after stopping BuSpar. Complaining about weight gain. Regular bowel movements and denies any blood or mucus in the stool. Denies any NSAID use. Chaperon: Ms. Diaz Showers: Review of Systems   Constitutional: Negative for activity change, appetite change, chills, diaphoresis, fatigue, fever and unexpected weight change. HENT: Negative for ear discharge, ear pain, facial swelling, hearing loss, nosebleeds, sore throat, tinnitus and voice change. Eyes: Negative for pain, discharge, redness, itching and visual disturbance. Respiratory: Negative for apnea, cough, chest tightness, shortness of breath and wheezing. Cardiovascular: Negative for chest pain and palpitations. Gastrointestinal:        As noted in HPI   Endocrine: Negative for cold intolerance, heat intolerance and polyuria. Genitourinary: Negative for difficulty urinating, dysuria, flank pain, hematuria and urgency. Musculoskeletal: Positive for arthralgias and myalgias. Negative for back pain, gait problem and joint swelling. Skin: Negative for rash and wound. Neurological: Negative for dizziness, tremors, seizures, speech difficulty, light-headedness, numbness and headaches. Hematological: Negative for adenopathy. Does not bruise/bleed easily. Psychiatric/Behavioral: Negative for agitation, behavioral problems and confusion. The patient is not nervous/anxious.     All other systems reviewed and are negative.        Past Medical History:   Diagnosis Date   • Abdominal cramping    • Anxiety 06/05/2018   • Asthma    • COVID-19 01/2022    headache,loss of taste/smell   • Diarrhea 2020    dairy-diarrhea,vomiting   • PONV (postoperative nausea and vomiting)    • Psychiatric disorder    • Shortness of breath    • Varicella       Past Surgical History:   Procedure Laterality Date   • CHOLECYSTECTOMY  2008   • CONDYLOMA EXCISION/FULGURATION     • SALPINGECTOMY Bilateral 07/03/2017    Procedure: SALPINGECTOMY;  Surgeon: Fracisco Mccarthy MD;  [facility];  Service: Gynecology   • TUBAL LIGATION  2017     Social History     Socioeconomic History   • Marital status: Single     Spouse name: Not on file   • Number of children: Not on file   • Years of education: Not on file   • Highest education level: Not on file   Occupational History   • Not on file   Tobacco Use   • Smoking status: Never   • Smokeless tobacco: Never   Vaping Use   • Vaping Use: Never used   Substance and Sexual Activity   • Alcohol use: No   • Drug use: No   • Sexual activity: Not Currently     Partners: Male     Birth control/protection: Abstinence     Comment: onset of intercourse 15 yrs of age and consentual / unprotected sex  / oral sex educated on STD and HPV  / lifetime partners-8  / current partner 1 yr 1 partner    Other Topics Concern   • Not on file   Social History Narrative   • Not on file     Social Determinants of Health     Financial Resource Strain: Not on file   Food Insecurity: Not on file   Transportation Needs: Not on file   Physical Activity: Not on file   Stress: Not on file   Social Connections: Not on file   Intimate Partner Violence: Not on file   Housing Stability: Not on file     Family History   Problem Relation Age of Onset   • Other Mother         accidental overdose   • Thyroid disease unspecified Mother    • Substance Abuse Mother    • Depression Mother    • Bipolar disorder Mother    • Drug abuse Mother    • Alcohol abuse Father    • Cancer Father         liver cancer   • No Known Problems Sister    • No Known Problems Brother    • No Known Problems Daughter    • No Known Problems Son    • Diabetes Maternal Grandmother    • Heart disease Maternal Grandmother    • No Known Problems Maternal Grandfather    • No Known Problems Paternal Grandmother    • No Known Problems Paternal Grandfather    • Stroke Maternal Aunt    • No Known Problems Maternal Uncle    • No Known Problems Paternal Aunt    • No Known Problems Paternal Uncle      Pollen extract  Current Outpatient Medications   Medication Sig Dispense Refill   • cyclobenzaprine (FLEXERIL) 10 mg tablet Take 1 tablet (10 mg total) by mouth 2 (two) times a day as needed for muscle spasms 60 tablet 0   • pantoprazole (PROTONIX) 40 mg tablet Take 1 tablet (40 mg total) by mouth daily 30 tablet 11   • Probiotic Product (PROBIOTIC DAILY PO) Take by mouth as needed     • Simethicone (GAS-X PO) Take by mouth if needed       No current facility-administered medications for this visit. Blood pressure 124/91, pulse 78, height 5' 2" (1.575 m), weight 59.6 kg (131 lb 6.4 oz), not currently breastfeeding. PHYSICAL EXAM: Physical Exam  Constitutional:       Appearance: Normal appearance. She is well-developed. HENT:      Head: Normocephalic and atraumatic. Nose: Nose normal.   Eyes:      Conjunctiva/sclera: Conjunctivae normal.   Neck:      Thyroid: No thyromegaly. Vascular: No JVD. Trachea: No tracheal deviation. Cardiovascular:      Rate and Rhythm: Normal rate and regular rhythm. Heart sounds: Normal heart sounds. No murmur heard. No friction rub. No gallop. Pulmonary:      Effort: Pulmonary effort is normal. No respiratory distress. Breath sounds: Normal breath sounds. No wheezing or rales. Abdominal:      General: Bowel sounds are normal. There is no distension. Palpations: Abdomen is soft. There is no mass. Tenderness:  There is no abdominal tenderness. There is no guarding. Hernia: No hernia is present. Musculoskeletal:         General: No tenderness or deformity. Cervical back: Neck supple. Right lower leg: No edema. Left lower leg: No edema. Lymphadenopathy:      Cervical: No cervical adenopathy. Skin:     General: Skin is warm and dry. Findings: No erythema or rash. Neurological:      Mental Status: She is alert and oriented to person, place, and time. Psychiatric:         Mood and Affect: Mood normal.         Behavior: Behavior normal.         Thought Content: Thought content normal.          Lab Results   Component Value Date    WBC 7.9 04/12/2022    HGB 13.9 04/12/2022    HCT 40.1 04/12/2022    MCV 93 04/12/2022     04/12/2022     Lab Results   Component Value Date    K 4.3 05/25/2023    CO2 21 05/25/2023     05/25/2023    BUN 9 05/25/2023    CREATININE 0.65 05/25/2023     Lab Results   Component Value Date    ALT 15 05/25/2023    AST 19 05/25/2023     No results found for: "INR", "PROTIME"    XR chest pa & lateral    Result Date: 8/9/2022  Impression: No acute cardiopulmonary disease. Workstation performed: [id number]       ASSESSMENT & PLAN:    Dyspepsia  Possible peptic ulcer disease or gastric erosions. Patient had food allergy panel and celiac disease panel testing last year which were negative. Consider gastroparesis. -Patient was explained about the lifestyle and dietary modifications. Advised to avoid fatty foods, chocolates, caffeine, alcohol and any other triggering foods. Avoid eating for at least 3 hours before going to bed.    -We will give a trial with pantoprazole 40 mg daily    -May take Gas-X or Beano as needed    -Advised about small frequent low-fat meal    -Schedule upper endoscopy    -If patient continues to have symptoms we can check a gastric emptying study    -Patient was explained about  the risks and benefits of the procedure.  Risks including but not limited to bleeding, infection, perforation were explained in detail. Also explained about less than 100% sensitivity with the exam and other alternatives.

## 2023-07-14 ENCOUNTER — ANNUAL EXAM (OUTPATIENT)
Dept: FAMILY MEDICINE CLINIC | Facility: CLINIC | Age: 38
End: 2023-07-14
Payer: COMMERCIAL

## 2023-07-14 VITALS
HEIGHT: 62 IN | BODY MASS INDEX: 23.96 KG/M2 | WEIGHT: 130.19 LBS | OXYGEN SATURATION: 97 % | SYSTOLIC BLOOD PRESSURE: 112 MMHG | DIASTOLIC BLOOD PRESSURE: 70 MMHG | RESPIRATION RATE: 21 BRPM | HEART RATE: 93 BPM | TEMPERATURE: 98.2 F

## 2023-07-14 DIAGNOSIS — N88.8 NABOTHIAN CYST: ICD-10-CM

## 2023-07-14 DIAGNOSIS — Z12.4 SCREENING FOR CERVICAL CANCER: ICD-10-CM

## 2023-07-14 DIAGNOSIS — Z01.419 ROUTINE GYNECOLOGICAL EXAMINATION: Primary | ICD-10-CM

## 2023-07-14 PROBLEM — Z87.42 HISTORY OF ABNORMAL CERVICAL PAP SMEAR: Status: ACTIVE | Noted: 2023-07-14

## 2023-07-14 PROBLEM — Z00.00 ENCOUNTER FOR ANNUAL PHYSICAL EXAMINATION EXCLUDING GYNECOLOGICAL EXAMINATION IN A PATIENT OLDER THAN 17 YEARS: Status: ACTIVE | Noted: 2023-07-14

## 2023-07-14 PROCEDURE — G0476 HPV COMBO ASSAY CA SCREEN: HCPCS | Performed by: STUDENT IN AN ORGANIZED HEALTH CARE EDUCATION/TRAINING PROGRAM

## 2023-07-14 PROCEDURE — G0145 SCR C/V CYTO,THINLAYER,RESCR: HCPCS | Performed by: STUDENT IN AN ORGANIZED HEALTH CARE EDUCATION/TRAINING PROGRAM

## 2023-07-14 PROCEDURE — 99395 PREV VISIT EST AGE 18-39: CPT | Performed by: FAMILY MEDICINE

## 2023-07-14 NOTE — PROGRESS NOTES
[facility]  Outpatient Visit - [address]: 23     Patient's Information      Name: Wagner Escobar  Age/Sex: 41 y.o. female  MRN: [id number]  : 1985      Assessment/Plan     A/P: Wagner Escobar is a 41 y.o. female patient that came to the clinic today for annual gyn exam and cervical cancer screening. Chart reviewed. Plan below. Encounter for annual physical examination excluding gynecological examination in a patient older than 17 years    Pt due for pap. Procedure was tolerated well. Pap smear and HPV testing done today in clinic. Nabothian cyst noticed at 3 o'clock. Pt had mild friability and bleeding after pap. No major complaints. Bimanual exam and bilateral breast exam were normal.     · Advised pt that nabothian cyst are benign lesions  · Will call pt to discuss results when available  · Will schedule visit for follow up if results are abnormal - for further assessment    Associated orders were discussed and explained to the pt. Pertinent care gaps were addressed. Pt voiced understanding and acceptance with A/P. Pt will call the office if any further questions/concerns. Next Visit: Return if symptoms worsen or fail to improve. A/P of patient's case was discussed with the Attending, Dr. Leah Tee. Subjective     History of Present Illness      Chief Complaint   Patient presents with   • Gynecologic Exam     Pap     41 y.o. female patient came to the clinic for annual GYN examination and cervical cancer screening with Pap smear. Patient is [id number]. States all her deliveries were vaginal with no complications. Patient has history of abnormal Paps in the past with positive HPV. Has undergone colposcopy in the past. She has been consistent with her screening. Last 2 Paps were negative.  She refers that there is no history of ovarian, endometrial, colon, cervical, or breast cancer in her family that she can recall. She is currently not sexually active. Has not been for the past 9 months. Currently on vacation. Works as as a lunch lady in a school. She does not smoke or use any drugs. Does not drink alcohol. Refers that her periods come every month and have a duration of about 4 days. Has normal cramping and normal bleeding. Pt was sterilized with tubal ligation. I reviewed patient's hx and updated as appropriate if needed: allergies, current medications, PMHx, FHx, social hx, surgical hx, and problem list.      Objective     Vital Signs     Visit Vitals  /70 (BP Location: Left arm, Patient Position: Sitting, Cuff Size: Standard)   Pulse 93   Temp 98.2 °F (36.8 °C) (Temporal)   Resp 21   Ht 5' 2" (1.575 m)   Wt 59.1 kg (130 lb 3 oz)   LMP 07/08/2023 (Exact Date)   SpO2 97%   BMI 23.81 kg/m²   OB Status Unknown   Smoking Status Never   BSA 1.59 m²      Physical Exam      Exam conducted with a chaperone present. Constitutional:       General: She is not in acute distress. Appearance: Normal appearance. She is normal weight. She is not ill-appearing or toxic-appearing. HENT:      Head: Normocephalic and atraumatic. Right Ear: External ear normal.      Left Ear: External ear normal.      Nose: Nose normal.      Mouth/Throat:      Mouth: Mucous membranes are moist.   Eyes:      General: No scleral icterus. Conjunctiva/sclera: Conjunctivae normal.   Cardiovascular:      Chest: No masses noted on bilateral breast, no nipple discharge or associated axillary lymphadenopathy noted. Rate and Rhythm: Normal rate and regular rhythm. Pulses: Normal pulses. Heart sounds: Normal heart sounds. Pulmonary:      Effort: Pulmonary effort is normal. No respiratory distress. Breath sounds: Normal breath sounds. Abdominal:      General: Bowel sounds are normal. There is no distension. Palpations: Abdomen is soft. Tenderness: There is no abdominal tenderness. Hernia: There is no hernia in the left inguinal area or right inguinal area. Genitourinary:     General: Normal vulva. Exam position: Lithotomy position. Pubic Area: No rash or pubic lice. Richar stage (genital): 5. Labia:         Right: No rash, tenderness, lesion or injury. Left: No rash, tenderness, lesion or injury. Urethra: No prolapse, urethral pain, urethral swelling or urethral lesion. Vagina: Vaginal discharge (Whitish) present. Cervix: Dilated. Discharge (Whitish discharge), friability and cervical bleeding present. No cervical motion tenderness, erythema or eversion. Uterus: Normal.       Adnexa: Right adnexa normal and left adnexa normal.         Musculoskeletal:         General: Normal range of motion. Cervical back: Normal range of motion. Right lower leg: No edema. Left lower leg: No edema. Lymphadenopathy:      Lower Body: No right inguinal adenopathy. No left inguinal adenopathy. Skin:     General: Skin is warm and dry. Findings: No lesion or rash. Neurological:      Mental Status: She is alert and oriented to person, place, and time. Psychiatric:         Mood and Affect: Mood normal.         Behavior: Behavior normal.         Thought Content: Thought content normal.         Judgment: Judgment normal.          It was a pleasure being of service to Noman. Thank you. Brock Garces MD., [facility] [address] [place] [address]      07/14/23   1:11 PM          Portions of the record may have been created with voice recognition software. Occasional wrong word or "sound a like" substitutions may have occurred due to the inherent limitations of voice recognition software. Read the chart carefully and recognize, using context, where substitutions have occurred.

## 2023-07-14 NOTE — ASSESSMENT & PLAN NOTE
Pt due for pap. Procedure was tolerated well. Pap smear and HPV testing done today in clinic. Nabothian cyst noticed at 3 o'clock. Pt had mild friability and bleeding after pap. No major complaints.  Bimanual exam and bilateral breast exam were normal.     · Advised pt that nabothian cyst are benign lesions  · Will call pt to discuss results when available  · Will schedule visit for follow up if results abnormal for further assessment

## 2023-07-17 PROBLEM — Z01.419 ROUTINE GYNECOLOGICAL EXAMINATION: Status: ACTIVE | Noted: 2023-07-14

## 2023-07-18 LAB
HPV HR 12 DNA CVX QL NAA+PROBE: NEGATIVE
HPV16 DNA CVX QL NAA+PROBE: NEGATIVE
HPV18 DNA CVX QL NAA+PROBE: NEGATIVE

## 2023-07-21 LAB
LAB AP GYN PRIMARY INTERPRETATION: NORMAL
Lab: NORMAL
PATH INTERP SPEC-IMP: NORMAL

## 2023-07-27 ENCOUNTER — TELEPHONE (OUTPATIENT)
Age: 38
End: 2023-07-27

## 2023-07-27 NOTE — TELEPHONE ENCOUNTER
Patients GI provider:  Dr. Verena Bermudez      Reason for call: Pt called to cancel EGD did not wish to reschedule at this time thank you     Scheduled procedure/appointment date if applicable: procedure 04/39/7362

## 2023-09-12 PROBLEM — Z12.4 SCREENING FOR CERVICAL CANCER: Status: RESOLVED | Noted: 2023-07-14 | Resolved: 2023-09-12

## 2023-09-15 PROBLEM — Z01.419 ROUTINE GYNECOLOGICAL EXAMINATION: Status: RESOLVED | Noted: 2023-07-14 | Resolved: 2023-09-15

## 2023-10-02 ENCOUNTER — OFFICE VISIT (OUTPATIENT)
Dept: FAMILY MEDICINE CLINIC | Facility: CLINIC | Age: 38
End: 2023-10-02
Payer: COMMERCIAL

## 2023-10-02 VITALS
BODY MASS INDEX: 23.81 KG/M2 | SYSTOLIC BLOOD PRESSURE: 124 MMHG | RESPIRATION RATE: 18 BRPM | DIASTOLIC BLOOD PRESSURE: 68 MMHG | HEIGHT: 62 IN | TEMPERATURE: 97.6 F | WEIGHT: 129.4 LBS | HEART RATE: 63 BPM

## 2023-10-02 DIAGNOSIS — F41.9 ANXIETY: Primary | ICD-10-CM

## 2023-10-02 DIAGNOSIS — R03.0 ELEVATED BLOOD PRESSURE READING WITHOUT DIAGNOSIS OF HYPERTENSION: ICD-10-CM

## 2023-10-02 PROCEDURE — 99214 OFFICE O/P EST MOD 30 MIN: CPT | Performed by: FAMILY MEDICINE

## 2023-10-02 RX ORDER — ESCITALOPRAM OXALATE 5 MG/1
5 TABLET ORAL DAILY
Qty: 30 TABLET | Refills: 1 | Status: SHIPPED | OUTPATIENT
Start: 2023-10-02

## 2023-10-02 NOTE — PROGRESS NOTES
Name: Angie Bolivar      : 1985      MRN: [id number]  Encounter Provider: Solo López MD  Encounter Date: 10/2/2023   Encounter department: [facility]     1. Anxiety  -     escitalopram (LEXAPRO) 5 mg tablet; Take 1 tablet (5 mg total) by mouth daily    2. Elevated blood pressure reading without diagnosis of hypertension    Will trial lexapro for anxiety. Start at lower dose given that she has had side effects with other anti anxiety medications in the past. Return in 1 month for follow up. She does report high blood pressures at home, but her BP today is within range. She will bring her home BP machine with her at next visit. Vimal Joaquin is here today as a new patient to establish care. She is a former pt of [facility]. She is here to discuss her blood pressures and anxiety. Has noticed elevated blood pressures intermittently for a while now. Her normal pressures are on the lower end. She feels flushed and her BP is usually high when that happens. She does have anxiety and has tried multiple medications for this in the past including cymbalta, zoloft, buspar. Has not been able to tolerate. Her anxiety causes muscle tension, breathing issues, trouble with sleep and potentially the flushing/high blood pressures. LISA-7 Flowsheet Screening    Flowsheet Row Most Recent Value   Over the last 2 weeks, how often have you been bothered by any of the following problems?     Feeling nervous, anxious, or on edge 2   Not being able to stop or control worrying 1   Worrying too much about different things 0   Trouble relaxing 3   Being so restless that it is hard to sit still 2   Becoming easily annoyed or irritable 2   Feeling afraid as if something awful might happen 2   LISA-7 Total Score 12        PHQ-2/9 Depression Screening    Little interest or pleasure in doing things: 2 - more than half the days  Feeling down, depressed, or hopeless: 0 - not at all  PHQ-2 Score: 2  PHQ-2 Interpretation: Negative depression screen       Review of Systems   Constitutional: Negative. HENT: Negative. Eyes: Negative. Respiratory: Negative. Cardiovascular: Negative. Gastrointestinal: Negative. Endocrine: Negative. Genitourinary: Negative. Musculoskeletal: Negative. Skin: Negative. Allergic/Immunologic: Negative. Neurological: Negative. Hematological: Negative. Psychiatric/Behavioral: The patient is nervous/anxious. Current Outpatient Medications on File Prior to Visit   Medication Sig   • cyclobenzaprine (FLEXERIL) 10 mg tablet Take 1 tablet (10 mg total) by mouth 2 (two) times a day as needed for muscle spasms   • pantoprazole (PROTONIX) 40 mg tablet Take 1 tablet (40 mg total) by mouth daily   • Probiotic Product (PROBIOTIC DAILY PO) Take by mouth as needed   • Simethicone (GAS-X PO) Take by mouth if needed       Objective     /68   Pulse 63   Temp 97.6 °F (36.4 °C)   Resp 18   Ht 5' 2" (1.575 m)   Wt 58.7 kg (129 lb 6.4 oz)   LMP 09/28/2023 (Exact Date)   BMI 23.67 kg/m²     Physical Exam  Constitutional:       General: She is not in acute distress. Appearance: She is well-developed. She is not diaphoretic. HENT:      Head: Normocephalic and atraumatic. Eyes:      General: No scleral icterus. Right eye: No discharge. Left eye: No discharge. Conjunctiva/sclera: Conjunctivae normal.   Pulmonary:      Effort: Pulmonary effort is normal.   Musculoskeletal:      Cervical back: Normal range of motion. Skin:     General: Skin is warm. Neurological:      Mental Status: She is alert and oriented to person, place, and time. Psychiatric:         Behavior: Behavior normal.         Thought Content:  Thought content normal.         Judgment: Judgment normal.       Rohini Keyes MD

## 2023-11-06 ENCOUNTER — OFFICE VISIT (OUTPATIENT)
Dept: FAMILY MEDICINE CLINIC | Facility: CLINIC | Age: 38
End: 2023-11-06
Payer: COMMERCIAL

## 2023-11-06 VITALS
HEART RATE: 69 BPM | RESPIRATION RATE: 16 BRPM | TEMPERATURE: 98.2 F | DIASTOLIC BLOOD PRESSURE: 80 MMHG | WEIGHT: 129 LBS | BODY MASS INDEX: 23.59 KG/M2 | SYSTOLIC BLOOD PRESSURE: 126 MMHG

## 2023-11-06 DIAGNOSIS — F41.9 ANXIETY: Primary | ICD-10-CM

## 2023-11-06 PROCEDURE — 99213 OFFICE O/P EST LOW 20 MIN: CPT | Performed by: FAMILY MEDICINE

## 2023-11-06 NOTE — ASSESSMENT & PLAN NOTE
She has tried multiple anti anxiety medications in the past, but even at low doses is unable to tolerate side effects. Most recently was prescribed lexapro 5mg and stopped taking it after one dose due to severe nausea. Will hold off on medications at this time. She will look into seeing a therapist moving forward.

## 2023-11-06 NOTE — PROGRESS NOTES
Name: Sharon Saldana      : 1985      MRN: [id number]  Encounter Provider: Keisha Vaughn MD  Encounter Date: 2023   Encounter department: [address] [place] [address]. Anxiety  Assessment & Plan:  She has tried multiple anti anxiety medications in the past, but even at low doses is unable to tolerate side effects. Most recently was prescribed lexapro 5mg and stopped taking it after one dose due to severe nausea. Will hold off on medications at this time. She will look into seeing a therapist moving forward. Subjective      Anxiety  Presents for follow-up visit. Symptoms include nervous/anxious behavior. LISA-7 Flowsheet Screening      Flowsheet Row Most Recent Value   Over the last 2 weeks, how often have you been bothered by any of the following problems? Feeling nervous, anxious, or on edge 2   Not being able to stop or control worrying 1   Worrying too much about different things 1   Trouble relaxing 2   Being so restless that it is hard to sit still 2   Becoming easily annoyed or irritable 1   Feeling afraid as if something awful might happen 1   LISA-7 Total Score 10             Review of Systems   Constitutional: Negative. HENT: Negative. Eyes: Negative. Respiratory: Negative. Cardiovascular: Negative. Gastrointestinal: Negative. Endocrine: Negative. Genitourinary: Negative. Musculoskeletal: Negative. Skin: Negative. Allergic/Immunologic: Negative. Neurological: Negative. Hematological: Negative. Psychiatric/Behavioral:  The patient is nervous/anxious.         Current Outpatient Medications on File Prior to Visit   Medication Sig    cyclobenzaprine (FLEXERIL) 10 mg tablet Take 1 tablet (10 mg total) by mouth 2 (two) times a day as needed for muscle spasms    pantoprazole (PROTONIX) 40 mg tablet Take 1 tablet (40 mg total) by mouth daily    Probiotic Product (PROBIOTIC DAILY PO) Take by mouth as needed Simethicone (GAS-X PO) Take by mouth if needed    [DISCONTINUED] escitalopram (LEXAPRO) 5 mg tablet Take 1 tablet (5 mg total) by mouth daily       Objective     /80   Pulse 69   Temp 98.2 °F (36.8 °C)   Resp 16   Wt 58.5 kg (129 lb)   LMP 10/19/2023 (Approximate)   BMI 23.59 kg/m²     Physical Exam  Constitutional:       General: She is not in acute distress. Appearance: She is well-developed. She is not diaphoretic. HENT:      Head: Normocephalic and atraumatic. Eyes:      General: No scleral icterus. Right eye: No discharge. Left eye: No discharge. Conjunctiva/sclera: Conjunctivae normal.   Pulmonary:      Effort: Pulmonary effort is normal.   Musculoskeletal:      Cervical back: Normal range of motion. Skin:     General: Skin is warm. Neurological:      Mental Status: She is alert and oriented to person, place, and time. Psychiatric:         Behavior: Behavior normal.         Thought Content:  Thought content normal.         Judgment: Judgment normal.       Betina Godfrey MD

## 2024-05-08 ENCOUNTER — OFFICE VISIT (OUTPATIENT)
Dept: FAMILY MEDICINE CLINIC | Facility: CLINIC | Age: 39
End: 2024-05-08
Payer: COMMERCIAL

## 2024-05-08 VITALS
HEIGHT: 62 IN | TEMPERATURE: 99.6 F | DIASTOLIC BLOOD PRESSURE: 70 MMHG | BODY MASS INDEX: 23.55 KG/M2 | HEART RATE: 68 BPM | RESPIRATION RATE: 16 BRPM | WEIGHT: 128 LBS | SYSTOLIC BLOOD PRESSURE: 120 MMHG

## 2024-05-08 DIAGNOSIS — R07.89 DISCOMFORT IN CHEST: ICD-10-CM

## 2024-05-08 DIAGNOSIS — Z13.21 ENCOUNTER FOR VITAMIN DEFICIENCY SCREENING: ICD-10-CM

## 2024-05-08 DIAGNOSIS — F32.2 MODERATELY SEVERE MAJOR DEPRESSION (HCC): ICD-10-CM

## 2024-05-08 DIAGNOSIS — M79.18 MUSCLE PAIN, MYOFASCIAL: Primary | ICD-10-CM

## 2024-05-08 DIAGNOSIS — F41.9 ANXIETY: ICD-10-CM

## 2024-05-08 PROCEDURE — 99214 OFFICE O/P EST MOD 30 MIN: CPT | Performed by: FAMILY MEDICINE

## 2024-05-08 RX ORDER — PREGABALIN 75 MG/1
75 CAPSULE ORAL 2 TIMES DAILY
Qty: 60 CAPSULE | Refills: 0 | Status: SHIPPED | OUTPATIENT
Start: 2024-05-08

## 2024-05-08 NOTE — PROGRESS NOTES
Name: Maribel Duncan      : 1985      MRN: [id number]  Encounter Provider: Perla Caraballo MD  Encounter Date: 2024   Encounter department: [facility]    Assessment & Plan     1. Muscle pain, myofascial  -     pregabalin (LYRICA) 75 mg capsule; Take 1 capsule (75 mg total) by mouth 2 (two) times a day  -     CBC and differential; Future  -     Comprehensive metabolic panel; Future  -     Lipid Panel with Direct LDL reflex; Future  -     TSH, 3rd generation with Free T4 reflex; Future  -     Vitamin B12; Future  -     Vitamin D 25 hydroxy; Future  -     C-reactive protein; Future  -     Lyme Total AB W Reflex to IGM/IGG; Future  -     ANGELO Comprehensive Panel; Future  -     CBC and differential  -     Comprehensive metabolic panel  -     Lipid Panel with Direct LDL reflex  -     TSH, 3rd generation with Free T4 reflex  -     Vitamin B12  -     Vitamin D 25 hydroxy  -     C-reactive protein  -     Lyme Total AB W Reflex to IGM/IGG  -     ANGELO Comprehensive Panel    2. Discomfort in chest  -     pregabalin (LYRICA) 75 mg capsule; Take 1 capsule (75 mg total) by mouth 2 (two) times a day  -     CBC and differential; Future  -     Comprehensive metabolic panel; Future  -     Lipid Panel with Direct LDL reflex; Future  -     TSH, 3rd generation with Free T4 reflex; Future  -     Vitamin B12; Future  -     Vitamin D 25 hydroxy; Future  -     C-reactive protein; Future  -     Lyme Total AB W Reflex to IGM/IGG; Future  -     ANGELO Comprehensive Panel; Future  -     CBC and differential  -     Comprehensive metabolic panel  -     Lipid Panel with Direct LDL reflex  -     TSH, 3rd generation with Free T4 reflex  -     Vitamin B12  -     Vitamin D 25 hydroxy  -     C-reactive protein  -     Lyme Total AB W Reflex to IGM/IGG  -     ANGELO Comprehensive Panel  -     CT chest wo contrast; Future; Expected date: 2024    3. Encounter for vitamin deficiency screening  -     Vitamin D 25 hydroxy; Future  -      Iron Panel (Includes Ferritin, Iron Sat%, Iron, and TIBC); Future  -     Magnesium; Future  -     Vitamin D 25 hydroxy  -     Magnesium    4. Moderately severe major depression (HCC)    5. Anxiety       Maribel has had a chest pain, upper/mid back pain, shortness of breath, epigastric discomfort since having COVID-19 4 years ago. Her symptoms are chronic. She has been worked up by cardiology, pulmonology, rheumatology and GI with no significant abnormalities noted on testing. Thought to have possible fibromyalgia and was trialed on cymbalta, but could not tolerate side effects. Will trial lyrica. She does struggle with depression/anxiety, but is afraid to try an antidepressant at this point because of side effects and she reports her poor mood is related to her other pain/shortness of breath. Will do further testing with updated blood work and CT chest.     Subjective      HPI  She reports 4 year history of shortness of breath, chest tightness, upper back pain.    Nyquil, melatonin, flexeril help her sleep.   Breathing through her nose makes her feel like she's suffocating.   She's been to cardiology, pulmonology, rheumatology and GI. Has had work up including EKG, echo, holter monitor, endoscopy/colonoscopy, chest xray, blood work with no significant abnormalities. Symptoms all began after having COVID in 2020.   PHQ-2/9 Depression Screening    Little interest or pleasure in doing things: 1 - several days  Feeling down, depressed, or hopeless: 3 - nearly every day  Trouble falling or staying asleep, or sleeping too much: 3 - nearly every day  Feeling tired or having little energy: 3 - nearly every day  Poor appetite or overeating: 3 - nearly every day  Feeling bad about yourself - or that you are a failure or have let yourself or your family down: 0 - not at all  Trouble concentrating on things, such as reading the newspaper or watching television: 0 - not at all  Moving or speaking so slowly that other people  "could have noticed. Or the opposite - being so fidgety or restless that you have been moving around a lot more than usual: 3 - nearly every day  Thoughts that you would be better off dead, or of hurting yourself in some way: 0 - not at all  PHQ-2 Score: 4  PHQ-2 Interpretation: POSITIVE depression screen  PHQ-9 Score: 16  PHQ-9 Interpretation: Moderately severe depression         Review of Systems   Constitutional:  Positive for fatigue.   HENT: Negative.     Eyes: Negative.    Respiratory:  Positive for chest tightness and shortness of breath.    Cardiovascular:  Positive for chest pain.   Gastrointestinal:  Positive for abdominal pain.   Endocrine: Negative.    Genitourinary: Negative.    Musculoskeletal:  Positive for myalgias.   Skin: Negative.    Allergic/Immunologic: Negative.    Neurological: Negative.    Hematological: Negative.    Psychiatric/Behavioral:  Positive for decreased concentration and dysphoric mood.        Current Outpatient Medications on File Prior to Visit   Medication Sig    cyclobenzaprine (FLEXERIL) 10 mg tablet Take 1 tablet (10 mg total) by mouth 2 (two) times a day as needed for muscle spasms    pantoprazole (PROTONIX) 40 mg tablet Take 1 tablet (40 mg total) by mouth daily    Probiotic Product (PROBIOTIC DAILY PO) Take by mouth as needed    Simethicone (GAS-X PO) Take by mouth if needed       Objective     /70   Pulse 68   Temp 99.6 °F (37.6 °C)   Resp 16   Ht 5' 2\" (1.575 m)   Wt 58.1 kg (128 lb)   LMP 04/15/2024 (Exact Date)   BMI 23.41 kg/m²     Physical Exam  Constitutional:       General: She is not in acute distress.     Appearance: She is well-developed. She is not diaphoretic.   HENT:      Head: Normocephalic and atraumatic.   Cardiovascular:      Rate and Rhythm: Normal rate and regular rhythm.      Heart sounds: Normal heart sounds. No murmur heard.     No friction rub. No gallop.   Pulmonary:      Effort: Pulmonary effort is normal. No respiratory distress.      " Breath sounds: Normal breath sounds. No wheezing or rales.   Chest:      Chest wall: No tenderness.   Musculoskeletal:         General: No deformity. Normal range of motion.      Cervical back: Normal range of motion and neck supple.   Skin:     General: Skin is warm and dry.   Neurological:      Mental Status: She is alert and oriented to person, place, and time.   Psychiatric:         Behavior: Behavior normal.         Thought Content: Thought content normal.         Judgment: Judgment normal.       Perla Caraballo MD

## 2024-05-14 ENCOUNTER — TELEPHONE (OUTPATIENT)
Age: 39
End: 2024-05-14

## 2024-05-14 DIAGNOSIS — R07.89 DISCOMFORT IN CHEST: Primary | ICD-10-CM

## 2024-05-14 NOTE — TELEPHONE ENCOUNTER
Maribel called in stated that Her CT scan was denied by her insurance because she needed an updated Xray of her chest before she can complete the CT scan. She would like Dr Caraballo to put an order in her chart  for a chest Xray. Last done 8/822. Please Advise Thank you

## 2024-05-14 NOTE — TELEPHONE ENCOUNTER
There is another message for this in the chart that was sent to clinical- I did order a chest xray for her. Can we please let her know. Thank you.

## 2024-05-15 ENCOUNTER — HOSPITAL ENCOUNTER (OUTPATIENT)
Dept: RADIOLOGY | Facility: HOSPITAL | Age: 39
Discharge: HOME | End: 2024-05-15
Payer: COMMERCIAL

## 2024-05-15 DIAGNOSIS — R07.89 DISCOMFORT IN CHEST: ICD-10-CM

## 2024-05-15 PROCEDURE — 71046 X-RAY EXAM CHEST 2 VIEWS: CPT

## 2024-05-25 LAB
25(OH)D3+25(OH)D2 SERPL-MCNC: 21.5 NG/ML (ref 30–100)
ALBUMIN SERPL-MCNC: 4.6 G/DL (ref 3.9–4.9)
ALBUMIN/GLOB SERPL: 1.8 {RATIO} (ref 1.2–2.2)
ALP SERPL-CCNC: 74 IU/L (ref 44–121)
ALT SERPL-CCNC: 7 IU/L (ref 0–32)
AST SERPL-CCNC: 16 IU/L (ref 0–40)
B BURGDOR IGG+IGM SER QL IA: NEGATIVE
BASOPHILS # BLD AUTO: 0.1 X10E3/UL (ref 0–0.2)
BASOPHILS NFR BLD AUTO: 1 %
BILIRUB SERPL-MCNC: 0.2 MG/DL (ref 0–1.2)
BUN SERPL-MCNC: 11 MG/DL (ref 6–20)
BUN/CREAT SERPL: 14 (ref 9–23)
CALCIUM SERPL-MCNC: 9.1 MG/DL (ref 8.7–10.2)
CENTROMERE B AB SER-ACNC: <0.2 AI (ref 0–0.9)
CHLORIDE SERPL-SCNC: 103 MMOL/L (ref 96–106)
CHOLEST SERPL-MCNC: 179 MG/DL (ref 100–199)
CHROMATIN AB SERPL-ACNC: <0.2 AI (ref 0–0.9)
CO2 SERPL-SCNC: 21 MMOL/L (ref 20–29)
CREAT SERPL-MCNC: 0.76 MG/DL (ref 0.57–1)
CRP SERPL-MCNC: <1 MG/L (ref 0–10)
DSDNA AB SER-ACNC: 3 IU/ML (ref 0–9)
EGFR: 104 ML/MIN/1.73
ENA JO1 AB SER-ACNC: <0.2 AI (ref 0–0.9)
ENA RNP AB SER-ACNC: <0.2 AI (ref 0–0.9)
ENA SCL70 AB SER-ACNC: <0.2 AI (ref 0–0.9)
ENA SM AB SER-ACNC: <0.2 AI (ref 0–0.9)
ENA SS-A AB SER-ACNC: <0.2 AI (ref 0–0.9)
ENA SS-B AB SER-ACNC: <0.2 AI (ref 0–0.9)
EOSINOPHIL # BLD AUTO: 0.1 X10E3/UL (ref 0–0.4)
EOSINOPHIL NFR BLD AUTO: 1 %
ERYTHROCYTE [DISTWIDTH] IN BLOOD BY AUTOMATED COUNT: 12.8 % (ref 11.7–15.4)
FERRITIN SERPL-MCNC: 13 NG/ML (ref 15–150)
GLOBULIN SER-MCNC: 2.6 G/DL (ref 1.5–4.5)
GLUCOSE SERPL-MCNC: 84 MG/DL (ref 70–99)
HCT VFR BLD AUTO: 39.8 % (ref 34–46.6)
HDLC SERPL-MCNC: 51 MG/DL
HGB BLD-MCNC: 12.9 G/DL (ref 11.1–15.9)
IMM GRANULOCYTES # BLD: 0 X10E3/UL (ref 0–0.1)
IMM GRANULOCYTES NFR BLD: 0 %
IRON SATN MFR SERPL: 7 % (ref 15–55)
IRON SERPL-MCNC: 26 UG/DL (ref 27–159)
LDLC SERPL CALC-MCNC: 113 MG/DL (ref 0–99)
LDLC/HDLC SERPL: 2.2 RATIO (ref 0–3.2)
LYMPHOCYTES # BLD AUTO: 2.4 X10E3/UL (ref 0.7–3.1)
LYMPHOCYTES NFR BLD AUTO: 32 %
MAGNESIUM SERPL-MCNC: 2.1 MG/DL (ref 1.6–2.3)
MCH RBC QN AUTO: 29.2 PG (ref 26.6–33)
MCHC RBC AUTO-ENTMCNC: 32.4 G/DL (ref 31.5–35.7)
MCV RBC AUTO: 90 FL (ref 79–97)
MICRODELETION SYND BLD/T FISH: NORMAL
MONOCYTES # BLD AUTO: 0.5 X10E3/UL (ref 0.1–0.9)
MONOCYTES NFR BLD AUTO: 6 %
NEUTROPHILS # BLD AUTO: 4.6 X10E3/UL (ref 1.4–7)
NEUTROPHILS NFR BLD AUTO: 60 %
PLATELET # BLD AUTO: 297 X10E3/UL (ref 150–450)
POTASSIUM SERPL-SCNC: 4.5 MMOL/L (ref 3.5–5.2)
PROT SERPL-MCNC: 7.2 G/DL (ref 6–8.5)
RBC # BLD AUTO: 4.42 X10E6/UL (ref 3.77–5.28)
SL AMB SEE BELOW:: NORMAL
SL AMB VLDL CHOLESTEROL CALC: 15 MG/DL (ref 5–40)
SODIUM SERPL-SCNC: 141 MMOL/L (ref 134–144)
TIBC SERPL-MCNC: 379 UG/DL (ref 250–450)
TRIGL SERPL-MCNC: 79 MG/DL (ref 0–149)
TSH SERPL DL<=0.005 MIU/L-ACNC: 2.47 UIU/ML (ref 0.45–4.5)
UIBC SERPL-MCNC: 353 UG/DL (ref 131–425)
VIT B12 SERPL-MCNC: 399 PG/ML (ref 232–1245)
WBC # BLD AUTO: 7.7 X10E3/UL (ref 3.4–10.8)

## 2024-06-17 ENCOUNTER — HOSPITAL ENCOUNTER (OUTPATIENT)
Dept: RADIOLOGY | Facility: HOSPITAL | Age: 39
Discharge: HOME | End: 2024-06-17
Attending: FAMILY MEDICINE
Payer: COMMERCIAL

## 2024-06-17 DIAGNOSIS — R07.89 DISCOMFORT IN CHEST: ICD-10-CM

## 2024-06-17 PROCEDURE — 71250 CT THORAX DX C-: CPT

## 2024-06-24 DIAGNOSIS — R10.13 DYSPEPSIA: ICD-10-CM

## 2024-06-24 RX ORDER — PANTOPRAZOLE SODIUM 40 MG/1
40 TABLET, DELAYED RELEASE ORAL DAILY
Qty: 30 TABLET | Refills: 0 | Status: SHIPPED | OUTPATIENT
Start: 2024-06-24

## 2024-07-03 DIAGNOSIS — M79.18 MUSCLE PAIN, MYOFASCIAL: ICD-10-CM

## 2024-07-03 DIAGNOSIS — R07.89 DISCOMFORT IN CHEST: ICD-10-CM

## 2024-07-03 NOTE — TELEPHONE ENCOUNTER
Reason for call:   [x] Refill   [] Prior Auth  [] Other:     Office:   [x] PCP/Provider - Perla Caraballo MD   [] Specialty/Provider -     Medication:     pregabalin (LYRICA) 75 mg capsule       Dose/Frequency: 75 mg, Oral, 2 times daily     Quantity: 60    Pharmacy: [facility] [id number] [place], [place] - [address]     Does the patient have enough for 3 days?   [x] Yes   [] No - Send as HP to POD

## 2024-07-05 ENCOUNTER — TELEPHONE (OUTPATIENT)
Age: 39
End: 2024-07-05

## 2024-07-08 ENCOUNTER — TELEPHONE (OUTPATIENT)
Age: 39
End: 2024-07-08

## 2024-07-08 DIAGNOSIS — R07.89 DISCOMFORT IN CHEST: ICD-10-CM

## 2024-07-08 DIAGNOSIS — M79.18 MUSCLE PAIN, MYOFASCIAL: ICD-10-CM

## 2024-07-08 RX ORDER — PREGABALIN 75 MG/1
75 CAPSULE ORAL 2 TIMES DAILY
Qty: 60 CAPSULE | Refills: 0 | OUTPATIENT
Start: 2024-07-08

## 2024-07-08 RX ORDER — PREGABALIN 75 MG/1
75 CAPSULE ORAL 2 TIMES DAILY
Qty: 60 CAPSULE | Refills: 0 | Status: SHIPPED | OUTPATIENT
Start: 2024-07-08

## 2024-07-08 RX ORDER — PREGABALIN 75 MG/1
75 CAPSULE ORAL 2 TIMES DAILY
Qty: 60 CAPSULE | Refills: 0 | Status: SHIPPED | OUTPATIENT
Start: 2024-07-08 | End: 2024-07-08 | Stop reason: SDUPTHER

## 2024-07-08 NOTE — TELEPHONE ENCOUNTER
Received call from Herlinda  of [facility] pharmacy in [place] requesting new order for   pregabalin (LYRICA) 75 mg capsule. Order comes through to pharmacy from [place] address and they need order with [place] CAL[place]# and office address. Herlinda would not let RN provide information to her. Please follow up up pharmacy for resolution.

## 2024-07-08 NOTE — TELEPHONE ENCOUNTER
Jose from [facility] called in stated that another script needs to be sent in by Dr Caraballo. The [place] has [place]     Please Advise Thank you       pregabalin (LYRICA) 75 mg capsule

## 2024-08-07 DIAGNOSIS — R10.13 DYSPEPSIA: ICD-10-CM

## 2024-08-07 RX ORDER — PANTOPRAZOLE SODIUM 40 MG/1
40 TABLET, DELAYED RELEASE ORAL DAILY
Qty: 30 TABLET | Refills: 0 | Status: SHIPPED | OUTPATIENT
Start: 2024-08-07

## 2024-08-27 ENCOUNTER — TELEPHONE (OUTPATIENT)
Age: 39
End: 2024-08-27

## 2024-08-27 NOTE — TELEPHONE ENCOUNTER
Pt last seen 7/5/23 with hx of dyspepsia. Pt has been experiencing chest and back pain over the last 8 months. The pain is constant. She state the pain in centered in the middle. She states that when she first started ppi, the pain got better but its no longer helping. Denies all other symptoms other than nausea. Pt has also had loss of appetite and believes she has lost 8 lbs over the last 8 months.     Pt advised bland diet, hydration. Ppi daily, add pepcid at bedtime and gaviscon prn. Pt instructed to monitor symptoms and call back if symptoms worsen. Pt on wait list and has appt scheduled 11/14/24

## 2024-08-27 NOTE — TELEPHONE ENCOUNTER
Patient called in to schedule a sooner appt due to symptoms. Patient was transferred over to [facility] for further assistance.

## 2024-09-08 DIAGNOSIS — R10.13 DYSPEPSIA: ICD-10-CM

## 2024-09-09 RX ORDER — PANTOPRAZOLE SODIUM 40 MG/1
40 TABLET, DELAYED RELEASE ORAL DAILY
Qty: 30 TABLET | Refills: 0 | Status: SHIPPED | OUTPATIENT
Start: 2024-09-09

## 2024-09-24 DIAGNOSIS — R07.89 DISCOMFORT IN CHEST: ICD-10-CM

## 2024-09-24 DIAGNOSIS — M79.18 MUSCLE PAIN, MYOFASCIAL: ICD-10-CM

## 2024-09-24 RX ORDER — PREGABALIN 75 MG/1
75 CAPSULE ORAL 2 TIMES DAILY
Qty: 60 CAPSULE | Refills: 0 | Status: SHIPPED | OUTPATIENT
Start: 2024-09-24

## 2024-10-15 ENCOUNTER — TELEPHONE (OUTPATIENT)
Dept: FAMILY MEDICINE CLINIC | Facility: CLINIC | Age: 39
End: 2024-10-15

## 2024-10-15 NOTE — TELEPHONE ENCOUNTER
Patient called to r/s appointment. Appointment was scheduled with Dr Caraballo 10/21 at 1:30pm. Patient was okay with date and time

## 2024-10-15 NOTE — TELEPHONE ENCOUNTER
"Called patient back and rescheduled to tomorrow 10/16/24 @ 1:45p since we did have a \"same day\" still available. NFA  "

## 2024-10-15 NOTE — TELEPHONE ENCOUNTER
LMOM for patient to call office back and reschedule with Dr Caraballo, since she has seen patient for back pain before. As of 10/15/24, we have same day slots patient can be scheduled in

## 2024-10-15 NOTE — TELEPHONE ENCOUNTER
----- Message from Frank Lombardi, DO sent at 10/15/2024  9:50 AM EDT -----  Regarding: appt  Pt has an appt tomorrow for chronic ongoing back pain - looks like she has been seen by her PCP for this multiple times.  Tomorrow she has an appt with me.  The pt should be seen and followed up by her PCP.  Cancel appt and resched as appropriate

## 2024-10-16 ENCOUNTER — OFFICE VISIT (OUTPATIENT)
Dept: FAMILY MEDICINE CLINIC | Facility: CLINIC | Age: 39
End: 2024-10-16
Payer: COMMERCIAL

## 2024-10-16 VITALS
DIASTOLIC BLOOD PRESSURE: 78 MMHG | RESPIRATION RATE: 16 BRPM | BODY MASS INDEX: 23.19 KG/M2 | HEART RATE: 68 BPM | WEIGHT: 126 LBS | HEIGHT: 62 IN | SYSTOLIC BLOOD PRESSURE: 126 MMHG | TEMPERATURE: 98.7 F

## 2024-10-16 DIAGNOSIS — M54.9 UPPER BACK PAIN: ICD-10-CM

## 2024-10-16 DIAGNOSIS — M79.18 MUSCLE PAIN, MYOFASCIAL: Primary | ICD-10-CM

## 2024-10-16 DIAGNOSIS — R07.89 DISCOMFORT IN CHEST: ICD-10-CM

## 2024-10-16 DIAGNOSIS — M62.89 MUSCLE TIGHTNESS: ICD-10-CM

## 2024-10-16 PROCEDURE — 99214 OFFICE O/P EST MOD 30 MIN: CPT | Performed by: FAMILY MEDICINE

## 2024-10-16 RX ORDER — PREGABALIN 100 MG/1
100 CAPSULE ORAL 2 TIMES DAILY
Qty: 60 CAPSULE | Refills: 0 | Status: SHIPPED | OUTPATIENT
Start: 2024-10-16

## 2024-10-16 NOTE — PROGRESS NOTES
Ambulatory Visit  Name: Maribel Duncan      : 1985      MRN: [id number]  Encounter Provider: Perla Caraballo MD  Encounter Date: 10/16/2024   Encounter department: [facility]    Assessment & Plan  Muscle pain, myofascial    Orders:    pregabalin (LYRICA) 100 mg capsule; Take 1 capsule (100 mg total) by mouth 2 (two) times a day    Ambulatory referral to Spine & Pain Management; Future    Discomfort in chest    Orders:    pregabalin (LYRICA) 100 mg capsule; Take 1 capsule (100 mg total) by mouth 2 (two) times a day    Ambulatory referral to Spine & Pain Management; Future    Muscle tightness    Orders:    Ambulatory referral to Spine & Pain Management; Future    Upper back pain    Orders:    Ambulatory referral to Spine & Pain Management; Future     Maribel has had a chest pain, upper/mid back pain, shortness of breath, epigastric discomfort since having COVID-19 4 years ago. Her symptoms are chronic. She has been worked up by cardiology, pulmonology, rheumatology and GI with no significant abnormalities noted on testing. Thought to have possible fibromyalgia and was trialed on cymbalta, but could not tolerate side effects. She was recently started on Lyrica 75mg BID which has not helped, but she did tolerate it. Will trial 100mg BID dose of Lyrica and have her follow up with pain management.   .She does struggle with depression/anxiety, but states it is due to her pain so does not want to go on an anti depressant.   She has follow up coming up with GI and is interested in repeating EGD/colonoscopy. Last EGD in  was normal.      History of Present Illness     HPI  She reports > 4 year history of shortness of breath, chest tightness, upper back pain, shoulder pain, epigastric abdominal pain.    Nyquil, melatonin, flexeril help her sleep.   She's been to cardiology, pulmonology, rheumatology and GI. Has had work up including EKG, echo, holter monitor, endoscopy/colonoscopy, chest xray, blood  "work with no significant abnormalities. Symptoms all began after having COVID in 2020.     Review of Systems   Constitutional: Negative.    HENT: Negative.     Eyes: Negative.    Respiratory: Negative.     Cardiovascular: Negative.    Gastrointestinal:  Positive for abdominal pain.   Endocrine: Negative.    Genitourinary: Negative.    Musculoskeletal:  Positive for arthralgias, back pain and myalgias.   Skin: Negative.    Allergic/Immunologic: Negative.    Neurological: Negative.    Hematological: Negative.    Psychiatric/Behavioral:  Positive for dysphoric mood. The patient is nervous/anxious.            Objective     /78   Pulse 68   Temp 98.7 °F (37.1 °C)   Resp 16   Ht 5' 2\" (1.575 m)   Wt 57.2 kg (126 lb)   LMP 09/26/2024 (Approximate)   BMI 23.05 kg/m²     Physical Exam  Constitutional:       General: She is not in acute distress.     Appearance: She is well-developed. She is not diaphoretic.   HENT:      Head: Normocephalic and atraumatic.   Cardiovascular:      Rate and Rhythm: Normal rate and regular rhythm.      Heart sounds: Normal heart sounds. No murmur heard.     No friction rub. No gallop.   Pulmonary:      Effort: Pulmonary effort is normal. No respiratory distress.      Breath sounds: Normal breath sounds. No wheezing or rales.   Chest:      Chest wall: No tenderness.   Musculoskeletal:         General: No deformity. Normal range of motion.      Cervical back: Normal range of motion and neck supple.   Skin:     General: Skin is warm and dry.   Neurological:      Mental Status: She is alert and oriented to person, place, and time.   Psychiatric:         Behavior: Behavior normal.         Thought Content: Thought content normal.         Judgment: Judgment normal.         "

## 2024-10-24 DIAGNOSIS — R10.13 DYSPEPSIA: ICD-10-CM

## 2024-10-24 RX ORDER — PANTOPRAZOLE SODIUM 40 MG/1
40 TABLET, DELAYED RELEASE ORAL DAILY
Qty: 30 TABLET | Refills: 0 | Status: SHIPPED | OUTPATIENT
Start: 2024-10-24

## 2024-11-01 ENCOUNTER — APPOINTMENT (OUTPATIENT)
Dept: RADIOLOGY | Facility: CLINIC | Age: 39
End: 2024-11-01
Payer: COMMERCIAL

## 2024-11-01 ENCOUNTER — CONSULT (OUTPATIENT)
Dept: PAIN MEDICINE | Facility: CLINIC | Age: 39
End: 2024-11-01
Payer: COMMERCIAL

## 2024-11-01 VITALS
SYSTOLIC BLOOD PRESSURE: 127 MMHG | BODY MASS INDEX: 23 KG/M2 | WEIGHT: 125 LBS | HEIGHT: 62 IN | DIASTOLIC BLOOD PRESSURE: 86 MMHG | HEART RATE: 89 BPM

## 2024-11-01 DIAGNOSIS — M62.89 MUSCLE TIGHTNESS: ICD-10-CM

## 2024-11-01 DIAGNOSIS — M54.50 LOW BACK PAIN, UNSPECIFIED BACK PAIN LATERALITY, UNSPECIFIED CHRONICITY, UNSPECIFIED WHETHER SCIATICA PRESENT: ICD-10-CM

## 2024-11-01 DIAGNOSIS — R07.89 DISCOMFORT IN CHEST: ICD-10-CM

## 2024-11-01 DIAGNOSIS — M54.9 UPPER BACK PAIN: ICD-10-CM

## 2024-11-01 DIAGNOSIS — G89.4 CHRONIC PAIN SYNDROME: ICD-10-CM

## 2024-11-01 DIAGNOSIS — M79.18 MUSCLE PAIN, MYOFASCIAL: Primary | ICD-10-CM

## 2024-11-01 PROCEDURE — 72100 X-RAY EXAM L-S SPINE 2/3 VWS: CPT

## 2024-11-01 PROCEDURE — 72040 X-RAY EXAM NECK SPINE 2-3 VW: CPT

## 2024-11-01 PROCEDURE — 99204 OFFICE O/P NEW MOD 45 MIN: CPT | Performed by: STUDENT IN AN ORGANIZED HEALTH CARE EDUCATION/TRAINING PROGRAM

## 2024-11-01 RX ORDER — METHOCARBAMOL 500 MG/1
500 TABLET, FILM COATED ORAL 3 TIMES DAILY
Qty: 90 TABLET | Refills: 0 | Status: SHIPPED | OUTPATIENT
Start: 2024-11-01

## 2024-11-01 NOTE — PROGRESS NOTES
Assessment:  1. Muscle pain, myofascial    2. Discomfort in chest    3. Muscle tightness    4. Upper back pain    5. Low back pain, unspecified back pain laterality, unspecified chronicity, unspecified whether sciatica present    6. Chronic pain syndrome        Plan:  Orders Placed This Encounter   Procedures    XR spine lumbar 2 or 3 views injury     Standing Status:   Future     Number of Occurrences:   1     Standing Expiration Date:   11/1/2028     Scheduling Instructions:      Bring along any outside films relating to this procedure.           Order Specific Question:   Is the patient pregnant?     Answer:   No    XR spine cervical 2 or 3 vw injury     Standing Status:   Future     Number of Occurrences:   1     Standing Expiration Date:   11/1/2028     Scheduling Instructions:      Bring along any outside films relating to this procedure.           Order Specific Question:   Is the patient pregnant?     Answer:   No    Ambulatory referral to Psych Services     Standing Status:   Future     Standing Expiration Date:   11/1/2025     Referral Priority:   Routine     Referral Type:   Consult - AMB     Referral Reason:   Specialty Services Required     Requested Specialty:   Psychiatry     Number of Visits Requested:   1     Expiration Date:   11/1/2025       New Medications Ordered This Visit   Medications    methocarbamol (ROBAXIN) 500 mg tablet     Sig: Take 1 tablet (500 mg total) by mouth 3 (three) times a day     Dispense:  90 tablet     Refill:  0       My impressions and treatment recommendations were discussed in detail with the patient, who verbalized understanding and had no further questions.    Patient is a pleasant 39-year-old presents today for evaluation management of upper back and chest pain.  I do think there is a mechanical component of this pain and is likely myofascial in nature, however I am unable to elicit her tenderness on examination.  Her cervical and lumbar radiographs were reviewed by  me in clinic.  She does have mild vertebral endplate changes in the cervical spine and her lumbar spine looks grossly normal.    We did discuss management using a biopsychosocial approach.  I do think she she does have a psychological overlap to her chronic pain syndrome.  I think she would benefit from talk therapy as well as a therapeutic exercise program.  As she did get some relief from Flexeril however it was sedating for her, I think she would benefit from a trial of Robaxin.  We did discuss that she is on Lyrica and may benefit from an up titration of her dose.  She will talk to her primary care provider about this.  I am happy to see her as needed if failure to improve and if her symptoms worsen for consideration of trigger point injections.      History of Present Illness:    Maribel Duncan is a 39 y.o. female who presents to [facility] for initial evaluation of the above stated pain complaints. The patient has a past medical and chronic pain history as outlined in the assessment section. She was referred by Perla Caraballo MD  [address] [place] [address] .    Maribel is a 39-year-old female who presents today for evaluation and management of chronic upper and chest pain.  Pain has been present for roughly 4 years.   Pain is rated as moderate.  She reports the pain is constant and worse in the evening and nighttime.  The pain is associated with burning, numbness, pressure and dullness.  Pain is located in the upper trapezius bilaterally down to the mid thoracic area.  Pain is made worse with standing and lying bending sitting and walking.  Denies any injections or surgeries.  She has done physical therapy without relief and he uses heat with some relief.  She has tried Tylenol and ibuprofen as well as Flexeril.  She is also on Lyrica.  She presents today for further evaluation.    Review of Systems:    Review of Systems   Constitutional:  Negative for  chills and fatigue.   HENT:  Negative for ear pain, mouth sores and sinus pressure.    Eyes:  Negative for pain, redness and visual disturbance.   Respiratory:  Positive for chest tightness. Negative for shortness of breath and wheezing.    Cardiovascular:  Negative for chest pain and palpitations.   Gastrointestinal:  Positive for abdominal pain and nausea.   Endocrine: Negative for polyphagia.   Musculoskeletal:  Positive for back pain, gait problem, joint swelling, neck pain and neck stiffness. Negative for arthralgias.   Skin:  Negative for wound.   Neurological:  Positive for headaches. Negative for seizures and weakness.   Psychiatric/Behavioral:  Positive for decreased concentration, dysphoric mood and sleep disturbance. The patient is nervous/anxious.            Past Medical History:   Diagnosis Date    Abdominal cramping     Anxiety 06/05/2018    Asthma     COVID-19 01/2022    headache,loss of taste/smell    Diarrhea 2020    dairy-diarrhea,vomiting    PONV (postoperative nausea and vomiting)     Psychiatric disorder     Shortness of breath     Varicella        Past Surgical History:   Procedure Laterality Date    CHOLECYSTECTOMY  2008    CONDYLOMA EXCISION/FULGURATION      SALPINGECTOMY Bilateral 07/03/2017    Procedure: SALPINGECTOMY;  Surgeon: Reinaldo Williamson MD;  Location: [facility];  Service: Gynecology    TUBAL LIGATION  2017       Family History   Problem Relation Age of Onset    Other Mother         accidental overdose    Thyroid disease unspecified Mother     Substance Abuse Mother     Depression Mother     Bipolar disorder Mother     Drug abuse Mother     Alcohol abuse Father     Cancer Father         liver cancer    No Known Problems Sister     No Known Problems Brother     No Known Problems Daughter     No Known Problems Son     Diabetes Maternal Grandmother     Heart disease Maternal Grandmother     No Known Problems Maternal Grandfather     No Known Problems Paternal Grandmother     No Known  "Problems Paternal Grandfather     Stroke Maternal Aunt     No Known Problems Maternal Uncle     No Known Problems Paternal Aunt     No Known Problems Paternal Uncle        Social History     Occupational History    Not on file   Tobacco Use    Smoking status: Never     Passive exposure: Never    Smokeless tobacco: Never   Vaping Use    Vaping status: Never Used   Substance and Sexual Activity    Alcohol use: No    Drug use: No    Sexual activity: Not Currently     Partners: Male     Birth control/protection: Abstinence     Comment: onset of intercourse 17 yrs of age and consentual / unprotected sex  / oral sex educated on STD and HPV  / lifetime partners-8  / current partner 1 yr 1 partner          Current Outpatient Medications:     cyclobenzaprine (FLEXERIL) 10 mg tablet, Take 1 tablet (10 mg total) by mouth 2 (two) times a day as needed for muscle spasms, Disp: 60 tablet, Rfl: 0    methocarbamol (ROBAXIN) 500 mg tablet, Take 1 tablet (500 mg total) by mouth 3 (three) times a day, Disp: 90 tablet, Rfl: 0    pantoprazole (PROTONIX) 40 mg tablet, Take 1 tablet by mouth once daily, Disp: 30 tablet, Rfl: 0    pregabalin (LYRICA) 100 mg capsule, Take 1 capsule (100 mg total) by mouth 2 (two) times a day, Disp: 60 capsule, Rfl: 0    Probiotic Product (PROBIOTIC DAILY PO), Take by mouth as needed, Disp: , Rfl:     Simethicone (GAS-X PO), Take by mouth if needed, Disp: , Rfl:     Allergies   Allergen Reactions    Pollen Extract Allergic Rhinitis       Physical Exam:    /86   Pulse 89   Ht 5' 2\" (1.575 m)   Wt 56.7 kg (125 lb)   LMP 09/26/2024 (Approximate)   BMI 22.86 kg/m² she has a    Constitutional: normal, well developed, well nourished, alert, in no distress and non-toxic and no overt pain behavior.  Eyes: anicteric  HEENT: grossly intact  Neck: supple, symmetric, trachea midline and no masses   Pulmonary:even and unlabored  Cardiovascular:No edema or pitting edema present  Skin:Normal without rashes or " lesions and well hydrated  Psychiatric:Mood and affect appropriate  Neurologic:Cranial Nerves II-XII grossly intact  Musculoskeletal: Range of motion in the cervical, thoracic and lumbar spine normal.  No tenderness to palpation of the paraspinals.  Upper and lower limb strength sensation reflexes are normal.    Imaging  No orders to display       Orders Placed This Encounter   Procedures    XR spine lumbar 2 or 3 views injury    XR spine cervical 2 or 3 vw injury    Ambulatory referral to Psych Services

## 2024-11-05 ENCOUNTER — TELEPHONE (OUTPATIENT)
Age: 39
End: 2024-11-05

## 2024-11-05 NOTE — TELEPHONE ENCOUNTER
Contacted Pt. in regards to  ROUTINE Referral, LVM to contact [phone] to discuss services needed at this time in order to be added to proper wait list.    Pt has [place] Medicaid and will need to be referrerd out due to insurance being OON   Patient scheduled blood pressure appointment on 4/21/18, would like labs placed at [facility].

## 2024-11-14 ENCOUNTER — OFFICE VISIT (OUTPATIENT)
Dept: GASTROENTEROLOGY | Facility: CLINIC | Age: 39
End: 2024-11-14
Payer: COMMERCIAL

## 2024-11-14 VITALS
WEIGHT: 124.8 LBS | HEIGHT: 62 IN | BODY MASS INDEX: 22.97 KG/M2 | DIASTOLIC BLOOD PRESSURE: 80 MMHG | HEART RATE: 76 BPM | SYSTOLIC BLOOD PRESSURE: 128 MMHG

## 2024-11-14 DIAGNOSIS — R10.13 DYSPEPSIA: ICD-10-CM

## 2024-11-14 DIAGNOSIS — R10.10 PAIN OF UPPER ABDOMEN: Primary | ICD-10-CM

## 2024-11-14 PROCEDURE — 99214 OFFICE O/P EST MOD 30 MIN: CPT | Performed by: INTERNAL MEDICINE

## 2024-11-14 RX ORDER — SODIUM CHLORIDE, SODIUM LACTATE, POTASSIUM CHLORIDE, CALCIUM CHLORIDE 600; 310; 30; 20 MG/100ML; MG/100ML; MG/100ML; MG/100ML
125 INJECTION, SOLUTION INTRAVENOUS CONTINUOUS
OUTPATIENT
Start: 2024-11-14

## 2024-11-14 RX ORDER — FAMOTIDINE 20 MG/1
20 TABLET, FILM COATED ORAL
COMMUNITY

## 2024-11-14 RX ORDER — PANTOPRAZOLE SODIUM 40 MG/1
40 TABLET, DELAYED RELEASE ORAL DAILY
Qty: 30 TABLET | Refills: 11 | Status: SHIPPED | OUTPATIENT
Start: 2024-11-14

## 2024-11-14 NOTE — PATIENT INSTRUCTIONS
Scheduled date of EGD(as of today): 12/4/24  Physician performing EGD: Dr. Foley  Location of EGD: [facility]  Instructions reviewed with patient by: N.M.  Clearances: LINDA

## 2024-11-14 NOTE — ASSESSMENT & PLAN NOTE
Patient has been on pantoprazole but still complaining about fullness in the epigastric area with discomfort.  Symptoms appear to be most likely functional secondary to IBS/nonulcer dyspepsia.  Rule out gastroparesis.  Patient had cholecystectomy in the past and unlikely to be biliary.    -Explained to patient in detail about different possible etiologies and given reassurance.    -Schedule EGD    -Patient was explained about the lifestyle and dietary modifications.  Advised to avoid fatty foods, chocolates, caffeine, alcohol and any other triggering foods.  Avoid eating for at least 3 hours before going to bed.    -Continue pantoprazole for now.  Discussed about long-term side effects.    -Schedule gastric emptying study

## 2024-11-14 NOTE — H&P (VIEW-ONLY)
Follow-up Note -  [facility]  Maribel Duncan 1985 female         ASSESSMENT & PLAN:    Pain of upper abdomen  Patient has been on pantoprazole but still complaining about fullness in the epigastric area with discomfort.  Symptoms appear to be most likely functional secondary to IBS/nonulcer dyspepsia.  Rule out gastroparesis.  Patient had cholecystectomy in the past and unlikely to be biliary.    -Explained to patient in detail about different possible etiologies and given reassurance.    -Schedule EGD    -Patient was explained about the lifestyle and dietary modifications.  Advised to avoid fatty foods, chocolates, caffeine, alcohol and any other triggering foods.  Avoid eating for at least 3 hours before going to bed.    -Continue pantoprazole for now.  Discussed about long-term side effects.    -Schedule gastric emptying study      Reason: Follow-up    HPI:  Ms. López was seen in July 2023 when she was recommended for EGD but she could not schedule because of other family issues.  She takes pantoprazole daily and denies any heartburn or acid reflux but complaining about some chest pain and back pain which she was told about musculoskeletal pain.  She complains about fullness with discomfort in the epigastric area after eating and feels like the food is not digesting properly.  She takes Lyrica for anxiety.  Regular bowel movements and denies any blood or mucus in the stool.    Chaperon: Ms. Bhakta    REVIEW OF SYSTEMS: Review of Systems   Constitutional:  Negative for activity change, appetite change, chills, diaphoresis, fatigue, fever and unexpected weight change.   HENT:  Negative for ear discharge, ear pain, facial swelling, hearing loss, nosebleeds, sore throat, tinnitus and voice change.    Eyes:  Negative for pain, discharge, redness, itching and visual disturbance.   Respiratory:  Negative for apnea, cough, chest tightness, shortness of breath and wheezing.     Cardiovascular:  Positive for chest pain. Negative for palpitations.   Gastrointestinal:         As noted in HPI   Endocrine: Negative for cold intolerance, heat intolerance and polyuria.   Genitourinary:  Negative for difficulty urinating, dysuria, flank pain, hematuria and urgency.   Musculoskeletal:  Positive for back pain. Negative for arthralgias, gait problem, joint swelling and myalgias.   Skin:  Negative for rash and wound.   Neurological:  Negative for dizziness, tremors, seizures, speech difficulty, light-headedness, numbness and headaches.   Hematological:  Negative for adenopathy. Does not bruise/bleed easily.   Psychiatric/Behavioral:  Negative for agitation, behavioral problems and confusion. The patient is not nervous/anxious.         Past Medical History:   Diagnosis Date    Abdominal cramping     Anxiety 06/05/2018    Asthma     COVID-19 01/2022    headache,loss of taste/smell    Diarrhea 2020    dairy-diarrhea,vomiting    PONV (postoperative nausea and vomiting)     Psychiatric disorder     Shortness of breath     Varicella       Past Surgical History:   Procedure Laterality Date    CHOLECYSTECTOMY  2008    COLONOSCOPY      CONDYLOMA EXCISION/FULGURATION      SALPINGECTOMY Bilateral 07/03/2017    Procedure: SALPINGECTOMY;  Surgeon: Reinaldo Williamson MD;  Location: [facility];  Service: Gynecology    TUBAL LIGATION  2017    UPPER GASTROINTESTINAL ENDOSCOPY       Social History     Socioeconomic History    Marital status: Single     Spouse name: Not on file    Number of children: Not on file    Years of education: Not on file    Highest education level: Not on file   Occupational History    Not on file   Tobacco Use    Smoking status: Never     Passive exposure: Never    Smokeless tobacco: Never   Vaping Use    Vaping status: Never Used   Substance and Sexual Activity    Alcohol use: No    Drug use: No    Sexual activity: Not Currently     Partners: Male     Birth control/protection: Abstinence      Comment: onset of intercourse 17 yrs of age and consentual / unprotected sex  / oral sex educated on STD and HPV  / lifetime partners-8  / current partner 1 yr 1 partner    Other Topics Concern    Not on file   Social History Narrative    Not on file     Social Drivers of Health     Financial Resource Strain: Not on file   Food Insecurity: Not on file   Transportation Needs: Not on file   Physical Activity: Not on file   Stress: Not on file   Social Connections: Not on file   Intimate Partner Violence: Not on file   Housing Stability: Not on file     Family History   Problem Relation Age of Onset    Other Mother         accidental overdose    Thyroid disease unspecified Mother     Substance Abuse Mother     Depression Mother     Bipolar disorder Mother     Drug abuse Mother     Alcohol abuse Father     Cancer Father         liver cancer    No Known Problems Sister     No Known Problems Brother     No Known Problems Daughter     No Known Problems Son     Diabetes Maternal Grandmother     Heart disease Maternal Grandmother     No Known Problems Maternal Grandfather     No Known Problems Paternal Grandmother     No Known Problems Paternal Grandfather     Stroke Maternal Aunt     No Known Problems Maternal Uncle     No Known Problems Paternal Aunt     No Known Problems Paternal Uncle      Pollen extract  Current Outpatient Medications   Medication Sig Dispense Refill    cyclobenzaprine (FLEXERIL) 10 mg tablet Take 1 tablet (10 mg total) by mouth 2 (two) times a day as needed for muscle spasms 60 tablet 0    famotidine (PEPCID) 20 mg tablet Take 20 mg by mouth daily at bedtime as needed for heartburn      methocarbamol (ROBAXIN) 500 mg tablet Take 1 tablet (500 mg total) by mouth 3 (three) times a day (Patient taking differently: Take 500 mg by mouth 3 (three) times a day Has not started yet) 90 tablet 0    pantoprazole (PROTONIX) 40 mg tablet Take 1 tablet (40 mg total) by mouth daily 30 tablet 11    pregabalin (LYRICA)  "100 mg capsule Take 1 capsule (100 mg total) by mouth 2 (two) times a day 60 capsule 0    Probiotic Product (PROBIOTIC DAILY PO) Take by mouth as needed      Simethicone (GAS-X PO) Take by mouth if needed       No current facility-administered medications for this visit.       Blood pressure 128/80, pulse 76, height 5' 2\" (1.575 m), weight 56.6 kg (124 lb 12.8 oz), last menstrual period 09/26/2024, not currently breastfeeding.    PHYSICAL EXAM: Physical Exam  Constitutional:       Appearance: Normal appearance. She is well-developed.   HENT:      Head: Normocephalic and atraumatic.      Nose: Nose normal.   Eyes:      Conjunctiva/sclera: Conjunctivae normal.   Neck:      Thyroid: No thyromegaly.      Vascular: No JVD.      Trachea: No tracheal deviation.   Cardiovascular:      Rate and Rhythm: Normal rate and regular rhythm.      Heart sounds: Normal heart sounds. No murmur heard.     No friction rub. No gallop.   Pulmonary:      Effort: Pulmonary effort is normal. No respiratory distress.      Breath sounds: Normal breath sounds. No wheezing or rales.   Abdominal:      General: Bowel sounds are normal. There is no distension.      Palpations: Abdomen is soft. There is no mass.      Tenderness: There is no abdominal tenderness. There is no guarding.      Hernia: No hernia is present.   Musculoskeletal:         General: No tenderness or deformity.      Cervical back: Neck supple.      Right lower leg: No edema.      Left lower leg: No edema.   Lymphadenopathy:      Cervical: No cervical adenopathy.   Skin:     General: Skin is warm and dry.      Findings: No erythema or rash.   Neurological:      Mental Status: She is alert and oriented to person, place, and time.   Psychiatric:         Mood and Affect: Mood normal.         Behavior: Behavior normal.         Thought Content: Thought content normal.          Lab Results   Component Value Date    WBC 7.7 05/24/2024    HGB 12.9 05/24/2024    HCT 39.8 05/24/2024    MCV " "90 05/24/2024     05/24/2024     Lab Results   Component Value Date    K 4.5 05/24/2024    CO2 21 05/24/2024     05/24/2024    BUN 11 05/24/2024    CREATININE 0.76 05/24/2024     Lab Results   Component Value Date    ALT 7 05/24/2024    AST 16 05/24/2024     No results found for: \"INR\", \"PROTIME\"    CT chest wo contrast  Result Date: 6/24/2024  Impression: No acute abnormality. Workstation performed: [id number]         "

## 2024-11-14 NOTE — PROGRESS NOTES
Follow-up Note -  [facility]  Maribel Duncan 1985 female         ASSESSMENT & PLAN:    Pain of upper abdomen  Patient has been on pantoprazole but still complaining about fullness in the epigastric area with discomfort.  Symptoms appear to be most likely functional secondary to IBS/nonulcer dyspepsia.  Rule out gastroparesis.  Patient had cholecystectomy in the past and unlikely to be biliary.    -Explained to patient in detail about different possible etiologies and given reassurance.    -Schedule EGD    -Patient was explained about the lifestyle and dietary modifications.  Advised to avoid fatty foods, chocolates, caffeine, alcohol and any other triggering foods.  Avoid eating for at least 3 hours before going to bed.    -Continue pantoprazole for now.  Discussed about long-term side effects.    -Schedule gastric emptying study      Reason: Follow-up    HPI:  Ms. López was seen in July 2023 when she was recommended for EGD but she could not schedule because of other family issues.  She takes pantoprazole daily and denies any heartburn or acid reflux but complaining about some chest pain and back pain which she was told about musculoskeletal pain.  She complains about fullness with discomfort in the epigastric area after eating and feels like the food is not digesting properly.  She takes Lyrica for anxiety.  Regular bowel movements and denies any blood or mucus in the stool.    Chaperon: Ms. Bhakta    REVIEW OF SYSTEMS: Review of Systems   Constitutional:  Negative for activity change, appetite change, chills, diaphoresis, fatigue, fever and unexpected weight change.   HENT:  Negative for ear discharge, ear pain, facial swelling, hearing loss, nosebleeds, sore throat, tinnitus and voice change.    Eyes:  Negative for pain, discharge, redness, itching and visual disturbance.   Respiratory:  Negative for apnea, cough, chest tightness, shortness of breath and wheezing.     Cardiovascular:  Positive for chest pain. Negative for palpitations.   Gastrointestinal:         As noted in HPI   Endocrine: Negative for cold intolerance, heat intolerance and polyuria.   Genitourinary:  Negative for difficulty urinating, dysuria, flank pain, hematuria and urgency.   Musculoskeletal:  Positive for back pain. Negative for arthralgias, gait problem, joint swelling and myalgias.   Skin:  Negative for rash and wound.   Neurological:  Negative for dizziness, tremors, seizures, speech difficulty, light-headedness, numbness and headaches.   Hematological:  Negative for adenopathy. Does not bruise/bleed easily.   Psychiatric/Behavioral:  Negative for agitation, behavioral problems and confusion. The patient is not nervous/anxious.         Past Medical History:   Diagnosis Date    Abdominal cramping     Anxiety 06/05/2018    Asthma     COVID-19 01/2022    headache,loss of taste/smell    Diarrhea 2020    dairy-diarrhea,vomiting    PONV (postoperative nausea and vomiting)     Psychiatric disorder     Shortness of breath     Varicella       Past Surgical History:   Procedure Laterality Date    CHOLECYSTECTOMY  2008    COLONOSCOPY      CONDYLOMA EXCISION/FULGURATION      SALPINGECTOMY Bilateral 07/03/2017    Procedure: SALPINGECTOMY;  Surgeon: Reinaldo Williamson MD;  Location: [facility];  Service: Gynecology    TUBAL LIGATION  2017    UPPER GASTROINTESTINAL ENDOSCOPY       Social History     Socioeconomic History    Marital status: Single     Spouse name: Not on file    Number of children: Not on file    Years of education: Not on file    Highest education level: Not on file   Occupational History    Not on file   Tobacco Use    Smoking status: Never     Passive exposure: Never    Smokeless tobacco: Never   Vaping Use    Vaping status: Never Used   Substance and Sexual Activity    Alcohol use: No    Drug use: No    Sexual activity: Not Currently     Partners: Male     Birth control/protection: Abstinence      Comment: onset of intercourse 17 yrs of age and consentual / unprotected sex  / oral sex educated on STD and HPV  / lifetime partners-8  / current partner 1 yr 1 partner    Other Topics Concern    Not on file   Social History Narrative    Not on file     Social Drivers of Health     Financial Resource Strain: Not on file   Food Insecurity: Not on file   Transportation Needs: Not on file   Physical Activity: Not on file   Stress: Not on file   Social Connections: Not on file   Intimate Partner Violence: Not on file   Housing Stability: Not on file     Family History   Problem Relation Age of Onset    Other Mother         accidental overdose    Thyroid disease unspecified Mother     Substance Abuse Mother     Depression Mother     Bipolar disorder Mother     Drug abuse Mother     Alcohol abuse Father     Cancer Father         liver cancer    No Known Problems Sister     No Known Problems Brother     No Known Problems Daughter     No Known Problems Son     Diabetes Maternal Grandmother     Heart disease Maternal Grandmother     No Known Problems Maternal Grandfather     No Known Problems Paternal Grandmother     No Known Problems Paternal Grandfather     Stroke Maternal Aunt     No Known Problems Maternal Uncle     No Known Problems Paternal Aunt     No Known Problems Paternal Uncle      Pollen extract  Current Outpatient Medications   Medication Sig Dispense Refill    cyclobenzaprine (FLEXERIL) 10 mg tablet Take 1 tablet (10 mg total) by mouth 2 (two) times a day as needed for muscle spasms 60 tablet 0    famotidine (PEPCID) 20 mg tablet Take 20 mg by mouth daily at bedtime as needed for heartburn      methocarbamol (ROBAXIN) 500 mg tablet Take 1 tablet (500 mg total) by mouth 3 (three) times a day (Patient taking differently: Take 500 mg by mouth 3 (three) times a day Has not started yet) 90 tablet 0    pantoprazole (PROTONIX) 40 mg tablet Take 1 tablet (40 mg total) by mouth daily 30 tablet 11    pregabalin (LYRICA)  "100 mg capsule Take 1 capsule (100 mg total) by mouth 2 (two) times a day 60 capsule 0    Probiotic Product (PROBIOTIC DAILY PO) Take by mouth as needed      Simethicone (GAS-X PO) Take by mouth if needed       No current facility-administered medications for this visit.       Blood pressure 128/80, pulse 76, height 5' 2\" (1.575 m), weight 56.6 kg (124 lb 12.8 oz), last menstrual period 09/26/2024, not currently breastfeeding.    PHYSICAL EXAM: Physical Exam  Constitutional:       Appearance: Normal appearance. She is well-developed.   HENT:      Head: Normocephalic and atraumatic.      Nose: Nose normal.   Eyes:      Conjunctiva/sclera: Conjunctivae normal.   Neck:      Thyroid: No thyromegaly.      Vascular: No JVD.      Trachea: No tracheal deviation.   Cardiovascular:      Rate and Rhythm: Normal rate and regular rhythm.      Heart sounds: Normal heart sounds. No murmur heard.     No friction rub. No gallop.   Pulmonary:      Effort: Pulmonary effort is normal. No respiratory distress.      Breath sounds: Normal breath sounds. No wheezing or rales.   Abdominal:      General: Bowel sounds are normal. There is no distension.      Palpations: Abdomen is soft. There is no mass.      Tenderness: There is no abdominal tenderness. There is no guarding.      Hernia: No hernia is present.   Musculoskeletal:         General: No tenderness or deformity.      Cervical back: Neck supple.      Right lower leg: No edema.      Left lower leg: No edema.   Lymphadenopathy:      Cervical: No cervical adenopathy.   Skin:     General: Skin is warm and dry.      Findings: No erythema or rash.   Neurological:      Mental Status: She is alert and oriented to person, place, and time.   Psychiatric:         Mood and Affect: Mood normal.         Behavior: Behavior normal.         Thought Content: Thought content normal.          Lab Results   Component Value Date    WBC 7.7 05/24/2024    HGB 12.9 05/24/2024    HCT 39.8 05/24/2024    MCV " "90 05/24/2024     05/24/2024     Lab Results   Component Value Date    K 4.5 05/24/2024    CO2 21 05/24/2024     05/24/2024    BUN 11 05/24/2024    CREATININE 0.76 05/24/2024     Lab Results   Component Value Date    ALT 7 05/24/2024    AST 16 05/24/2024     No results found for: \"INR\", \"PROTIME\"    CT chest wo contrast  Result Date: 6/24/2024  Impression: No acute abnormality. Workstation performed: [id number]         "

## 2024-11-21 ENCOUNTER — TELEPHONE (OUTPATIENT)
Age: 39
End: 2024-11-21

## 2024-11-21 NOTE — TELEPHONE ENCOUNTER
Contacted patient in regards to Routine Referral in attempts to verify patient's needs of services and add patient to proper wait list. Spoke with patient to let her know her insurance [employer] is OON and she need to contact the back of her card to find providers in her area.

## 2024-11-25 ENCOUNTER — TELEPHONE (OUTPATIENT)
Dept: GASTROENTEROLOGY | Facility: CLINIC | Age: 39
End: 2024-11-25

## 2024-11-25 NOTE — TELEPHONE ENCOUNTER
Spoke to pt confirming pt's EGD scheduled on 12/4/24 at [facility] with Dr Foley.  Informed [facility] would be calling the day prior with the arrival time.  Informed would need to be npo after midnight, could do clear liquids up to 4 hours prior to arrival time and would need a  the day of the procedure due to being under sedation.

## 2024-12-03 RX ORDER — SODIUM CHLORIDE, SODIUM LACTATE, POTASSIUM CHLORIDE, CALCIUM CHLORIDE 600; 310; 30; 20 MG/100ML; MG/100ML; MG/100ML; MG/100ML
125 INJECTION, SOLUTION INTRAVENOUS CONTINUOUS
Status: CANCELLED | OUTPATIENT
Start: 2024-12-03

## 2024-12-04 ENCOUNTER — ANESTHESIA EVENT (OUTPATIENT)
Dept: GASTROENTEROLOGY | Facility: AMBULARY SURGERY CENTER | Age: 39
End: 2024-12-04
Payer: COMMERCIAL

## 2024-12-04 ENCOUNTER — HOSPITAL ENCOUNTER (OUTPATIENT)
Dept: GASTROENTEROLOGY | Facility: AMBULARY SURGERY CENTER | Age: 39
Setting detail: OUTPATIENT SURGERY
Discharge: HOME | End: 2024-12-04
Attending: INTERNAL MEDICINE
Payer: COMMERCIAL

## 2024-12-04 VITALS
HEART RATE: 62 BPM | TEMPERATURE: 98.1 F | DIASTOLIC BLOOD PRESSURE: 74 MMHG | SYSTOLIC BLOOD PRESSURE: 107 MMHG | OXYGEN SATURATION: 99 % | RESPIRATION RATE: 18 BRPM

## 2024-12-04 DIAGNOSIS — R10.10 PAIN OF UPPER ABDOMEN: ICD-10-CM

## 2024-12-04 PROCEDURE — 43239 EGD BIOPSY SINGLE/MULTIPLE: CPT | Performed by: INTERNAL MEDICINE

## 2024-12-04 PROCEDURE — 88305 TISSUE EXAM BY PATHOLOGIST: CPT | Performed by: PATHOLOGY

## 2024-12-04 RX ORDER — SODIUM CHLORIDE, SODIUM LACTATE, POTASSIUM CHLORIDE, CALCIUM CHLORIDE 600; 310; 30; 20 MG/100ML; MG/100ML; MG/100ML; MG/100ML
INJECTION, SOLUTION INTRAVENOUS CONTINUOUS PRN
Status: DISCONTINUED | OUTPATIENT
Start: 2024-12-04 | End: 2024-12-04

## 2024-12-04 RX ORDER — PROPOFOL 10 MG/ML
INJECTION, EMULSION INTRAVENOUS AS NEEDED
Status: DISCONTINUED | OUTPATIENT
Start: 2024-12-04 | End: 2024-12-04

## 2024-12-04 RX ORDER — SODIUM CHLORIDE, SODIUM LACTATE, POTASSIUM CHLORIDE, CALCIUM CHLORIDE 600; 310; 30; 20 MG/100ML; MG/100ML; MG/100ML; MG/100ML
125 INJECTION, SOLUTION INTRAVENOUS CONTINUOUS
Status: DISCONTINUED | OUTPATIENT
Start: 2024-12-04 | End: 2024-12-08 | Stop reason: HOSPADM

## 2024-12-04 RX ORDER — SODIUM CHLORIDE, SODIUM LACTATE, POTASSIUM CHLORIDE, CALCIUM CHLORIDE 600; 310; 30; 20 MG/100ML; MG/100ML; MG/100ML; MG/100ML
125 INJECTION, SOLUTION INTRAVENOUS CONTINUOUS
Status: DISCONTINUED | OUTPATIENT
Start: 2024-12-04 | End: 2024-12-04 | Stop reason: SDUPTHER

## 2024-12-04 RX ORDER — LIDOCAINE HYDROCHLORIDE 10 MG/ML
INJECTION, SOLUTION EPIDURAL; INFILTRATION; INTRACAUDAL; PERINEURAL AS NEEDED
Status: DISCONTINUED | OUTPATIENT
Start: 2024-12-04 | End: 2024-12-04

## 2024-12-04 RX ADMIN — SODIUM CHLORIDE, SODIUM LACTATE, POTASSIUM CHLORIDE, AND CALCIUM CHLORIDE: .6; .31; .03; .02 INJECTION, SOLUTION INTRAVENOUS at 10:52

## 2024-12-04 RX ADMIN — LIDOCAINE HYDROCHLORIDE 50 MG: 10 INJECTION, SOLUTION EPIDURAL; INFILTRATION; INTRACAUDAL; PERINEURAL at 10:55

## 2024-12-04 RX ADMIN — PROPOFOL 30 MG: 10 INJECTION, EMULSION INTRAVENOUS at 10:57

## 2024-12-04 RX ADMIN — PROPOFOL 100 MG: 10 INJECTION, EMULSION INTRAVENOUS at 10:55

## 2024-12-04 NOTE — ANESTHESIA POSTPROCEDURE EVALUATION
Post-Op Assessment Note    CV Status:  Stable  Pain Score: 0    Pain management: adequate       Mental Status:  Awake   Hydration Status:  Stable   PONV Controlled:  None   Airway Patency:  Patent  Two or more mitigation strategies used for obstructive sleep apnea   Post Op Vitals Reviewed: Yes    No anethesia notable event occurred.    Staff: CRNA           Last Filed PACU Vitals:  Vitals Value Taken Time   Temp     Pulse     BP     Resp     SpO2         Modified [place]:  Activity: 2 (12/4/2024 10:06 AM)  Respiration: 2 (12/4/2024 10:06 AM)  Circulation: 2 (12/4/2024 10:06 AM)  Consciousness: 2 (12/4/2024 10:06 AM)  Oxygen Saturation: 2 (12/4/2024 10:06 AM)  Modified Keyon Score: 10 (12/4/2024 10:06 AM)

## 2024-12-04 NOTE — INTERVAL H&P NOTE
H&P reviewed. After examining the patient I find no changes in the patients condition since the H&P had been written.    Vitals:    12/04/24 1028   BP: 150/87   Pulse: 59   Resp: 18   Temp: 98.1 °F (36.7 °C)   SpO2: 100%

## 2024-12-04 NOTE — ANESTHESIA PREPROCEDURE EVALUATION
Procedure:  EGD    Relevant Problems   NEURO/PSYCH   (+) Anxiety   (+) Moderately severe major depression (HCC)      Surgery/Wound/Pain   (+) Pain of upper abdomen      Other   (+) Dyspepsia        Physical Exam    Airway    Mallampati score: I         Dental       Cardiovascular      Pulmonary      Other Findings  post-pubertal.      Anesthesia Plan  ASA Score- 2     Anesthesia Type- IV sedation with anesthesia with ASA Monitors.         Additional Monitors:     Airway Plan:            Plan Factors-    Chart reviewed.                      Induction- intravenous.    Postoperative Plan-         Informed Consent- Anesthetic plan and risks discussed with patient.  I personally reviewed this patient with the CRNA. Discussed and agreed on the Anesthesia Plan with the CRNA..

## 2024-12-06 ENCOUNTER — RESULTS FOLLOW-UP (OUTPATIENT)
Dept: GASTROENTEROLOGY | Facility: CLINIC | Age: 39
End: 2024-12-06

## 2024-12-06 PROCEDURE — 88305 TISSUE EXAM BY PATHOLOGIST: CPT | Performed by: PATHOLOGY

## 2024-12-08 DIAGNOSIS — M79.18 MUSCLE PAIN, MYOFASCIAL: ICD-10-CM

## 2024-12-08 DIAGNOSIS — R07.89 DISCOMFORT IN CHEST: ICD-10-CM

## 2024-12-09 RX ORDER — PREGABALIN 100 MG/1
100 CAPSULE ORAL 2 TIMES DAILY
Qty: 60 CAPSULE | Refills: 0 | Status: SHIPPED | OUTPATIENT
Start: 2024-12-09

## 2024-12-23 ENCOUNTER — HOSPITAL ENCOUNTER (OUTPATIENT)
Dept: RADIOLOGY | Facility: HOSPITAL | Age: 39
Discharge: HOME | End: 2024-12-23
Attending: INTERNAL MEDICINE
Payer: COMMERCIAL

## 2024-12-23 ENCOUNTER — RESULTS FOLLOW-UP (OUTPATIENT)
Dept: GASTROENTEROLOGY | Facility: AMBULARY SURGERY CENTER | Age: 39
End: 2024-12-23

## 2024-12-23 DIAGNOSIS — R10.10 PAIN OF UPPER ABDOMEN: ICD-10-CM

## 2024-12-23 PROCEDURE — 78264 GASTRIC EMPTYING IMG STUDY: CPT

## 2024-12-23 PROCEDURE — A9541 TC99M SULFUR COLLOID: HCPCS

## 2024-12-30 NOTE — TELEPHONE ENCOUNTER
Spoke with pt this morning, unfortunately the earliest appointment we have is in March at this time. Pt has been added to wait list. Pt is requesting more information on her new diagnosis and would like a call to discuss. Please advise, thank you.

## 2024-12-30 NOTE — TELEPHONE ENCOUNTER
Pt called to schedule a follow up appt. Patient is schedule on 3-5-2025 but need something sooner. Pt requesting to speak with someone in the office for a sooner date. Warm transferred to Debora clerical team for further assistance

## 2025-01-02 ENCOUNTER — OFFICE VISIT (OUTPATIENT)
Dept: GASTROENTEROLOGY | Facility: CLINIC | Age: 40
End: 2025-01-02
Payer: COMMERCIAL

## 2025-01-02 VITALS
HEIGHT: 62 IN | DIASTOLIC BLOOD PRESSURE: 89 MMHG | BODY MASS INDEX: 22.63 KG/M2 | WEIGHT: 123 LBS | SYSTOLIC BLOOD PRESSURE: 134 MMHG | HEART RATE: 64 BPM

## 2025-01-02 DIAGNOSIS — R10.13 EPIGASTRIC PAIN: ICD-10-CM

## 2025-01-02 DIAGNOSIS — K31.84 GASTROPARESIS: Primary | ICD-10-CM

## 2025-01-02 PROCEDURE — 99213 OFFICE O/P EST LOW 20 MIN: CPT | Performed by: PHYSICIAN ASSISTANT

## 2025-01-02 NOTE — ASSESSMENT & PLAN NOTE
-Gastric emptying study on December 23, 2024 shows a T 1/2 time of 155 minutes significant for mild to moderate gastroparesis.  -The normal time it takes one half of the ingested meal to leave the stomach is between 55 and 110 minutes.  -Will recommend gastroparesis style diet first  -To consider metoclopramide pending diet modification efficacy    He is willing to attempt diet modifications for now.  I did offer her referral to nutritionist but she feels comfortable with dealing with at the moment.  We will follow-up with her in 6 months

## 2025-01-02 NOTE — LETTER
2025     Perla Caraballo MD  [address]  [address]  [place] [address]    Patient: Maribel Duncan   YOB: 1985   Date of Visit: 2025       Dear Dr. Caraballo:    Thank you for referring Maribel Duncan to me for evaluation. Below are my notes for this consultation.    If you have questions, please do not hesitate to call me. I look forward to following your patient along with you.         Sincerely,        Cresencio Cosme PA-C        CC: No Recipients    Cresencio Cosme PA-C  2025  1:35 PM  Sign when Signing Visit  Name: Maribel Duncan      : 1985      MRN: [id number]  Encounter Provider: Cresencio Cosme PA-C  Encounter Date: 2025   Encounter department: [facility]  :  Assessment & Plan  Gastroparesis  -Gastric emptying study on 2024 shows a T 1/2 time of 155 minutes significant for mild to moderate gastroparesis.  -The normal time it takes one half of the ingested meal to leave the stomach is between 55 and 110 minutes.  -Will recommend gastroparesis style diet first  -To consider metoclopramide pending diet modification efficacy       Epigastric pain  -Possibly due to gastroparesis  -EGD 2024 normal with gastric biopsy being negative for H. pylori and gastric intestinal metaplasia           History of Present Illness  HPI  Maribel Duncan is a 39 y.o. female new to me with past medical history of anxiety, depression who presents for follow-up.  She saw Dr. Foley on 2024 reporting upper abdominal pain.  EGD on 2024 was normal.  Gastric biopsy was negative for H. pylori and gastric intestinal metaplasia.  Gastric emptying study was then performed on 2024 noting T 1/2 time of 155 minutes significant for mild to moderate gastroparesis.    She is not on any chronic narcotic pain medications.  She is not diabetic.        Review of  Systems   Constitutional:  Negative for activity change, appetite change, chills, diaphoresis, fatigue and unexpected weight change.   HENT:  Negative for mouth sores, sore throat and trouble swallowing.    Eyes:  Negative for pain, redness and visual disturbance.   Respiratory:  Negative for apnea, chest tightness and shortness of breath.    Cardiovascular:  Negative for chest pain and leg swelling.   Gastrointestinal:  Negative for abdominal distention, abdominal pain, anal bleeding, blood in stool, constipation, diarrhea, nausea and vomiting.   Genitourinary:  Negative for difficulty urinating, dysuria and hematuria.   Musculoskeletal:  Negative for arthralgias, back pain, gait problem, joint swelling and myalgias.   Skin:  Negative for color change, pallor and rash.   Allergic/Immunologic: Negative for environmental allergies and food allergies.   Neurological:  Negative for dizziness, weakness, light-headedness, numbness and headaches.   Psychiatric/Behavioral:  Negative for agitation and behavioral problems.      Past Medical History  Past Medical History:   Diagnosis Date   • Abdominal cramping    • Anxiety 06/05/2018   • Asthma    • COVID-19 01/2022    headache,loss of taste/smell   • Diarrhea 2020    dairy-diarrhea,vomiting   • PONV (postoperative nausea and vomiting)    • Psychiatric disorder    • Shortness of breath    • Varicella      Past Surgical History:   Procedure Laterality Date   • CHOLECYSTECTOMY  2008   • COLONOSCOPY     • CONDYLOMA EXCISION/FULGURATION     • SALPINGECTOMY Bilateral 07/03/2017    Procedure: SALPINGECTOMY;  Surgeon: Reinaldo Williamson MD;  Location: [facility];  Service: Gynecology   • TUBAL LIGATION  2017   • UPPER GASTROINTESTINAL ENDOSCOPY       Family History   Problem Relation Age of Onset   • Other Mother         accidental overdose   • Thyroid disease unspecified Mother    • Substance Abuse Mother    • Depression Mother    • Bipolar disorder Mother    • Drug abuse Mother    •  Alcohol abuse Father    • Cancer Father         liver cancer   • No Known Problems Sister    • No Known Problems Brother    • No Known Problems Daughter    • No Known Problems Son    • Diabetes Maternal Grandmother    • Heart disease Maternal Grandmother    • No Known Problems Maternal Grandfather    • No Known Problems Paternal Grandmother    • No Known Problems Paternal Grandfather    • Stroke Maternal Aunt    • No Known Problems Maternal Uncle    • No Known Problems Paternal Aunt    • No Known Problems Paternal Uncle       reports that she has never smoked. She has never been exposed to tobacco smoke. She has never used smokeless tobacco. She reports that she does not drink alcohol and does not use drugs.  Current Outpatient Medications on File Prior to Visit   Medication Sig Dispense Refill   • famotidine (PEPCID) 20 mg tablet Take 20 mg by mouth daily at bedtime as needed for heartburn     • methocarbamol (ROBAXIN) 500 mg tablet Take 1 tablet (500 mg total) by mouth 3 (three) times a day (Patient taking differently: Take 500 mg by mouth 3 (three) times a day Has not started yet) 90 tablet 0   • pantoprazole (PROTONIX) 40 mg tablet Take 1 tablet (40 mg total) by mouth daily 30 tablet 11   • pregabalin (LYRICA) 100 mg capsule Take 1 capsule by mouth twice daily 60 capsule 0   • Probiotic Product (PROBIOTIC DAILY PO) Take by mouth as needed     • Simethicone (GAS-X PO) Take by mouth if needed       No current facility-administered medications on file prior to visit.     Allergies   Allergen Reactions   • Pollen Extract Allergic Rhinitis     Current Outpatient Medications on File Prior to Visit   Medication Sig Dispense Refill   • famotidine (PEPCID) 20 mg tablet Take 20 mg by mouth daily at bedtime as needed for heartburn     • methocarbamol (ROBAXIN) 500 mg tablet Take 1 tablet (500 mg total) by mouth 3 (three) times a day (Patient taking differently: Take 500 mg by mouth 3 (three) times a day Has not started  yet) 90 tablet 0   • pantoprazole (PROTONIX) 40 mg tablet Take 1 tablet (40 mg total) by mouth daily 30 tablet 11   • pregabalin (LYRICA) 100 mg capsule Take 1 capsule by mouth twice daily 60 capsule 0   • Probiotic Product (PROBIOTIC DAILY PO) Take by mouth as needed     • Simethicone (GAS-X PO) Take by mouth if needed       No current facility-administered medications on file prior to visit.      Social History     Tobacco Use   • Smoking status: Never     Passive exposure: Never   • Smokeless tobacco: Never   Vaping Use   • Vaping status: Never Used   Substance and Sexual Activity   • Alcohol use: No   • Drug use: No   • Sexual activity: Not Currently     Partners: Male     Birth control/protection: Abstinence     Comment: onset of intercourse 17 yrs of age and consentual / unprotected sex  / oral sex educated on STD and HPV  / lifetime partners-8  / current partner 1 yr 1 partner         Objective  There were no vitals taken for this visit.     Physical Exam  Vitals reviewed.   Constitutional:       General: She is not in acute distress.     Appearance: Normal appearance. She is not ill-appearing.   HENT:      Head: Normocephalic and atraumatic.   Eyes:      General: No scleral icterus.     Conjunctiva/sclera: Conjunctivae normal.   Cardiovascular:      Rate and Rhythm: Normal rate and regular rhythm.   Pulmonary:      Effort: Pulmonary effort is normal. No respiratory distress.      Breath sounds: Normal breath sounds.   Abdominal:      General: Bowel sounds are normal. There is no distension.      Palpations: Abdomen is soft.      Tenderness: There is no abdominal tenderness. There is no guarding.   Skin:     General: Skin is warm and dry.   Neurological:      Mental Status: She is alert and oriented to person, place, and time.   Psychiatric:         Mood and Affect: Mood normal.         Behavior: Behavior normal.             Cresencio Cosme PA-C  [facility] - Gastroentrology

## 2025-01-02 NOTE — PROGRESS NOTES
Name: Maribel Duncan      : 1985      MRN: [id number]  Encounter Provider: Cresencio Cosme PA-C  Encounter Date: 2025   Encounter department: [facility] [place]  :  Assessment & Plan  Gastroparesis  -Gastric emptying study on 2024 shows a T 1/2 time of 155 minutes significant for mild to moderate gastroparesis.  -The normal time it takes one half of the ingested meal to leave the stomach is between 55 and 110 minutes.  -Will recommend gastroparesis style diet first  -To consider metoclopramide pending diet modification efficacy    He is willing to attempt diet modifications for now.  I did offer her referral to nutritionist but she feels comfortable with dealing with at the moment.  We will follow-up with her in 6 months       Epigastric pain  -Possibly due to gastroparesis  -EGD 2024 normal with gastric biopsy being negative for H. pylori and gastric intestinal metaplasia           History of Present Illness   HPI  Maribel Duncan is a 39 y.o. female new to me with past medical history of anxiety, depression who presents for follow-up.  She saw Dr. Foley on 2024 reporting upper abdominal pain.  EGD on 2024 was normal.  Gastric biopsy was negative for H. pylori and gastric intestinal metaplasia.  Gastric emptying study was then performed on 2024 noting T 1/2 time of 155 minutes significant for mild to moderate gastroparesis.    She is not on any chronic narcotic pain medications.  She is not diabetic.  At times she feels a sense of heaviness and feeling full quickly postprandially.  She has developed a lot of issues status post COVID several years ago.  She denies any change in bowel habits black or bloody stools.  She does occasionally get nauseous but has not needed to vomit.        Review of Systems   Constitutional:  Negative for activity change, appetite change, chills, diaphoresis, fatigue  and unexpected weight change.   HENT:  Negative for mouth sores, sore throat and trouble swallowing.    Eyes:  Negative for pain, redness and visual disturbance.   Respiratory:  Negative for apnea, chest tightness and shortness of breath.    Cardiovascular:  Negative for chest pain and leg swelling.   Gastrointestinal:  Negative for abdominal distention, abdominal pain, anal bleeding, blood in stool, constipation, diarrhea, nausea and vomiting.   Genitourinary:  Negative for difficulty urinating, dysuria and hematuria.   Musculoskeletal:  Negative for arthralgias, back pain, gait problem, joint swelling and myalgias.   Skin:  Negative for color change, pallor and rash.   Allergic/Immunologic: Negative for environmental allergies and food allergies.   Neurological:  Negative for dizziness, weakness, light-headedness, numbness and headaches.   Psychiatric/Behavioral:  Negative for agitation and behavioral problems.      Past Medical History   Past Medical History:   Diagnosis Date   • Abdominal cramping    • Anxiety 06/05/2018   • Asthma    • COVID-19 01/2022    headache,loss of taste/smell   • Diarrhea 2020    dairy-diarrhea,vomiting   • Gastroparesis    • PONV (postoperative nausea and vomiting)    • Psychiatric disorder    • Shortness of breath    • Varicella      Past Surgical History:   Procedure Laterality Date   • CHOLECYSTECTOMY  2008   • COLONOSCOPY     • CONDYLOMA EXCISION/FULGURATION     • SALPINGECTOMY Bilateral 07/03/2017    Procedure: SALPINGECTOMY;  Surgeon: Reinaldo Williamson MD;  Location: [facility];  Service: Gynecology   • TUBAL LIGATION  2017   • UPPER GASTROINTESTINAL ENDOSCOPY       Family History   Problem Relation Age of Onset   • Other Mother         accidental overdose   • Thyroid disease unspecified Mother    • Substance Abuse Mother    • Depression Mother    • Bipolar disorder Mother    • Drug abuse Mother    • Alcohol abuse Father    • Cancer Father         liver cancer   • No Known Problems  Sister    • No Known Problems Brother    • No Known Problems Daughter    • No Known Problems Son    • Diabetes Maternal Grandmother    • Heart disease Maternal Grandmother    • No Known Problems Maternal Grandfather    • No Known Problems Paternal Grandmother    • No Known Problems Paternal Grandfather    • Stroke Maternal Aunt    • No Known Problems Maternal Uncle    • No Known Problems Paternal Aunt    • No Known Problems Paternal Uncle       reports that she has never smoked. She has never been exposed to tobacco smoke. She has never used smokeless tobacco. She reports that she does not drink alcohol and does not use drugs.  Current Outpatient Medications on File Prior to Visit   Medication Sig Dispense Refill   • famotidine (PEPCID) 20 mg tablet Take 20 mg by mouth daily at bedtime as needed for heartburn     • pantoprazole (PROTONIX) 40 mg tablet Take 1 tablet (40 mg total) by mouth daily 30 tablet 11   • pregabalin (LYRICA) 100 mg capsule Take 1 capsule by mouth twice daily 60 capsule 0   • Probiotic Product (PROBIOTIC DAILY PO) Take by mouth as needed     • Simethicone (GAS-X PO) Take by mouth if needed     • methocarbamol (ROBAXIN) 500 mg tablet Take 1 tablet (500 mg total) by mouth 3 (three) times a day (Patient not taking: Reported on 1/2/2025) 90 tablet 0     No current facility-administered medications on file prior to visit.     Allergies   Allergen Reactions   • Pollen Extract Allergic Rhinitis      Current Outpatient Medications on File Prior to Visit   Medication Sig Dispense Refill   • famotidine (PEPCID) 20 mg tablet Take 20 mg by mouth daily at bedtime as needed for heartburn     • pantoprazole (PROTONIX) 40 mg tablet Take 1 tablet (40 mg total) by mouth daily 30 tablet 11   • pregabalin (LYRICA) 100 mg capsule Take 1 capsule by mouth twice daily 60 capsule 0   • Probiotic Product (PROBIOTIC DAILY PO) Take by mouth as needed     • Simethicone (GAS-X PO) Take by mouth if needed     • methocarbamol  "(ROBAXIN) 500 mg tablet Take 1 tablet (500 mg total) by mouth 3 (three) times a day (Patient not taking: Reported on 1/2/2025) 90 tablet 0     No current facility-administered medications on file prior to visit.      Social History     Tobacco Use   • Smoking status: Never     Passive exposure: Never   • Smokeless tobacco: Never   Vaping Use   • Vaping status: Never Used   Substance and Sexual Activity   • Alcohol use: No   • Drug use: No   • Sexual activity: Not Currently     Partners: Male     Birth control/protection: Abstinence     Comment: onset of intercourse 17 yrs of age and consentual / unprotected sex  / oral sex educated on STD and HPV  / lifetime partners-8  / current partner 1 yr 1 partner         Objective   /89 (BP Location: Left arm, Patient Position: Sitting, Cuff Size: Standard)   Pulse 64   Ht 5' 2\" (1.575 m)   Wt 55.8 kg (123 lb)   BMI 22.50 kg/m²      Physical Exam  Vitals reviewed.   Constitutional:       General: She is not in acute distress.     Appearance: Normal appearance. She is not ill-appearing.   HENT:      Head: Normocephalic and atraumatic.   Eyes:      General: No scleral icterus.     Conjunctiva/sclera: Conjunctivae normal.   Cardiovascular:      Rate and Rhythm: Normal rate and regular rhythm.   Pulmonary:      Effort: Pulmonary effort is normal. No respiratory distress.      Breath sounds: Normal breath sounds.   Abdominal:      General: Bowel sounds are normal. There is no distension.      Palpations: Abdomen is soft.      Tenderness: There is no abdominal tenderness. There is no guarding.   Skin:     General: Skin is warm and dry.   Neurological:      Mental Status: She is alert and oriented to person, place, and time.   Psychiatric:         Mood and Affect: Mood normal.         Behavior: Behavior normal.             Cresencio Cosme PA-C  [facility] - Gastroentrology    "

## 2025-01-10 DIAGNOSIS — R07.89 DISCOMFORT IN CHEST: ICD-10-CM

## 2025-01-10 DIAGNOSIS — M79.18 MUSCLE PAIN, MYOFASCIAL: ICD-10-CM

## 2025-01-10 RX ORDER — PREGABALIN 100 MG/1
100 CAPSULE ORAL 2 TIMES DAILY
Qty: 60 CAPSULE | Refills: 0 | Status: SHIPPED | OUTPATIENT
Start: 2025-01-10

## 2025-01-14 ENCOUNTER — OFFICE VISIT (OUTPATIENT)
Dept: FAMILY MEDICINE CLINIC | Facility: CLINIC | Age: 40
End: 2025-01-14
Payer: COMMERCIAL

## 2025-01-14 VITALS
RESPIRATION RATE: 17 BRPM | WEIGHT: 123.2 LBS | TEMPERATURE: 98.5 F | BODY MASS INDEX: 22.67 KG/M2 | HEART RATE: 68 BPM | SYSTOLIC BLOOD PRESSURE: 112 MMHG | DIASTOLIC BLOOD PRESSURE: 72 MMHG | HEIGHT: 62 IN

## 2025-01-14 DIAGNOSIS — N64.4 BREAST PAIN, RIGHT: Primary | ICD-10-CM

## 2025-01-14 PROCEDURE — 99214 OFFICE O/P EST MOD 30 MIN: CPT | Performed by: FAMILY MEDICINE

## 2025-01-14 NOTE — PROGRESS NOTES
"Name: Maribel Duncan      : 1985      MRN: [id number]  Encounter Provider: Perla Caraballo MD  Encounter Date: 2025   Encounter department: [facility]  :  Assessment & Plan  Breast pain, right  Possible fibroadenoma, however given that pain has been persisting despite menstrual cycle this month, will get US.   Orders:    US breast right limited (diagnostic); Future           History of Present Illness     HPI  She has been experiencing intermittent pain in her right breast on the top that radiates toward right axillary region.   No discoloration,swelling,nipple discharge, nipple retraction.   Unsure if she feels any lumps.   They usually happen a week before her menstrual cycle, but this cycle it has lasted almost the entire month continuously.   No history of breast cancer in her immediate family.   Review of Systems   Constitutional: Negative.    HENT: Negative.     Eyes: Negative.    Respiratory: Negative.     Cardiovascular: Negative.    Gastrointestinal: Negative.    Endocrine: Negative.    Genitourinary: Negative.    Musculoskeletal: Negative.    Skin: Negative.    Allergic/Immunologic: Negative.    Neurological: Negative.    Hematological: Negative.    Psychiatric/Behavioral: Negative.         Objective   /72   Pulse 68   Temp 98.5 °F (36.9 °C)   Resp 17   Ht 5' 2\" (1.575 m)   Wt 55.9 kg (123 lb 3.2 oz)   LMP 2024 (Exact Date)   BMI 22.53 kg/m²      Physical Exam  Constitutional:       General: She is not in acute distress.     Appearance: She is well-developed. She is not diaphoretic.   HENT:      Head: Normocephalic and atraumatic.   Cardiovascular:      Rate and Rhythm: Normal rate and regular rhythm.      Heart sounds: Normal heart sounds. No murmur heard.     No friction rub. No gallop.   Pulmonary:      Effort: Pulmonary effort is normal. No respiratory distress.      Breath sounds: Normal breath sounds. No wheezing or rales.   Chest:      Chest wall: No " tenderness.   Breasts:     Right: Normal. No swelling, bleeding, inverted nipple, mass, nipple discharge, skin change or tenderness.      Left: Normal. No swelling, bleeding, inverted nipple, mass, nipple discharge, skin change or tenderness.   Musculoskeletal:         General: No deformity. Normal range of motion.      Cervical back: Normal range of motion and neck supple.   Lymphadenopathy:      Upper Body:      Right upper body: No axillary adenopathy.      Left upper body: No axillary adenopathy.   Skin:     General: Skin is warm and dry.   Neurological:      Mental Status: She is alert and oriented to person, place, and time.   Psychiatric:         Behavior: Behavior normal.         Thought Content: Thought content normal.         Judgment: Judgment normal.

## 2025-01-26 ENCOUNTER — OFFICE VISIT (OUTPATIENT)
Dept: URGENT CARE | Facility: CLINIC | Age: 40
End: 2025-01-26
Payer: COMMERCIAL

## 2025-01-26 VITALS
OXYGEN SATURATION: 97 % | RESPIRATION RATE: 16 BRPM | BODY MASS INDEX: 22.75 KG/M2 | HEART RATE: 80 BPM | TEMPERATURE: 99.7 F | WEIGHT: 124.4 LBS | SYSTOLIC BLOOD PRESSURE: 107 MMHG | DIASTOLIC BLOOD PRESSURE: 81 MMHG

## 2025-01-26 DIAGNOSIS — M94.0 COSTOCHONDRITIS: ICD-10-CM

## 2025-01-26 DIAGNOSIS — R68.89 FLU-LIKE SYMPTOMS: Primary | ICD-10-CM

## 2025-01-26 PROCEDURE — 99213 OFFICE O/P EST LOW 20 MIN: CPT | Performed by: FAMILY MEDICINE

## 2025-01-26 PROCEDURE — 87636 SARSCOV2 & INF A&B AMP PRB: CPT | Performed by: FAMILY MEDICINE

## 2025-01-26 RX ORDER — PREDNISONE 10 MG/1
10 TABLET ORAL DAILY
Qty: 7 TABLET | Refills: 0 | Status: SHIPPED | OUTPATIENT
Start: 2025-01-26 | End: 2025-02-02

## 2025-01-26 RX ORDER — BENZONATATE 200 MG/1
200 CAPSULE ORAL 3 TIMES DAILY PRN
Qty: 20 CAPSULE | Refills: 0 | Status: SHIPPED | OUTPATIENT
Start: 2025-01-26

## 2025-01-26 RX ORDER — FLUTICASONE PROPIONATE 50 MCG
1 SPRAY, SUSPENSION (ML) NASAL 2 TIMES DAILY
Qty: 16 G | Refills: 0 | Status: SHIPPED | OUTPATIENT
Start: 2025-01-26

## 2025-01-26 RX ORDER — OSELTAMIVIR PHOSPHATE 75 MG/1
75 CAPSULE ORAL EVERY 12 HOURS SCHEDULED
Qty: 10 CAPSULE | Refills: 0 | Status: SHIPPED | OUTPATIENT
Start: 2025-01-26 | End: 2025-01-31

## 2025-01-26 NOTE — PROGRESS NOTES
"  [facility]        NAME: Maribel Duncan is a 39 y.o. female  : 1985    MRN: [id number]  DATE: 2025  TIME: 2:59 PM    Assessment and Plan   Flu-like symptoms [R68.89]  1. Flu-like symptoms  oseltamivir (TAMIFLU) 75 mg capsule    fluticasone (FLONASE) 50 mcg/act nasal spray    benzonatate (TESSALON) 200 MG capsule    Covid/Flu-Office Collect      2. Costochondritis  predniSONE 10 mg tablet        COVID and flu PCR pending.  Will preemptively treat for influenza with Tamiflu.  Flonase and Tessalon Perles to counteract symptoms.  Patient advised on supportive measures including hydrating with plenty fluids and gargling with warm salt water.    Has a with a chronic chest and upper back pain likely secondary to inflammatory process.  Will treat with a low-dose steroid for the next 1 week as a trial.    Patient Instructions     Follow up with PCP in 3-5 days.  Proceed to  ER if symptoms worsen.    If tests have been performed at [facility], our office will contact you with results if changes need to be made to the care plan discussed with you at the visit.  You can review your full results on [facility] MyChart.    Chief Complaint     Chief Complaint   Patient presents with    Generalized Body Aches     Reports body aches, chill, headache, vomiting, fever up to 101.3 F, cough , ears feeling \"foggy\" x 3 days. Using DayQuil, NyQuil, ibuprofen.          History of Present Illness       39-year-old female presents today with about 2 days of flulike symptoms including fatigue, headaches, anorexia, chills, fevers up to 101.3 degrees, dry cough, GI symptoms including nausea, vomiting among other symptoms.  Reports that a lot of people in school have been diagnosed with the flu.    Generalized Body Aches  Associated symptoms include headaches, rhinorrhea, fatigue, a fever (101.3), coughing (dry), nausea and vomiting. Pertinent negatives include no congestion, chest pain or shortness of breath. "       Review of Systems   Review of Systems   Constitutional:  Positive for appetite change, chills, fatigue and fever (101.3).   HENT:  Positive for rhinorrhea. Negative for congestion.    Respiratory:  Positive for cough (dry). Negative for shortness of breath.    Cardiovascular:  Negative for chest pain.   Gastrointestinal:  Positive for nausea and vomiting.   Musculoskeletal:  Positive for myalgias.   Neurological:  Positive for weakness and headaches.     Current Medications       Current Outpatient Medications:     benzonatate (TESSALON) 200 MG capsule, Take 1 capsule (200 mg total) by mouth 3 (three) times a day as needed for cough, Disp: 20 capsule, Rfl: 0    famotidine (PEPCID) 20 mg tablet, Take 20 mg by mouth daily at bedtime as needed for heartburn, Disp: , Rfl:     fluticasone (FLONASE) 50 mcg/act nasal spray, 1 spray into each nostril 2 (two) times a day, Disp: 16 g, Rfl: 0    oseltamivir (TAMIFLU) 75 mg capsule, Take 1 capsule (75 mg total) by mouth every 12 (twelve) hours for 5 days, Disp: 10 capsule, Rfl: 0    pantoprazole (PROTONIX) 40 mg tablet, Take 1 tablet (40 mg total) by mouth daily, Disp: 30 tablet, Rfl: 11    predniSONE 10 mg tablet, Take 1 tablet (10 mg total) by mouth daily for 7 days, Disp: 7 tablet, Rfl: 0    pregabalin (LYRICA) 100 mg capsule, Take 1 capsule by mouth twice daily, Disp: 60 capsule, Rfl: 0    Probiotic Product (PROBIOTIC DAILY PO), Take by mouth as needed, Disp: , Rfl:     Simethicone (GAS-X PO), Take by mouth if needed, Disp: , Rfl:     methocarbamol (ROBAXIN) 500 mg tablet, Take 1 tablet (500 mg total) by mouth 3 (three) times a day (Patient not taking: Reported on 1/2/2025), Disp: 90 tablet, Rfl: 0    Current Allergies     Allergies as of 01/26/2025 - Reviewed 01/26/2025   Allergen Reaction Noted    Pollen extract Allergic Rhinitis 03/23/2016            The following portions of the patient's history were reviewed and updated as appropriate: allergies, current  medications, past family history, past medical history, past social history, past surgical history and problem list.     Past Medical History:   Diagnosis Date    Abdominal cramping     Anxiety 06/05/2018    Asthma     COVID-19 01/2022    headache,loss of taste/smell    Diarrhea 2020    dairy-diarrhea,vomiting    Gastroparesis     PONV (postoperative nausea and vomiting)     Psychiatric disorder     Shortness of breath     Varicella        Past Surgical History:   Procedure Laterality Date    CHOLECYSTECTOMY  2008    COLONOSCOPY      CONDYLOMA EXCISION/FULGURATION      SALPINGECTOMY Bilateral 07/03/2017    Procedure: SALPINGECTOMY;  Surgeon: Reinaldo Williamson MD;  Location: [facility];  Service: Gynecology    TUBAL LIGATION  2017    UPPER GASTROINTESTINAL ENDOSCOPY         Family History   Problem Relation Age of Onset    Other Mother         accidental overdose    Thyroid disease unspecified Mother     Substance Abuse Mother     Depression Mother     Bipolar disorder Mother     Drug abuse Mother     Alcohol abuse Father     Cancer Father         liver cancer    No Known Problems Sister     No Known Problems Brother     No Known Problems Daughter     No Known Problems Son     Diabetes Maternal Grandmother     Heart disease Maternal Grandmother     No Known Problems Maternal Grandfather     No Known Problems Paternal Grandmother     No Known Problems Paternal Grandfather     Stroke Maternal Aunt     No Known Problems Maternal Uncle     No Known Problems Paternal Aunt     No Known Problems Paternal Uncle          Medications have been verified.        Objective   /81   Pulse 80   Temp 99.7 °F (37.6 °C) (Tympanic)   Resp 16   Wt 56.4 kg (124 lb 6.4 oz)   LMP 01/18/2025 (Approximate)   SpO2 97%   BMI 22.75 kg/m²   Patient's last menstrual period was 01/18/2025 (approximate).       Physical Exam     Physical Exam  Vitals and nursing note reviewed.   Constitutional:       General: She is in acute distress.       Appearance: Normal appearance. She is normal weight. She is not ill-appearing, toxic-appearing or diaphoretic.   HENT:      Head: Normocephalic and atraumatic.      Right Ear: Tympanic membrane, ear canal and external ear normal. There is no impacted cerumen.      Left Ear: Tympanic membrane, ear canal and external ear normal. There is no impacted cerumen.      Nose: Nose normal.      Mouth/Throat:      Mouth: Mucous membranes are moist.      Pharynx: No posterior oropharyngeal erythema.   Eyes:      General:         Right eye: No discharge.         Left eye: No discharge.      Conjunctiva/sclera: Conjunctivae normal.   Cardiovascular:      Rate and Rhythm: Normal rate and regular rhythm.   Pulmonary:      Effort: Pulmonary effort is normal. No respiratory distress.      Breath sounds: Normal breath sounds. No wheezing, rhonchi or rales.   Chest:      Chest wall: No tenderness.   Musculoskeletal:         General: No tenderness.   Skin:     General: Skin is warm.      Findings: No erythema.   Neurological:      General: No focal deficit present.      Mental Status: She is alert and oriented to person, place, and time.      Motor: No weakness.   Psychiatric:         Mood and Affect: Mood normal.         Behavior: Behavior normal.         Thought Content: Thought content normal.         Judgment: Judgment normal.

## 2025-01-27 LAB
FLUAV RNA RESP QL NAA+PROBE: POSITIVE
FLUBV RNA RESP QL NAA+PROBE: NEGATIVE
SARS-COV-2 RNA RESP QL NAA+PROBE: NEGATIVE

## 2025-02-11 DIAGNOSIS — R07.89 DISCOMFORT IN CHEST: ICD-10-CM

## 2025-02-11 DIAGNOSIS — M79.18 MUSCLE PAIN, MYOFASCIAL: ICD-10-CM

## 2025-02-12 RX ORDER — PREGABALIN 100 MG/1
100 CAPSULE ORAL 2 TIMES DAILY
Qty: 60 CAPSULE | Refills: 0 | Status: SHIPPED | OUTPATIENT
Start: 2025-02-12

## 2025-03-11 ENCOUNTER — TELEPHONE (OUTPATIENT)
Age: 40
End: 2025-03-11

## 2025-03-11 ENCOUNTER — NURSE TRIAGE (OUTPATIENT)
Age: 40
End: 2025-03-11

## 2025-03-11 NOTE — TELEPHONE ENCOUNTER
Pt called to get a sooner appt than 7-. I advised pt that the soonest appt we have is 7-. Pt stated she has gastro paresis and has gerd and sourness. Warm transferred overt to triage nurse for further assistance

## 2025-03-11 NOTE — TELEPHONE ENCOUNTER
"Pt transferred to myself by [place].    FOLLOW UP: DIEGO    REASON FOR CONVERSATION: Bloated    SYMPTOMS: Abdominal distention/bloat, nausea,  \"sourness in my throat\", upper back pressure/pain, hard abdomen.    OTHER: Symptoms have been worsening over the past few weeks. Pt states she becomes bloated after drinking or eating. She is consuming small meals. Pt stopped taking pantoprazole 1 day ago because it was not providing relief of symptoms. Currently taking Pepcid 40 mg BID. Urgent OV scheduled 03/20 with .    DISPOSITION: See Within 2 Weeks in Office    Reason for Disposition   MILD SWELLING of abdomen (e.g., looks distended or swollen) that is NEW-ONSET or getting worse    Answer Assessment - Initial Assessment Questions  1. SYMPTOM: \"What's the main symptom you're concerned about?\" (e.g., abdomen bloating, swelling)      Bloat, \"sour throat\"  2. ONSET: \"When did  start?\"      Few weeks ago  3. SEVERITY: \"How bad is the bloating or swelling?\"      7/10  4. ABDOMEN PAIN:  \"Is there any abdomen pain?\" If Yes, ask: \"How bad is the pain?\"  (e.g., Scale 1-10; mild, moderate, or severe)      denies  5. RELIEVING AND AGGRAVATING FACTORS: \"What makes it better or worse?\" (e.g., certain foods, lactose, medicines)      Eating/drinking  6. GI HISTORY: \"Do you have any history of stomach or intestine problems?\" (e.g., bowel obstruction, cancer, irritable bowel)       Gastroparesis   7. CAUSE: \"What do you think is causing the bloating?\"       Fells may be GERD related  8. OTHER SYMPTOMS: \"Do you have any other symptoms?\" (e.g., belching, blood in stool, breathing difficulty, constipation, diarrhea, fever, passing gas, vomiting, weight loss, white of eyes have turned yellow)      denies  9. PREGNANCY: \"Is there any chance you are pregnant?\" \"When was your last menstrual period?\"      N/a    Protocols used: Abdomen Bloating and Swelling-Adult-[place]    "

## 2025-03-12 ENCOUNTER — HOSPITAL ENCOUNTER (OUTPATIENT)
Dept: RADIOLOGY | Facility: HOSPITAL | Age: 40
Discharge: HOME | End: 2025-03-12
Attending: FAMILY MEDICINE
Payer: COMMERCIAL

## 2025-03-12 ENCOUNTER — RESULTS FOLLOW-UP (OUTPATIENT)
Dept: FAMILY MEDICINE CLINIC | Facility: CLINIC | Age: 40
End: 2025-03-12

## 2025-03-12 DIAGNOSIS — N64.4 BREAST PAIN, RIGHT: ICD-10-CM

## 2025-03-12 PROCEDURE — 76642 ULTRASOUND BREAST LIMITED: CPT

## 2025-03-12 PROCEDURE — G0279 TOMOSYNTHESIS, MAMMO: HCPCS

## 2025-03-12 PROCEDURE — 77066 DX MAMMO INCL CAD BI: CPT

## 2025-03-16 DIAGNOSIS — M79.18 MUSCLE PAIN, MYOFASCIAL: ICD-10-CM

## 2025-03-16 DIAGNOSIS — R07.89 DISCOMFORT IN CHEST: ICD-10-CM

## 2025-03-17 RX ORDER — PREGABALIN 100 MG/1
100 CAPSULE ORAL 2 TIMES DAILY
Qty: 60 CAPSULE | Refills: 0 | Status: SHIPPED | OUTPATIENT
Start: 2025-03-17

## 2025-03-20 ENCOUNTER — OFFICE VISIT (OUTPATIENT)
Age: 40
End: 2025-03-20
Payer: COMMERCIAL

## 2025-03-20 VITALS
HEIGHT: 62 IN | WEIGHT: 125.8 LBS | SYSTOLIC BLOOD PRESSURE: 139 MMHG | HEART RATE: 78 BPM | BODY MASS INDEX: 23.15 KG/M2 | DIASTOLIC BLOOD PRESSURE: 81 MMHG

## 2025-03-20 DIAGNOSIS — K31.84 GASTROPARESIS: Primary | ICD-10-CM

## 2025-03-20 PROCEDURE — 99214 OFFICE O/P EST MOD 30 MIN: CPT | Performed by: INTERNAL MEDICINE

## 2025-03-20 RX ORDER — ONDANSETRON 4 MG/1
4 TABLET, ORALLY DISINTEGRATING ORAL EVERY 6 HOURS PRN
Qty: 20 TABLET | Refills: 1 | Status: SHIPPED | OUTPATIENT
Start: 2025-03-20

## 2025-03-20 NOTE — PROGRESS NOTES
Name: Maribel Duncan      : 1985      MRN: [id number]  Encounter Provider: Young Foley MD  Encounter Date: 3/20/2025   Encounter department: [facility]  :  Assessment & Plan  Gastroparesis    Patient was recently diagnosed with gastroparesis and reports having occasional flareups.  She made this appointment last week when she had more symptoms.  She reports doing well since she changed taking pantoprazole to bedtime.    -Discussed in detail regarding the gastroparesis and the treatment plan    -Low-fat frequent meals    -Continue pantoprazole regularly.    -Will put her on Zofran as needed    -Patient was explained about the lifestyle and dietary modifications.  Advised to avoid fatty foods, chocolates, caffeine, alcohol and any other triggering foods.  Avoid eating for at least 3 hours before going to bed.    Orders:    ondansetron (ZOFRAN-ODT) 4 mg disintegrating tablet; Take 1 tablet (4 mg total) by mouth every 6 (six) hours as needed for nausea or vomiting        History of Present Illness   Maribel Duncan is a 39 y.o. female who presents for follow-up because of her symptoms last week.  Patient was recently diagnosed with gastroparesis and had more symptoms last week when she made this appointment.  She feels a lot better since then and denies any more abdominal pain, nausea or vomiting.  She also reports that she changed taking pantoprazole to bedtime.  Good appetite, no recent weight loss.  Denies any blood or mucus in the stool.  Regular bowel movements.    Chaperon: Ms. Mcnally      Review of Systems A complete review of systems is negative other than that noted above in the HPI.      Current Outpatient Medications   Medication Sig Dispense Refill    famotidine (PEPCID) 20 mg tablet Take 20 mg by mouth daily at bedtime as needed for heartburn      pantoprazole (PROTONIX) 40 mg tablet Take 1 tablet (40 mg total) by mouth daily 30 tablet 11    pregabalin  "(LYRICA) 100 mg capsule Take 1 capsule by mouth twice daily 60 capsule 0    Simethicone (GAS-X PO) Take by mouth if needed      benzonatate (TESSALON) 200 MG capsule Take 1 capsule (200 mg total) by mouth 3 (three) times a day as needed for cough 20 capsule 0    fluticasone (FLONASE) 50 mcg/act nasal spray 1 spray into each nostril 2 (two) times a day 16 g 0    methocarbamol (ROBAXIN) 500 mg tablet Take 1 tablet (500 mg total) by mouth 3 (three) times a day (Patient not taking: Reported on 1/2/2025) 90 tablet 0    Probiotic Product (PROBIOTIC DAILY PO) Take by mouth as needed       No current facility-administered medications for this visit.     Objective   /81 (BP Location: Left arm, Patient Position: Sitting, Cuff Size: Standard)   Pulse 78   Ht 5' 2\" (1.575 m)   Wt 57.1 kg (125 lb 12.8 oz)   LMP 02/17/2025   BMI 23.01 kg/m²     Physical Exam  Constitutional:       Appearance: Normal appearance. She is well-developed.   HENT:      Head: Normocephalic and atraumatic.      Nose: Nose normal.   Eyes:      Conjunctiva/sclera: Conjunctivae normal.   Neck:      Thyroid: No thyromegaly.      Vascular: No JVD.      Trachea: No tracheal deviation.   Cardiovascular:      Rate and Rhythm: Normal rate and regular rhythm.      Heart sounds: Normal heart sounds. No murmur heard.     No friction rub. No gallop.   Pulmonary:      Effort: Pulmonary effort is normal. No respiratory distress.      Breath sounds: Normal breath sounds. No wheezing or rales.   Abdominal:      General: Bowel sounds are normal. There is no distension.      Palpations: Abdomen is soft. There is no mass.      Tenderness: There is no abdominal tenderness. There is no guarding.      Hernia: No hernia is present.   Musculoskeletal:         General: No tenderness or deformity.      Cervical back: Neck supple.      Right lower leg: No edema.      Left lower leg: No edema.   Lymphadenopathy:      Cervical: No cervical adenopathy.   Skin:     General: " Skin is warm and dry.      Findings: No erythema or rash.   Neurological:      Mental Status: She is alert and oriented to person, place, and time.   Psychiatric:         Mood and Affect: Mood normal.         Behavior: Behavior normal.         Thought Content: Thought content normal.          Lab Results: I personally reviewed relevant lab results.       Results for orders placed during the hospital encounter of 12/04/24    EGD    Impression  The esophagus appeared normal.  The stomach appeared normal. Performed random biopsy.  The duodenum appeared normal.      RECOMMENDATION:  Await pathology results  Schedule gastric emptying study            Young Foley MD

## 2025-03-20 NOTE — ASSESSMENT & PLAN NOTE
Patient was recently diagnosed with gastroparesis and reports having occasional flareups.  She made this appointment last week when she had more symptoms.  She reports doing well since she changed taking pantoprazole to bedtime.    -Discussed in detail regarding the gastroparesis and the treatment plan    -Low-fat frequent meals    -Continue pantoprazole regularly.    -Will put her on Zofran as needed    -Patient was explained about the lifestyle and dietary modifications.  Advised to avoid fatty foods, chocolates, caffeine, alcohol and any other triggering foods.  Avoid eating for at least 3 hours before going to bed.    Orders:    ondansetron (ZOFRAN-ODT) 4 mg disintegrating tablet; Take 1 tablet (4 mg total) by mouth every 6 (six) hours as needed for nausea or vomiting

## 2025-04-16 DIAGNOSIS — R07.89 DISCOMFORT IN CHEST: ICD-10-CM

## 2025-04-16 DIAGNOSIS — M79.18 MUSCLE PAIN, MYOFASCIAL: ICD-10-CM

## 2025-04-17 RX ORDER — PREGABALIN 100 MG/1
100 CAPSULE ORAL 2 TIMES DAILY
Qty: 60 CAPSULE | Refills: 0 | Status: SHIPPED | OUTPATIENT
Start: 2025-04-17

## 2025-05-20 DIAGNOSIS — R07.89 DISCOMFORT IN CHEST: ICD-10-CM

## 2025-05-20 DIAGNOSIS — M79.18 MUSCLE PAIN, MYOFASCIAL: ICD-10-CM

## 2025-05-21 RX ORDER — PREGABALIN 100 MG/1
100 CAPSULE ORAL 2 TIMES DAILY
Qty: 60 CAPSULE | Refills: 0 | Status: SHIPPED | OUTPATIENT
Start: 2025-05-21

## 2025-06-21 DIAGNOSIS — M79.18 MUSCLE PAIN, MYOFASCIAL: ICD-10-CM

## 2025-06-21 DIAGNOSIS — R07.89 DISCOMFORT IN CHEST: ICD-10-CM

## 2025-06-23 RX ORDER — PREGABALIN 100 MG/1
100 CAPSULE ORAL 2 TIMES DAILY
Qty: 60 CAPSULE | Refills: 0 | Status: SHIPPED | OUTPATIENT
Start: 2025-06-23

## 2025-07-01 ENCOUNTER — OFFICE VISIT (OUTPATIENT)
Dept: FAMILY MEDICINE CLINIC | Facility: CLINIC | Age: 40
End: 2025-07-01
Payer: COMMERCIAL

## 2025-07-01 VITALS
SYSTOLIC BLOOD PRESSURE: 120 MMHG | HEIGHT: 62 IN | TEMPERATURE: 98.2 F | OXYGEN SATURATION: 98 % | WEIGHT: 129.6 LBS | DIASTOLIC BLOOD PRESSURE: 78 MMHG | BODY MASS INDEX: 23.85 KG/M2 | RESPIRATION RATE: 16 BRPM | HEART RATE: 69 BPM

## 2025-07-01 DIAGNOSIS — Z13.0 SCREENING FOR DEFICIENCY ANEMIA: ICD-10-CM

## 2025-07-01 DIAGNOSIS — Z13.6 SCREENING FOR CARDIOVASCULAR, RESPIRATORY, AND GENITOURINARY DISEASES: ICD-10-CM

## 2025-07-01 DIAGNOSIS — Z00.00 ANNUAL PHYSICAL EXAM: Primary | ICD-10-CM

## 2025-07-01 DIAGNOSIS — Z13.29 SCREENING FOR THYROID DISORDER: ICD-10-CM

## 2025-07-01 DIAGNOSIS — Z13.89 SCREENING FOR CARDIOVASCULAR, RESPIRATORY, AND GENITOURINARY DISEASES: ICD-10-CM

## 2025-07-01 DIAGNOSIS — Z13.83 SCREENING FOR CARDIOVASCULAR, RESPIRATORY, AND GENITOURINARY DISEASES: ICD-10-CM

## 2025-07-01 DIAGNOSIS — M79.18 MUSCLE PAIN, MYOFASCIAL: ICD-10-CM

## 2025-07-01 DIAGNOSIS — R07.89 DISCOMFORT IN CHEST: ICD-10-CM

## 2025-07-01 PROCEDURE — 99395 PREV VISIT EST AGE 18-39: CPT | Performed by: FAMILY MEDICINE

## 2025-07-01 NOTE — PROGRESS NOTES
Adult Annual Physical  Name: Maribel Duncan      : 1985      MRN: [id number]  Encounter Provider: Perla Caraballo MD  Encounter Date: 2025   Encounter department: [facility]    :  Assessment & Plan  Annual physical exam         Screening for deficiency anemia    Orders:    CBC and differential; Future    Screening for cardiovascular, respiratory, and genitourinary diseases    Orders:    Comprehensive metabolic panel; Future    Lipid Panel with Direct LDL reflex; Future    Screening for thyroid disorder    Orders:    TSH, 3rd generation with Free T4 reflex; Future    Discomfort in chest  She has had chronic chest wall discomfort and pain. Feels like she has trouble breathing. CT chest with no abnormalities.   She did PT, saw pulmonary, rheumatology, cardiology, pain management, GI.   Has had cardiac work up done as well as EGD with no significant abnormalities.   Orders:    MRI chest wall wo contrast; Future    Muscle pain, myofascial    Orders:    MRI chest wall wo contrast; Future        Preventive Screenings:  - Diabetes Screening: risks/benefits discussed and orders placed  - Cholesterol Screening: risks/benefits discussed and orders placed   - Hepatitis C screening: screening up-to-date   - HIV screening: screening up-to-date   - Cervical cancer screening: screening up-to-date   - Colon cancer screening: screening up-to-date   - Lung cancer screening: screening not indicated       Depression Screening and Follow-up Plan: Patient was screened for depression during today's encounter. They screened negative with a PHQ-9 score of 0.          History of Present Illness     Adult Annual Physical:  Patient presents for annual physical.     Diet and Physical Activity:  - Diet/Nutrition: no special diet.  - Exercise: walking, 30-60 minutes on average and 5-7 times a week on average.    Depression Screening:    - PHQ-9 Score: 0    General Health:  - Sleep: 7-8 hours of sleep on average and  "sleeps well.  - Hearing: normal hearing bilateral ears.  - Vision: no vision problems and wears glasses.  - Dental: regular dental visits.    /GYN Health:  - Follows with GYN: no.     Review of Systems   Constitutional: Negative.    HENT: Negative.     Eyes: Negative.    Respiratory: Negative.     Cardiovascular: Negative.    Gastrointestinal: Negative.    Endocrine: Negative.    Genitourinary: Negative.    Musculoskeletal: Negative.    Skin: Negative.    Allergic/Immunologic: Negative.    Neurological: Negative.    Hematological: Negative.    Psychiatric/Behavioral: Negative.           Objective   /78   Pulse 69   Temp 98.2 °F (36.8 °C)   Resp 16   Ht 5' 2\" (1.575 m)   Wt 58.8 kg (129 lb 9.6 oz)   SpO2 98%   BMI 23.70 kg/m²     Physical Exam  Vitals and nursing note reviewed.   Constitutional:       General: She is not in acute distress.     Appearance: She is well-developed.   HENT:      Head: Normocephalic and atraumatic.      Right Ear: Tympanic membrane, ear canal and external ear normal. There is no impacted cerumen.      Left Ear: Tympanic membrane, ear canal and external ear normal. There is no impacted cerumen.      Nose: Nose normal.      Mouth/Throat:      Mouth: Mucous membranes are moist.     Eyes:      Conjunctiva/sclera: Conjunctivae normal.       Cardiovascular:      Rate and Rhythm: Normal rate and regular rhythm.      Heart sounds: No murmur heard.  Pulmonary:      Effort: Pulmonary effort is normal. No respiratory distress.      Breath sounds: Normal breath sounds.   Abdominal:      General: Abdomen is flat. There is no distension.      Palpations: Abdomen is soft. There is no mass.      Tenderness: There is no abdominal tenderness. There is no guarding or rebound.      Hernia: No hernia is present.     Musculoskeletal:         General: Normal range of motion.      Cervical back: Neck supple.     Skin:     General: Skin is warm and dry.     Neurological:      General: No focal " deficit present.      Mental Status: She is alert and oriented to person, place, and time.

## 2025-07-03 LAB
ALBUMIN SERPL-MCNC: 4.5 G/DL (ref 3.9–4.9)
ALP SERPL-CCNC: 69 IU/L (ref 44–121)
ALT SERPL-CCNC: 13 IU/L (ref 0–32)
AST SERPL-CCNC: 19 IU/L (ref 0–40)
BASOPHILS # BLD AUTO: 0.1 X10E3/UL (ref 0–0.2)
BASOPHILS NFR BLD AUTO: 1 %
BILIRUB SERPL-MCNC: 0.2 MG/DL (ref 0–1.2)
BUN SERPL-MCNC: 10 MG/DL (ref 6–20)
BUN/CREAT SERPL: 13 (ref 9–23)
CALCIUM SERPL-MCNC: 9 MG/DL (ref 8.7–10.2)
CHLORIDE SERPL-SCNC: 103 MMOL/L (ref 96–106)
CHOLEST SERPL-MCNC: 169 MG/DL (ref 100–199)
CO2 SERPL-SCNC: 20 MMOL/L (ref 20–29)
CREAT SERPL-MCNC: 0.77 MG/DL (ref 0.57–1)
EGFR: 102 ML/MIN/1.73
EOSINOPHIL # BLD AUTO: 0.1 X10E3/UL (ref 0–0.4)
EOSINOPHIL NFR BLD AUTO: 1 %
ERYTHROCYTE [DISTWIDTH] IN BLOOD BY AUTOMATED COUNT: 13.6 % (ref 11.7–15.4)
GLOBULIN SER-MCNC: 2.3 G/DL (ref 1.5–4.5)
GLUCOSE SERPL-MCNC: 87 MG/DL (ref 70–99)
HCT VFR BLD AUTO: 38.1 % (ref 34–46.6)
HDLC SERPL-MCNC: 40 MG/DL
HGB BLD-MCNC: 12.4 G/DL (ref 11.1–15.9)
IMM GRANULOCYTES # BLD: 0 X10E3/UL (ref 0–0.1)
IMM GRANULOCYTES NFR BLD: 0 %
LDLC SERPL CALC-MCNC: 106 MG/DL (ref 0–99)
LDLC/HDLC SERPL: 2.7 RATIO (ref 0–3.2)
LYMPHOCYTES # BLD AUTO: 3 X10E3/UL (ref 0.7–3.1)
LYMPHOCYTES NFR BLD AUTO: 30 %
MCH RBC QN AUTO: 29.4 PG (ref 26.6–33)
MCHC RBC AUTO-ENTMCNC: 32.5 G/DL (ref 31.5–35.7)
MCV RBC AUTO: 90 FL (ref 79–97)
MICRODELETION SYND BLD/T FISH: NORMAL
MONOCYTES # BLD AUTO: 0.8 X10E3/UL (ref 0.1–0.9)
MONOCYTES NFR BLD AUTO: 8 %
NEUTROPHILS # BLD AUTO: 5.8 X10E3/UL (ref 1.4–7)
NEUTROPHILS NFR BLD AUTO: 60 %
PLATELET # BLD AUTO: 274 X10E3/UL (ref 150–450)
POTASSIUM SERPL-SCNC: 5.2 MMOL/L (ref 3.5–5.2)
PROT SERPL-MCNC: 6.8 G/DL (ref 6–8.5)
RBC # BLD AUTO: 4.22 X10E6/UL (ref 3.77–5.28)
SL AMB VLDL CHOLESTEROL CALC: 23 MG/DL (ref 5–40)
SODIUM SERPL-SCNC: 138 MMOL/L (ref 134–144)
TRIGL SERPL-MCNC: 131 MG/DL (ref 0–149)
TSH SERPL DL<=0.005 MIU/L-ACNC: 2.02 UIU/ML (ref 0.45–4.5)
WBC # BLD AUTO: 9.8 X10E3/UL (ref 3.4–10.8)

## 2025-07-07 NOTE — LETTER
2021    Aris Mendez MD  [address] [address]    Patient: Arnav Jorgensen   YOB: 1985   Date of Visit: 2021     Encounter Diagnosis     ICD-10-CM    1  Fibromyalgia  M79 7 Ambulatory referral to Physical Therapy       Dear Dr Romaine Barrios:    Thank you for your recent referral of Arnav Jorgensen  Please review the attached evaluation summary from Maribel's recent visit  Please verify that you agree with the plan of care by signing the attached order  If you have any questions or concerns, please do not hesitate to call  I sincerely appreciate the opportunity to share in the care of one of your patients and hope to have another opportunity to work with you in the near future  Sincerely,    [facility], PT      Referring Provider:      I certify that I have read the below Plan of Care and certify the need for these services furnished under this plan of treatment while under my care  Aris Mendez MD  [address] [address]  [place]          PT Evaluation     Today's date: 2021  Patient name: Arnav Jorgensen  : 1985  MRN: [id number]  Referring provider: Deidre Rodrigues MD  Dx:   Encounter Diagnosis     ICD-10-CM    1  Fibromyalgia  M79 7 Ambulatory referral to Physical Therapy                  Assessment  Assessment details: Pt is 36 y/o female recently diagnosed with fibromyalgia presenting for skilled PT evaluation  Symptoms have been ongoing since March, with chief complaint being neck pain/headaches and difficulty breathing  She has been cleared by cardiology work-up and PCP does not think pt will benefit from pulmonology consult, breathing difficulty likely due to anxiety  She does have hx of anxiety and depression, but denies ever having breathing difficulties in the past  Pain is localized to mainly neck and upper quarter  She presents with normal cervical and UE ROM   UE and LE strength grossly 5/5  She does present with hypertonicity t/o cervical musculature likely contributing to neck pain and headache  Also rounded shoulders and forward head posture  Neck disability index score is 38%  She also presents with increased use of accessory muscles of breathing rather than diaphragmatic breathing  Her 6 Minute walk test score is below age norms indicating decreased endurance overall, no issues with breathing or SpO2 or HR afterwards  Overall pt mostly sedentary and not working due to symptoms, may have components of fear avoidance behavior as well  No history of exercise - 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  Reviewed POC w/ pt with focus on trigger point release to reduce neck muscle tension, postural exercises and education, and diaphragmatic breathing training  Discussed possible benefits of meditation and yoga  Pt in agreement with overall POC  She will benefit from skilled PT to address above impairments and return to highest level of function possible  Impairments: abnormal muscle tone, lacks appropriate home exercise program, pain with function, poor posture  and poor body mechanics  Understanding of Dx/Px/POC: good   Prognosis: good    Goals  Short-term goals (4 weeks)  1  Patient will be independent in basic HEP  2  Patient will report 25% reduction in neck and headache pain levels  3  Patient will demonstrate appropriate diaphragmatic breathing with use of accessory muscles <25% of the time  Long-term goals (8-12 weeks)  1  Patient will score <10% on Neck Disability Index to resume household chores/daily routines  2  Patient will demonstrate appropriate diaphragmatic breathing with no observable over-use of accessory muscles  3  Patient will ambulate > 1,800 ft on 6 Minute Walk Test to meet age norm values and demonstrate increased cardiovascular endurance    4  Patient will be independent in exercise program of her choice (low impact programs ex: yoga) post discharge from PT   5  Patient will demonstrate normal muscle tone t/o cervical musculature  Plan  Patient would benefit from: skilled physical therapy  Planned modality interventions: biofeedback and TENS  Planned therapy interventions: balance, neuromuscular re-education, manual therapy, breathing training, body mechanics training, patient education, postural training, community reintegration, graded exercise, graded motor, home exercise program, graded activity, gait training, functional ROM exercises, flexibility, transfer training, therapeutic training, therapeutic exercise, therapeutic activities, stretching and strengthening  Frequency: 2x week  Duration in weeks: 12  Plan of Care beginning date: 2021  Plan of Care expiration date: 2021  Treatment plan discussed with: patient        Subjective Evaluation    History of Present Illness  Mechanism of injury: Pt reports having issues since 2020, but officially diagnosed with fibromyalgia on Dec 29th  Initial symptoms were panic attack, sore muscles, feeling restless  Warm baths seemed to help  Current symptoms include tension in shoulders, neck, upper back area, and her chest  She does get headaches from this  Biggest problem is difficulty breathing properly due to "tension" in her chest - cleared by cardiology work-up, was given inhaler but not helpful  She also gets periods of lightheadedness where she feels like she is going to pass out, so she lays down and puts a cold towel on her head  She was prescribed cymbalta by Dr Ran Vickers and has been taking for past 2 weeks  She has appt with psychiatrist in February  Quality of life: good    Pain  Current pain ratin  At best pain ratin  At worst pain rating: 10  Location: upper quarter   Quality: tension      Social Support  Steps to enter house: yes  Stairs in house: yes   Lives in: multiple-level home  Lives with: young children and spouse    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, 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 )  Patient Goals  Patient goals for therapy: improved balance, independence with ADLs/IADLs, return to sport/leisure activities, increased strength, decreased pain and increased motion  Patient goal: relaxation in upper half of body, reduce tension, figure out the breathing issue         Objective     Palpation   Left   Hypertonic in the cervical paraspinals, levator scapulae, rhomboids, scalenes, sternocleidomastoid, suboccipitals and upper trapezius  Right   Hypertonic in the cervical paraspinals, levator scapulae, rhomboids, scalenes, sternocleidomastoid, suboccipitals and upper trapezius       Active Range of Motion   Cervical/Thoracic Spine       Cervical    Flexion:  WFL  Extension:  WFL  Left lateral flexion:  WFL  Right lateral flexion:  WFL  Left rotation:  [facility]  Right rotation:  [facility]    Strength/Myotome Testing     Additional Strength Details  UE strength grossly 5/5 throughout   LE strength grossly 5/5 throughout    Functional Assessment        Comments  6 Minute Walk Test: 1,430 ft  SpO2 99%, HR 75 bpm   No increase in HA or symptoms       Flowsheet Rows      Most Recent Value   PT/OT G-Codes   Current Score  64   Projected Score  70             Precautions:   Past Medical History:   Diagnosis Date    Anxiety 6/5/2018    Asthma     Psychiatric disorder     Varicella            Manuals                                                                 Neuro Re-Ed                                                                                                        Ther Ex                                                                                                                     Ther Activity                                       Gait Training                                       Modalities Yes

## 2025-07-14 ENCOUNTER — OFFICE VISIT (OUTPATIENT)
Age: 40
End: 2025-07-14
Payer: COMMERCIAL

## 2025-07-14 VITALS
DIASTOLIC BLOOD PRESSURE: 83 MMHG | WEIGHT: 129 LBS | BODY MASS INDEX: 23.74 KG/M2 | HEIGHT: 62 IN | SYSTOLIC BLOOD PRESSURE: 112 MMHG | HEART RATE: 58 BPM | OXYGEN SATURATION: 98 %

## 2025-07-14 DIAGNOSIS — Z86.0101 HISTORY OF ADENOMATOUS POLYP OF COLON: Primary | ICD-10-CM

## 2025-07-14 DIAGNOSIS — R10.13 DYSPEPSIA: ICD-10-CM

## 2025-07-14 PROCEDURE — 99214 OFFICE O/P EST MOD 30 MIN: CPT | Performed by: PHYSICIAN ASSISTANT

## 2025-07-14 RX ORDER — PANTOPRAZOLE SODIUM 40 MG/1
40 TABLET, DELAYED RELEASE ORAL 2 TIMES DAILY
Qty: 180 TABLET | Refills: 3 | Status: SHIPPED | OUTPATIENT
Start: 2025-07-14

## 2025-07-14 RX ORDER — BISACODYL 5 MG/1
10 TABLET, DELAYED RELEASE ORAL ONCE
Qty: 2 TABLET | Refills: 0 | Status: SHIPPED | OUTPATIENT
Start: 2025-07-14 | End: 2025-07-14

## 2025-07-14 NOTE — PATIENT INSTRUCTIONS
Scheduled date of colonoscopy (as of today):09/12/25  Physician performing colonoscopy:Dr. Foley  Location of colonoscopy:[facility]  Bowel prep reviewed with patient:Kinza  Instructions reviewed with patient by:jesse  Clearances:  n/a

## 2025-07-14 NOTE — PROGRESS NOTES
Name: Maribel Duncan      : 1985      MRN: [id number]  Encounter Provider: Marzena Prather PA-C  Encounter Date: 2025   Encounter department: [facility] [place]  :  Assessment & Plan  History of adenomatous polyp of colon  Patient had 1 cm polyp from sigmoid colon removed in piecemeal fashion determined to be tubular adenoma on biopsy, over 3 years ago at this point.  Rule out recurrence or any new malignancy    - Plan for colonoscopy    - Procedure was explained in detail to patient at this time including associated risks and benefits, risks including but not limited to infection, perforation and bleeding    - Prescription and instructions provided for colonoscopy prep  Orders:    bisacodyl (DULCOLAX) 5 mg EC tablet; Take 2 tablets (10 mg total) by mouth once for 1 dose    polyethylene glycol (GOLYTELY) 4000 mL solution; Take 4,000 mL by mouth once for 1 dose    Colonoscopy; Future    Dyspepsia  Symptomatology appears possibly related to GERD, there is no evidence of Ramsey's or significant hiatal hernia on recent EGD.  She does have gastroparesis which is likely factoring into some degree    - Reiterated importance of gastroparesis diet, keeping the small frequent meals and being mindful of foods high in fat and roughage    - Will also increase pantoprazole to twice daily, advised patient to give it 3 to 4 weeks of consistent twice daily use and if she has no improvement by such time she should let us know in which case we can consider use of a different PPI, could also consider pH testing  Orders:    pantoprazole (PROTONIX) 40 mg tablet; Take 1 tablet (40 mg total) by mouth in the morning and 1 tablet (40 mg total) before bedtime.        History of Present Illness     Maribel Duncan is a 40 y.o. female with history of anxiety/depression, gastroparesis diagnosed in 2024 (GES = 58% emptied at third hour, 77% emptied at fourth hour) who presents for follow-up,  "was last seen in our office about 4 months ago with Dr. Foley in March after recent flareup of symptoms, noting some improvement after changing timing of her use of pantoprazole to bedtime.    Last EGD in December 2024 was grossly normal; no intestinal metaplasia or H. pylori infection on gastric biopsies.      She also had a colonoscopy in June 2022 seeing the piecemeal removal of a large 1 cm pedunculated polyp in the distal sigmoid colon; biopsy showed tubular adenoma and she was recommended for colonoscopy in 3 years, for which she is currently due.    At this time, patient says that her epigastric discomfort issues have gotten better but generally for the last few months she has been experiencing more discomfort in the chest and throat, describing a pressure-like sensation, usually is worse at night.  She still taking pantoprazole once daily in the evening time.  Denies any regurgitation of food or any pain with swallowing, though does endorse globus sensation, swallowing sometimes feels \"like swallowing a slug.\".  No vomiting.  Bowel habits are generally regular though stools are often small.  She has been taking Protonix for about 2 to 3 years now she tells me.    HPI    Review of Systems   Constitutional:  Negative for activity change, appetite change, chills, fatigue, fever and unexpected weight change.   HENT:  Negative for congestion, nosebleeds, rhinorrhea, sore throat and trouble swallowing.    Eyes:  Negative for pain, itching and visual disturbance.   Respiratory:  Negative for cough, shortness of breath and wheezing.    Cardiovascular:  Negative for chest pain, palpitations and leg swelling.   Gastrointestinal: Negative.    Endocrine: Negative for cold intolerance, heat intolerance and polyuria.   Genitourinary:  Negative for difficulty urinating, dysuria, flank pain and hematuria.   Musculoskeletal:  Negative for arthralgias, back pain, myalgias and neck pain.   Skin:  Negative for color change, " pallor and rash.   Neurological:  Negative for dizziness, speech difficulty, weakness, numbness and headaches.   Hematological:  Does not bruise/bleed easily.   Psychiatric/Behavioral:  Negative for dysphoric mood and sleep disturbance. The patient is not nervous/anxious.    All other systems reviewed and are negative.   A complete review of systems is negative other than that noted above in the HPI.      Current Medications[1]  Objective   There were no vitals taken for this visit.    Physical Exam  Constitutional:       General: She is not in acute distress.     Appearance: She is well-developed. She is not diaphoretic.   HENT:      Head: Normocephalic and atraumatic.     Eyes:      Conjunctiva/sclera: Conjunctivae normal.      Pupils: Pupils are equal, round, and reactive to light.       Cardiovascular:      Rate and Rhythm: Normal rate and regular rhythm.      Heart sounds: Normal heart sounds. No murmur heard.     No friction rub. No gallop.   Pulmonary:      Effort: Pulmonary effort is normal. No respiratory distress.      Breath sounds: Normal breath sounds. No stridor. No wheezing or rales.   Abdominal:      General: Bowel sounds are normal. There is no distension.      Palpations: Abdomen is soft. There is no mass.      Tenderness: There is no abdominal tenderness. There is no guarding or rebound.     Musculoskeletal:         General: No tenderness. Normal range of motion.      Cervical back: Normal range of motion and neck supple.     Skin:     General: Skin is warm and dry.      Coloration: Skin is not pale.      Findings: No erythema or rash.     Neurological:      Mental Status: She is alert and oriented to person, place, and time.     Psychiatric:         Behavior: Behavior normal.          Lab Results: I personally reviewed relevant lab results.       Results for orders placed during the hospital encounter of 12/04/24    EGD    Impression  The esophagus appeared normal.  The stomach appeared normal.  Performed random biopsy.  The duodenum appeared normal.      RECOMMENDATION:  Await pathology results  Schedule gastric emptying study            Young Foley MD             [1]   Current Outpatient Medications   Medication Sig Dispense Refill    famotidine (PEPCID) 20 mg tablet Take 20 mg by mouth daily at bedtime as needed for heartburn      ondansetron (ZOFRAN-ODT) 4 mg disintegrating tablet Take 1 tablet (4 mg total) by mouth every 6 (six) hours as needed for nausea or vomiting 20 tablet 1    pantoprazole (PROTONIX) 40 mg tablet Take 1 tablet (40 mg total) by mouth daily 30 tablet 11    pregabalin (LYRICA) 100 mg capsule Take 1 capsule by mouth twice daily 60 capsule 0    Simethicone (GAS-X PO) Take by mouth if needed       No current facility-administered medications for this visit.

## 2025-07-14 NOTE — ASSESSMENT & PLAN NOTE
Symptomatology appears possibly related to GERD, there is no evidence of Ramsey's or significant hiatal hernia on recent EGD.  She does have gastroparesis which is likely factoring into some degree    - Reiterated importance of gastroparesis diet, keeping the small frequent meals and being mindful of foods high in fat and roughage    - Will also increase pantoprazole to twice daily, advised patient to give it 3 to 4 weeks of consistent twice daily use and if she has no improvement by such time she should let us know in which case we can consider use of a different PPI, could also consider pH testing  Orders:    pantoprazole (PROTONIX) 40 mg tablet; Take 1 tablet (40 mg total) by mouth in the morning and 1 tablet (40 mg total) before bedtime.

## 2025-07-23 DIAGNOSIS — M79.18 MUSCLE PAIN, MYOFASCIAL: ICD-10-CM

## 2025-07-23 DIAGNOSIS — R07.89 DISCOMFORT IN CHEST: ICD-10-CM

## 2025-07-23 RX ORDER — PREGABALIN 100 MG/1
100 CAPSULE ORAL 2 TIMES DAILY
Qty: 60 CAPSULE | Refills: 0 | Status: SHIPPED | OUTPATIENT
Start: 2025-07-23

## 2025-08-21 DIAGNOSIS — R07.89 DISCOMFORT IN CHEST: ICD-10-CM

## 2025-08-21 DIAGNOSIS — M79.18 MUSCLE PAIN, MYOFASCIAL: ICD-10-CM

## 2025-08-22 RX ORDER — PREGABALIN 100 MG/1
100 CAPSULE ORAL 2 TIMES DAILY
Qty: 60 CAPSULE | Refills: 0 | Status: SHIPPED | OUTPATIENT
Start: 2025-08-22

## (undated) DEVICE — Device

## (undated) DEVICE — LEGGINGS: Brand: CONVERTORS

## (undated) DEVICE — PVC URETHRAL CATHETER: Brand: DOVER

## (undated) DEVICE — GLOVE INDICATOR PI UNDERGLOVE SZ 6.5 BLUE

## (undated) DEVICE — IRRIG ENDO FLO TUBING

## (undated) DEVICE — BLUNT TIP LAPAROSCOPIC SEALER/DIVIDER: Brand: LIGASURE

## (undated) DEVICE — SYRINGE 10ML LL CONTROL TOP

## (undated) DEVICE — GLOVE SRG BIOGEL 6.5

## (undated) DEVICE — IMPERVIOUS SURGICAL GOWN, LG: Brand: CONVERTORS

## (undated) DEVICE — REM POLYHESIVE ADULT PATIENT RETURN ELECTRODE: Brand: VALLEYLAB

## (undated) DEVICE — ENSEAL LAPAROSCOPIC TISSUE SEALER G2 STRAIGHT JAW FOR USE WITH G2 GENERATOR 5MM DIAMETER 35CM SHAFT LENGTH: Brand: ENSEAL

## (undated) DEVICE — PREP PAD BNS: Brand: CONVERTORS

## (undated) DEVICE — ADHESIVE SKN CLSR HISTOACRYL FLEX 0.5ML LF

## (undated) DEVICE — SUT MONOCRYL 4-0 PC-3 18 IN Y845G

## (undated) DEVICE — TROCAR: Brand: KII FIOS FIRST ENTRY

## (undated) DEVICE — DRAPE,UNDERBUTTOCKS,PCH,STERILE: Brand: MEDLINE

## (undated) DEVICE — LABEL MEDICATION STERILE 2 YELLOW LIDOCAINE 2 BLUE MARCAINE 2 ORANGE HEPARIN

## (undated) DEVICE — PACK GENERAL LF

## (undated) DEVICE — ENDOPATH XCEL UNIVERSAL TROCAR STABLILITY SLEEVES: Brand: ENDOPATH XCEL

## (undated) DEVICE — LAVH/LAPAROSCOPY DRAPE: Brand: CONVERTORS

## (undated) DEVICE — DISPOSABLE BRIEF/UNDERWEAR

## (undated) DEVICE — CHLORAPREP HI-LITE 26ML ORANGE

## (undated) DEVICE — URETERAL CATHETER 5 FR POLLACK

## (undated) DEVICE — INSUFLATION TUBING INSUFLOW (LEXION)

## (undated) DEVICE — MAXI PAD5.51 X 13.78 IN. (14.0 X 35.0 CM)HEAVYCONTOUREDUNSCENTED: Brand: CURITY

## (undated) DEVICE — LUBRICANT SURGILUBE TUBE 4 OZ  FLIP TOP